# Patient Record
Sex: FEMALE | Race: BLACK OR AFRICAN AMERICAN | HISPANIC OR LATINO | URBAN - METROPOLITAN AREA
[De-identification: names, ages, dates, MRNs, and addresses within clinical notes are randomized per-mention and may not be internally consistent; named-entity substitution may affect disease eponyms.]

---

## 2017-03-08 ENCOUNTER — OUTPATIENT (OUTPATIENT)
Dept: OUTPATIENT SERVICES | Facility: HOSPITAL | Age: 57
LOS: 1 days | Discharge: HOME | End: 2017-03-08

## 2017-06-27 DIAGNOSIS — R79.89 OTHER SPECIFIED ABNORMAL FINDINGS OF BLOOD CHEMISTRY: ICD-10-CM

## 2017-06-27 DIAGNOSIS — D50.8 OTHER IRON DEFICIENCY ANEMIAS: ICD-10-CM

## 2017-06-27 DIAGNOSIS — M06.4 INFLAMMATORY POLYARTHROPATHY: ICD-10-CM

## 2017-07-05 ENCOUNTER — OUTPATIENT (OUTPATIENT)
Dept: OUTPATIENT SERVICES | Facility: HOSPITAL | Age: 57
LOS: 1 days | Discharge: HOME | End: 2017-07-05

## 2017-07-05 DIAGNOSIS — E11.9 TYPE 2 DIABETES MELLITUS WITHOUT COMPLICATIONS: ICD-10-CM

## 2017-07-05 DIAGNOSIS — M10.9 GOUT, UNSPECIFIED: ICD-10-CM

## 2017-07-05 DIAGNOSIS — R79.89 OTHER SPECIFIED ABNORMAL FINDINGS OF BLOOD CHEMISTRY: ICD-10-CM

## 2017-07-05 DIAGNOSIS — M06.9 RHEUMATOID ARTHRITIS, UNSPECIFIED: ICD-10-CM

## 2017-07-05 DIAGNOSIS — D50.8 OTHER IRON DEFICIENCY ANEMIAS: ICD-10-CM

## 2017-07-05 DIAGNOSIS — M06.4 INFLAMMATORY POLYARTHROPATHY: ICD-10-CM

## 2017-07-05 DIAGNOSIS — R80.9 PROTEINURIA, UNSPECIFIED: ICD-10-CM

## 2017-09-19 ENCOUNTER — OUTPATIENT (OUTPATIENT)
Dept: OUTPATIENT SERVICES | Facility: HOSPITAL | Age: 57
LOS: 1 days | Discharge: HOME | End: 2017-09-19

## 2017-09-19 DIAGNOSIS — R79.89 OTHER SPECIFIED ABNORMAL FINDINGS OF BLOOD CHEMISTRY: ICD-10-CM

## 2017-09-19 DIAGNOSIS — M06.9 RHEUMATOID ARTHRITIS, UNSPECIFIED: ICD-10-CM

## 2017-09-19 DIAGNOSIS — R76.12 NONSPECIFIC REACTION TO CELL MEDIATED IMMUNITY MEASUREMENT OF GAMMA INTERFERON ANTIGEN RESPONSE WITHOUT ACTIVE TUBERCULOSIS: ICD-10-CM

## 2017-09-19 DIAGNOSIS — D50.8 OTHER IRON DEFICIENCY ANEMIAS: ICD-10-CM

## 2017-09-19 DIAGNOSIS — E55.9 VITAMIN D DEFICIENCY, UNSPECIFIED: ICD-10-CM

## 2017-09-19 PROBLEM — Z00.00 ENCOUNTER FOR PREVENTIVE HEALTH EXAMINATION: Status: ACTIVE | Noted: 2017-09-19

## 2017-12-07 ENCOUNTER — OUTPATIENT (OUTPATIENT)
Dept: OUTPATIENT SERVICES | Facility: HOSPITAL | Age: 57
LOS: 1 days | Discharge: HOME | End: 2017-12-07

## 2017-12-07 DIAGNOSIS — N95.0 POSTMENOPAUSAL BLEEDING: ICD-10-CM

## 2017-12-11 ENCOUNTER — APPOINTMENT (OUTPATIENT)
Dept: OBGYN | Facility: CLINIC | Age: 57
End: 2017-12-11

## 2017-12-11 DIAGNOSIS — Z34.00 ENCOUNTER FOR SUPERVISION OF NORMAL FIRST PREGNANCY, UNSPECIFIED TRIMESTER: ICD-10-CM

## 2018-03-05 ENCOUNTER — OUTPATIENT (OUTPATIENT)
Dept: OUTPATIENT SERVICES | Facility: HOSPITAL | Age: 58
LOS: 1 days | Discharge: HOME | End: 2018-03-05

## 2018-03-05 DIAGNOSIS — R80.9 PROTEINURIA, UNSPECIFIED: ICD-10-CM

## 2018-03-05 DIAGNOSIS — M79.1 MYALGIA: ICD-10-CM

## 2018-03-05 DIAGNOSIS — D50.8 OTHER IRON DEFICIENCY ANEMIAS: ICD-10-CM

## 2018-03-05 DIAGNOSIS — R76.12 NONSPECIFIC REACTION TO CELL MEDIATED IMMUNITY MEASUREMENT OF GAMMA INTERFERON ANTIGEN RESPONSE WITHOUT ACTIVE TUBERCULOSIS: ICD-10-CM

## 2018-03-05 DIAGNOSIS — R79.89 OTHER SPECIFIED ABNORMAL FINDINGS OF BLOOD CHEMISTRY: ICD-10-CM

## 2018-04-11 ENCOUNTER — OUTPATIENT (OUTPATIENT)
Dept: OUTPATIENT SERVICES | Facility: HOSPITAL | Age: 58
LOS: 1 days | Discharge: HOME | End: 2018-04-11

## 2018-04-11 VITALS
RESPIRATION RATE: 20 BRPM | SYSTOLIC BLOOD PRESSURE: 162 MMHG | HEART RATE: 85 BPM | OXYGEN SATURATION: 100 % | WEIGHT: 273.37 LBS | TEMPERATURE: 97 F | DIASTOLIC BLOOD PRESSURE: 80 MMHG | HEIGHT: 66 IN

## 2018-04-11 DIAGNOSIS — Z98.890 OTHER SPECIFIED POSTPROCEDURAL STATES: Chronic | ICD-10-CM

## 2018-04-11 DIAGNOSIS — N95.0 POSTMENOPAUSAL BLEEDING: ICD-10-CM

## 2018-04-11 DIAGNOSIS — Z01.818 ENCOUNTER FOR OTHER PREPROCEDURAL EXAMINATION: ICD-10-CM

## 2018-04-11 LAB
ALBUMIN SERPL ELPH-MCNC: 3.8 G/DL — SIGNIFICANT CHANGE UP (ref 3.5–5.2)
ALP SERPL-CCNC: 101 U/L — SIGNIFICANT CHANGE UP (ref 30–115)
ALT FLD-CCNC: 17 U/L — SIGNIFICANT CHANGE UP (ref 0–41)
ANION GAP SERPL CALC-SCNC: 11 MMOL/L — SIGNIFICANT CHANGE UP (ref 7–14)
APPEARANCE UR: (no result)
APTT BLD: 34.6 SEC — SIGNIFICANT CHANGE UP (ref 27–39.2)
AST SERPL-CCNC: 20 U/L — SIGNIFICANT CHANGE UP (ref 0–41)
BACTERIA # UR AUTO: (no result) /HPF
BASOPHILS # BLD AUTO: 0.02 K/UL — SIGNIFICANT CHANGE UP (ref 0–0.2)
BASOPHILS NFR BLD AUTO: 0.3 % — SIGNIFICANT CHANGE UP (ref 0–1)
BILIRUB SERPL-MCNC: 0.2 MG/DL — SIGNIFICANT CHANGE UP (ref 0.2–1.2)
BILIRUB UR-MCNC: (no result)
BUN SERPL-MCNC: 13 MG/DL — SIGNIFICANT CHANGE UP (ref 10–20)
CALCIUM SERPL-MCNC: 9.4 MG/DL — SIGNIFICANT CHANGE UP (ref 8.5–10.1)
CHLORIDE SERPL-SCNC: 93 MMOL/L — LOW (ref 98–110)
CO2 SERPL-SCNC: 29 MMOL/L — SIGNIFICANT CHANGE UP (ref 17–32)
COLOR SPEC: SIGNIFICANT CHANGE UP
CREAT SERPL-MCNC: 1.1 MG/DL — SIGNIFICANT CHANGE UP (ref 0.7–1.5)
DIFF PNL FLD: NEGATIVE — SIGNIFICANT CHANGE UP
EOSINOPHIL # BLD AUTO: 0.05 K/UL — SIGNIFICANT CHANGE UP (ref 0–0.7)
EOSINOPHIL NFR BLD AUTO: 0.8 % — SIGNIFICANT CHANGE UP (ref 0–8)
EPI CELLS # UR: (no result) /HPF
GLUCOSE SERPL-MCNC: 69 MG/DL — LOW (ref 70–99)
GLUCOSE UR QL: NEGATIVE — SIGNIFICANT CHANGE UP
HCT VFR BLD CALC: 34.9 % — LOW (ref 37–47)
HGB BLD-MCNC: 11.7 G/DL — LOW (ref 12–16)
IMM GRANULOCYTES NFR BLD AUTO: 0.2 % — SIGNIFICANT CHANGE UP (ref 0.1–0.3)
INR BLD: 1 RATIO — SIGNIFICANT CHANGE UP (ref 0.65–1.3)
KETONES UR-MCNC: (no result)
LEUKOCYTE ESTERASE UR-ACNC: (no result)
LYMPHOCYTES # BLD AUTO: 2.21 K/UL — SIGNIFICANT CHANGE UP (ref 1.2–3.4)
LYMPHOCYTES # BLD AUTO: 33.5 % — SIGNIFICANT CHANGE UP (ref 20.5–51.1)
MCHC RBC-ENTMCNC: 31.1 PG — HIGH (ref 27–31)
MCHC RBC-ENTMCNC: 33.5 G/DL — SIGNIFICANT CHANGE UP (ref 32–37)
MCV RBC AUTO: 92.8 FL — SIGNIFICANT CHANGE UP (ref 81–99)
MONOCYTES # BLD AUTO: 0.73 K/UL — HIGH (ref 0.1–0.6)
MONOCYTES NFR BLD AUTO: 11.1 % — HIGH (ref 1.7–9.3)
NEUTROPHILS # BLD AUTO: 3.57 K/UL — SIGNIFICANT CHANGE UP (ref 1.4–6.5)
NEUTROPHILS NFR BLD AUTO: 54.1 % — SIGNIFICANT CHANGE UP (ref 42.2–75.2)
NITRITE UR-MCNC: NEGATIVE — SIGNIFICANT CHANGE UP
NRBC # BLD: 0 /100 WBCS — SIGNIFICANT CHANGE UP (ref 0–0)
PH UR: 6 — SIGNIFICANT CHANGE UP (ref 5–8)
PLATELET # BLD AUTO: 223 K/UL — SIGNIFICANT CHANGE UP (ref 130–400)
POTASSIUM SERPL-MCNC: 4.6 MMOL/L — SIGNIFICANT CHANGE UP (ref 3.5–5)
POTASSIUM SERPL-SCNC: 4.6 MMOL/L — SIGNIFICANT CHANGE UP (ref 3.5–5)
PROT SERPL-MCNC: 6.9 G/DL — SIGNIFICANT CHANGE UP (ref 6–8)
PROT UR-MCNC: 30
PROTHROM AB SERPL-ACNC: 10.8 SEC — SIGNIFICANT CHANGE UP (ref 9.95–12.87)
RBC # BLD: 3.76 M/UL — LOW (ref 4.2–5.4)
RBC # FLD: 13.6 % — SIGNIFICANT CHANGE UP (ref 11.5–14.5)
SODIUM SERPL-SCNC: 133 MMOL/L — LOW (ref 135–146)
SP GR SPEC: >=1.03 — SIGNIFICANT CHANGE UP (ref 1.01–1.03)
UROBILINOGEN FLD QL: 1 (ref 0.2–0.2)
WBC # BLD: 6.59 K/UL — SIGNIFICANT CHANGE UP (ref 4.8–10.8)
WBC # FLD AUTO: 6.59 K/UL — SIGNIFICANT CHANGE UP (ref 4.8–10.8)
WBC UR QL: (no result) /HPF

## 2018-04-11 NOTE — H&P PST ADULT - REASON FOR ADMISSION
Pt denies cp palp uri cough dysuria or sob. ET 1 FOS denies SOB . PT denies any open wounds, drainage or rashes.  scheduled for D&C Pt denies cp palp uri cough dysuria . ET 1 FOS with  SOB . PT denies any open wounds, drainage or rashes.  scheduled for D&C.   pt has hx of gastric bypass 2003.   initially lost 120 lbs but regained since per pt states .denies post op complications.  pt advised follow up dr tran for methotrexate advise.

## 2018-04-11 NOTE — H&P PST ADULT - NSANTHOSAYNRD_GEN_A_CORE
No. TING screening performed.  STOP BANG Legend: 0-2 = LOW Risk; 3-4 = INTERMEDIATE Risk; 5-8 = HIGH Risk/see sheet

## 2018-04-11 NOTE — H&P PST ADULT - PSH
History of cholecystectomy    History of surgery  gastric bypass 2003  S/P left knee arthroscopy  bilateral knee arthroscopy 2015

## 2018-04-11 NOTE — H&P PST ADULT - PMH
Fibromyalgia    OA (osteoarthritis)    Rheumatoid arthritis Anxiety    Depression    Fibromyalgia    OA (osteoarthritis)    Obesity  bmi 46  Rheumatoid arthritis

## 2018-04-12 LAB
CULTURE RESULTS: SIGNIFICANT CHANGE UP
SPECIMEN SOURCE: SIGNIFICANT CHANGE UP

## 2018-04-18 ENCOUNTER — OUTPATIENT (OUTPATIENT)
Dept: OUTPATIENT SERVICES | Facility: HOSPITAL | Age: 58
LOS: 1 days | Discharge: HOME | End: 2018-04-18

## 2018-04-18 DIAGNOSIS — Z98.890 OTHER SPECIFIED POSTPROCEDURAL STATES: Chronic | ICD-10-CM

## 2018-04-18 DIAGNOSIS — Z01.818 ENCOUNTER FOR OTHER PREPROCEDURAL EXAMINATION: ICD-10-CM

## 2018-04-18 LAB
ANION GAP SERPL CALC-SCNC: 10 MMOL/L — SIGNIFICANT CHANGE UP (ref 7–14)
BUN SERPL-MCNC: 27 MG/DL — HIGH (ref 10–20)
CALCIUM SERPL-MCNC: 8.8 MG/DL — SIGNIFICANT CHANGE UP (ref 8.5–10.1)
CHLORIDE SERPL-SCNC: 102 MMOL/L — SIGNIFICANT CHANGE UP (ref 98–110)
CO2 SERPL-SCNC: 26 MMOL/L — SIGNIFICANT CHANGE UP (ref 17–32)
CREAT SERPL-MCNC: 1 MG/DL — SIGNIFICANT CHANGE UP (ref 0.7–1.5)
GLUCOSE SERPL-MCNC: 89 MG/DL — SIGNIFICANT CHANGE UP (ref 70–99)
POTASSIUM SERPL-MCNC: 5 MMOL/L — SIGNIFICANT CHANGE UP (ref 3.5–5)
POTASSIUM SERPL-SCNC: 5 MMOL/L — SIGNIFICANT CHANGE UP (ref 3.5–5)
SODIUM SERPL-SCNC: 138 MMOL/L — SIGNIFICANT CHANGE UP (ref 135–146)

## 2018-04-19 DIAGNOSIS — N95.0 POSTMENOPAUSAL BLEEDING: ICD-10-CM

## 2018-04-19 LAB
CULTURE RESULTS: SIGNIFICANT CHANGE UP
SPECIMEN SOURCE: SIGNIFICANT CHANGE UP

## 2018-04-24 NOTE — ASU PATIENT PROFILE, ADULT - PMH
Anxiety    Depression    Fibromyalgia    OA (osteoarthritis)    Obesity  bmi 46  Rheumatoid arthritis

## 2018-04-25 ENCOUNTER — RESULT REVIEW (OUTPATIENT)
Age: 58
End: 2018-04-25

## 2018-04-25 ENCOUNTER — OUTPATIENT (OUTPATIENT)
Dept: OUTPATIENT SERVICES | Facility: HOSPITAL | Age: 58
LOS: 1 days | Discharge: HOME | End: 2018-04-25

## 2018-04-25 VITALS
OXYGEN SATURATION: 100 % | TEMPERATURE: 98 F | WEIGHT: 273.37 LBS | SYSTOLIC BLOOD PRESSURE: 126 MMHG | RESPIRATION RATE: 18 BRPM | HEART RATE: 73 BPM | HEIGHT: 66 IN | DIASTOLIC BLOOD PRESSURE: 76 MMHG

## 2018-04-25 VITALS
DIASTOLIC BLOOD PRESSURE: 81 MMHG | SYSTOLIC BLOOD PRESSURE: 140 MMHG | RESPIRATION RATE: 18 BRPM | HEART RATE: 75 BPM | OXYGEN SATURATION: 100 %

## 2018-04-25 DIAGNOSIS — Z98.890 OTHER SPECIFIED POSTPROCEDURAL STATES: Chronic | ICD-10-CM

## 2018-04-25 RX ORDER — MORPHINE SULFATE 50 MG/1
2 CAPSULE, EXTENDED RELEASE ORAL
Qty: 0 | Refills: 0 | Status: DISCONTINUED | OUTPATIENT
Start: 2018-04-25 | End: 2018-04-25

## 2018-04-25 RX ORDER — ONDANSETRON 8 MG/1
4 TABLET, FILM COATED ORAL ONCE
Qty: 0 | Refills: 0 | Status: DISCONTINUED | OUTPATIENT
Start: 2018-04-25 | End: 2018-05-10

## 2018-04-25 RX ORDER — SODIUM CHLORIDE 9 MG/ML
1000 INJECTION, SOLUTION INTRAVENOUS
Qty: 0 | Refills: 0 | Status: DISCONTINUED | OUTPATIENT
Start: 2018-04-25 | End: 2018-05-10

## 2018-04-25 RX ORDER — KETOROLAC TROMETHAMINE 30 MG/ML
15 SYRINGE (ML) INJECTION ONCE
Qty: 0 | Refills: 0 | Status: DISCONTINUED | OUTPATIENT
Start: 2018-04-25 | End: 2018-04-25

## 2018-04-25 RX ADMIN — SODIUM CHLORIDE 75 MILLILITER(S): 9 INJECTION, SOLUTION INTRAVENOUS at 15:21

## 2018-04-25 NOTE — ASU DISCHARGE PLAN (ADULT/PEDIATRIC). - ASU FOLLOWUP
911 or go to the nearest Emergency Room [facility] ER:  [place] for Ambulatory Surgery...

## 2018-04-25 NOTE — PRE-ANESTHESIA EVALUATION ADULT - NSANTHOSAYNRD_GEN_A_CORE
see sheet/No. TING screening performed.  STOP BANG Legend: 0-2 = LOW Risk; 3-4 = INTERMEDIATE Risk; 5-8 = HIGH Risk

## 2018-04-25 NOTE — BRIEF OPERATIVE NOTE - OPERATION/FINDINGS
Obese patient, uterine fundus not palpable, normal vagina, stenotic cervix, polypoid appearing tissue in cervix

## 2018-04-25 NOTE — BRIEF OPERATIVE NOTE - PROCEDURE
<<-----Click on this checkbox to enter Procedure Hysteroscopy with dilation and curettage of uterus using MyoSure device  04/25/2018    Active  APULLANO

## 2018-04-25 NOTE — BRIEF OPERATIVE NOTE - COMMENTS
Distension medium: normal saline  Fluid deficit: 1000cc, approximately 700cc on floor and bed  Estimated actual fluid deficit: 300cc

## 2018-04-25 NOTE — BRIEF OPERATIVE NOTE - POST-OP DX
Cervical stenosis (uterine cervix)  04/25/2018    Active  Tu Zamudio  Postmenopausal bleeding  04/25/2018    Active  Tu Zamudio

## 2018-04-30 LAB — SURGICAL PATHOLOGY STUDY: SIGNIFICANT CHANGE UP

## 2018-05-02 DIAGNOSIS — N84.1 POLYP OF CERVIX UTERI: ICD-10-CM

## 2018-05-02 DIAGNOSIS — Z98.84 BARIATRIC SURGERY STATUS: ICD-10-CM

## 2018-05-02 DIAGNOSIS — F32.9 MAJOR DEPRESSIVE DISORDER, SINGLE EPISODE, UNSPECIFIED: ICD-10-CM

## 2018-05-02 DIAGNOSIS — N95.0 POSTMENOPAUSAL BLEEDING: ICD-10-CM

## 2018-05-02 DIAGNOSIS — N88.2 STRICTURE AND STENOSIS OF CERVIX UTERI: ICD-10-CM

## 2018-05-02 DIAGNOSIS — Z90.49 ACQUIRED ABSENCE OF OTHER SPECIFIED PARTS OF DIGESTIVE TRACT: ICD-10-CM

## 2018-05-02 DIAGNOSIS — M06.9 RHEUMATOID ARTHRITIS, UNSPECIFIED: ICD-10-CM

## 2018-05-02 DIAGNOSIS — F41.9 ANXIETY DISORDER, UNSPECIFIED: ICD-10-CM

## 2018-05-02 DIAGNOSIS — M19.90 UNSPECIFIED OSTEOARTHRITIS, UNSPECIFIED SITE: ICD-10-CM

## 2018-05-02 DIAGNOSIS — E66.9 OBESITY, UNSPECIFIED: ICD-10-CM

## 2018-05-02 DIAGNOSIS — M79.7 FIBROMYALGIA: ICD-10-CM

## 2018-06-01 ENCOUNTER — OUTPATIENT (OUTPATIENT)
Dept: OUTPATIENT SERVICES | Facility: HOSPITAL | Age: 58
LOS: 1 days | Discharge: HOME | End: 2018-06-01

## 2018-06-01 DIAGNOSIS — E78.2 MIXED HYPERLIPIDEMIA: ICD-10-CM

## 2018-06-01 DIAGNOSIS — Z98.890 OTHER SPECIFIED POSTPROCEDURAL STATES: Chronic | ICD-10-CM

## 2018-06-01 DIAGNOSIS — M06.4 INFLAMMATORY POLYARTHROPATHY: ICD-10-CM

## 2018-06-01 DIAGNOSIS — E78.00 PURE HYPERCHOLESTEROLEMIA, UNSPECIFIED: ICD-10-CM

## 2018-06-01 DIAGNOSIS — D50.9 IRON DEFICIENCY ANEMIA, UNSPECIFIED: ICD-10-CM

## 2018-06-04 ENCOUNTER — OUTPATIENT (OUTPATIENT)
Dept: OUTPATIENT SERVICES | Facility: HOSPITAL | Age: 58
LOS: 1 days | Discharge: HOME | End: 2018-06-04

## 2018-06-04 VITALS
TEMPERATURE: 98 F | WEIGHT: 250 LBS | RESPIRATION RATE: 16 BRPM | HEART RATE: 76 BPM | DIASTOLIC BLOOD PRESSURE: 86 MMHG | SYSTOLIC BLOOD PRESSURE: 130 MMHG | OXYGEN SATURATION: 98 %

## 2018-06-04 DIAGNOSIS — K63.5 POLYP OF COLON: Chronic | ICD-10-CM

## 2018-06-04 DIAGNOSIS — Z01.818 ENCOUNTER FOR OTHER PREPROCEDURAL EXAMINATION: ICD-10-CM

## 2018-06-04 DIAGNOSIS — Z98.890 OTHER SPECIFIED POSTPROCEDURAL STATES: Chronic | ICD-10-CM

## 2018-06-04 DIAGNOSIS — M76.62 ACHILLES TENDINITIS, LEFT LEG: ICD-10-CM

## 2018-06-04 RX ORDER — CERTOLIZUMAB PEGOL 400 MG
0 KIT SUBCUTANEOUS
Qty: 0 | Refills: 0 | COMMUNITY

## 2018-06-04 RX ORDER — INDOMETHACIN 50 MG
1 CAPSULE ORAL
Qty: 0 | Refills: 0 | COMMUNITY

## 2018-06-04 RX ORDER — METHOTREXATE 2.5 MG/1
1 TABLET ORAL
Qty: 0 | Refills: 0 | COMMUNITY

## 2018-06-04 RX ORDER — DULOXETINE HYDROCHLORIDE 30 MG/1
0 CAPSULE, DELAYED RELEASE ORAL
Qty: 0 | Refills: 0 | COMMUNITY

## 2018-06-04 NOTE — H&P PST ADULT - HISTORY OF PRESENT ILLNESS
57YR OLF FEMALE STATES WAS WALKING THE DOG ABOUT FEW WEEKS AGO AND "HURT MY MY LEFT ACHILLES TENDON". Denies c/o cp ,palp, sob, uri ,fever ,rash ot uti symptoms. Exercise jayna -uses cane, walks slowly 1-2 flat blocks ltd by pain lt ankle.

## 2018-06-04 NOTE — H&P PST ADULT - PSH
Colon polyp  colonoscopy q 6m  History of cholecystectomy    History of dilatation and curettage  April 2018  History of surgery  gastric bypass 2003  S/P left knee arthroscopy  bilateral knee arthroscopy 2015

## 2018-06-07 ENCOUNTER — OUTPATIENT (OUTPATIENT)
Dept: OUTPATIENT SERVICES | Facility: HOSPITAL | Age: 58
LOS: 1 days | Discharge: HOME | End: 2018-06-07
Payer: MEDICARE

## 2018-06-07 VITALS
DIASTOLIC BLOOD PRESSURE: 78 MMHG | TEMPERATURE: 98 F | HEART RATE: 76 BPM | HEIGHT: 66 IN | OXYGEN SATURATION: 100 % | WEIGHT: 273.37 LBS | SYSTOLIC BLOOD PRESSURE: 136 MMHG | RESPIRATION RATE: 18 BRPM

## 2018-06-07 VITALS
SYSTOLIC BLOOD PRESSURE: 131 MMHG | RESPIRATION RATE: 22 BRPM | OXYGEN SATURATION: 99 % | DIASTOLIC BLOOD PRESSURE: 79 MMHG | HEART RATE: 77 BPM

## 2018-06-07 DIAGNOSIS — Z98.890 OTHER SPECIFIED POSTPROCEDURAL STATES: Chronic | ICD-10-CM

## 2018-06-07 DIAGNOSIS — K63.5 POLYP OF COLON: Chronic | ICD-10-CM

## 2018-06-07 PROCEDURE — 99213 OFFICE O/P EST LOW 20 MIN: CPT

## 2018-06-07 RX ORDER — ONDANSETRON 8 MG/1
4 TABLET, FILM COATED ORAL ONCE
Qty: 0 | Refills: 0 | Status: COMPLETED | OUTPATIENT
Start: 2018-06-07 | End: 2018-06-07

## 2018-06-07 RX ORDER — IBUPROFEN 200 MG
1 TABLET ORAL
Qty: 90 | Refills: 0 | OUTPATIENT
Start: 2018-06-07 | End: 2018-07-06

## 2018-06-07 RX ORDER — SODIUM CHLORIDE 9 MG/ML
1000 INJECTION, SOLUTION INTRAVENOUS
Qty: 0 | Refills: 0 | Status: DISCONTINUED | OUTPATIENT
Start: 2018-06-07 | End: 2018-06-22

## 2018-06-07 RX ORDER — MORPHINE SULFATE 50 MG/1
2 CAPSULE, EXTENDED RELEASE ORAL
Qty: 0 | Refills: 0 | Status: DISCONTINUED | OUTPATIENT
Start: 2018-06-07 | End: 2018-06-07

## 2018-06-07 RX ORDER — ASPIRIN/CALCIUM CARB/MAGNESIUM 324 MG
1 TABLET ORAL
Qty: 60 | Refills: 0 | OUTPATIENT
Start: 2018-06-07 | End: 2018-07-06

## 2018-06-07 RX ORDER — OXYCODONE AND ACETAMINOPHEN 5; 325 MG/1; MG/1
2 TABLET ORAL EVERY 6 HOURS
Qty: 0 | Refills: 0 | Status: DISCONTINUED | OUTPATIENT
Start: 2018-06-07 | End: 2018-06-07

## 2018-06-07 RX ORDER — MEPERIDINE HYDROCHLORIDE 50 MG/ML
12.5 INJECTION INTRAMUSCULAR; INTRAVENOUS; SUBCUTANEOUS ONCE
Qty: 0 | Refills: 0 | Status: DISCONTINUED | OUTPATIENT
Start: 2018-06-07 | End: 2018-06-07

## 2018-06-07 RX ORDER — MORPHINE SULFATE 50 MG/1
4 CAPSULE, EXTENDED RELEASE ORAL
Qty: 0 | Refills: 0 | Status: DISCONTINUED | OUTPATIENT
Start: 2018-06-07 | End: 2018-06-07

## 2018-06-07 RX ADMIN — MEPERIDINE HYDROCHLORIDE 12.5 MILLIGRAM(S): 50 INJECTION INTRAMUSCULAR; INTRAVENOUS; SUBCUTANEOUS at 16:08

## 2018-06-07 RX ADMIN — ONDANSETRON 4 MILLIGRAM(S): 8 TABLET, FILM COATED ORAL at 16:54

## 2018-06-07 RX ADMIN — OXYCODONE AND ACETAMINOPHEN 2 TABLET(S): 5; 325 TABLET ORAL at 18:48

## 2018-06-07 RX ADMIN — MORPHINE SULFATE 2 MILLIGRAM(S): 50 CAPSULE, EXTENDED RELEASE ORAL at 18:05

## 2018-06-07 RX ADMIN — MORPHINE SULFATE 2 MILLIGRAM(S): 50 CAPSULE, EXTENDED RELEASE ORAL at 16:53

## 2018-06-07 RX ADMIN — MEPERIDINE HYDROCHLORIDE 12.5 MILLIGRAM(S): 50 INJECTION INTRAMUSCULAR; INTRAVENOUS; SUBCUTANEOUS at 16:54

## 2018-06-07 RX ADMIN — MORPHINE SULFATE 4 MILLIGRAM(S): 50 CAPSULE, EXTENDED RELEASE ORAL at 16:54

## 2018-06-07 RX ADMIN — MORPHINE SULFATE 4 MILLIGRAM(S): 50 CAPSULE, EXTENDED RELEASE ORAL at 17:30

## 2018-06-07 RX ADMIN — SODIUM CHLORIDE 100 MILLILITER(S): 9 INJECTION, SOLUTION INTRAVENOUS at 15:55

## 2018-06-07 RX ADMIN — MORPHINE SULFATE 4 MILLIGRAM(S): 50 CAPSULE, EXTENDED RELEASE ORAL at 15:50

## 2018-06-07 RX ADMIN — MORPHINE SULFATE 4 MILLIGRAM(S): 50 CAPSULE, EXTENDED RELEASE ORAL at 18:05

## 2018-06-07 NOTE — PRE-ANESTHESIA EVALUATION ADULT - NSANTHADDINFOFT_GEN_ALL_CORE
discussed Cspine films with neurosurgery Dr. Charmaine Montano feels that patient is stable for procedure including intubation and prone positioning. Discussed Cspine films with neurosurgery Dr. Chris Perez feels that patient is stable for procedure including intubation and prone positioning.

## 2018-06-07 NOTE — CHART NOTE - NSCHARTNOTEFT_GEN_A_CORE
PACU ANESTHESIA ADMISSION NOTE      Procedure: Repair of tear of left Achilles tendon    Post op diagnosis:  Achilles tendon rupture, left, subsequent encounter      ____  Intubated  TV:______       Rate: ______      FiO2: ______    _x___  Patent Airway    _x___  Full return of protective reflexes    _x___  Full recovery from anesthesia / back to baseline status    Vitals:  T(C): 36.6 (  HR: 76 (  BP: 105/56  RR: 18   SpO2: 100%     Mental Status:  _x___ Awake   _____ Alert   _____ Drowsy   _____ Sedated    Nausea/Vomiting:  _x___  NO       ______Yes,   See Post - Op Orders         Pain Scale (0-10):  __0___    Treatment: _x___ None    ____ See Post - Op/PCA Orders    Post - Operative Fluids:   __x__ Oral   ____ See Post - Op Orders    Plan: Discharge:   _x ___Home       _____Floor     _____Critical Care    _____  Other:_________________    Comments:  No anesthesia issues or complications noted.  Discharge when criteria met.

## 2018-06-07 NOTE — ASU DISCHARGE PLAN (ADULT/PEDIATRIC). - NOTIFY
Pain not relieved by Medications/Fever greater than 101/Numbness, color, or temperature change to extremity/Bleeding that does not stop/Swelling that continues/Numbness, tingling

## 2018-06-07 NOTE — ASU DISCHARGE PLAN (ADULT/PEDIATRIC). - SPECIAL INSTRUCTIONS
nonweightbearing left lower extremity, elevate, ice, pain control, aspirin 81 mg twice a day for DVT prophylaxis, follow up in 2 weeks suture removal and xrays.

## 2018-06-07 NOTE — ASU PATIENT PROFILE, ADULT - TEACHING/LEARNING RELIGIOUS CONSIDERATIONS
Abstain for now,hot water,kegels,keep appt,enjoy baby!    Postpartum Vaginal Delivery Instructions    Activity    Ask family and friends for help when you need it.  Do not place anything in your vagina until approved by your Physician .  You are not restricted on other activities, but take it easy for a few weeks to allow your body to recover from delivery.  You are able to do any activities you feel up to that point.  No driving until you have stopped taking narcotic pain medications.    Call your health care provider if you have any of these symptoms:    Increased pain, swelling, redness, or fluid around your stiches from an episiotomy or perineal tear.  A fever above 100.4 F (38 C) with or without chills when placing a thermometer under your tongue.  You soak a sanitary pad with blood within 1 hour, or you see blood clots larger than a golf ball.  Bleeding that lasts more than 6 weeks.  Vaginal discharge that smells bad.  Severe pain, cramping or tenderness in your lower belly area.  A need to urinate more frequently (use the toilet more often), more urgently (use the toilet very quickly), or it burns when you urinate.  Nausea and vomiting.  Redness, swelling or pain around a vein in your leg.  Problems breastfeeding or a red or painful area on your breast.  Chest pain and cough or are gasping for air.  Problems coping with sadness, anxiety, or depression.  If you have any concerns about hurting yourself or the baby, call your provider immediately. The Safe Place for Newborns law allows you to bring your baby to the hospital up to 7 days, no questions asked.  You have questions or concerns after you return home.    Keep your hands clean:  Always wash your hands before touching your perineal area and stitches.  This helps reduce your risk of infection.  If your hands aren't dirty, you may use an alcohol hand-rub to clean your hands. Keep your nails clean and short.    
none

## 2018-06-07 NOTE — BRIEF OPERATIVE NOTE - PROCEDURE
<<-----Click on this checkbox to enter Procedure Repair of tear of left Achilles tendon  06/07/2018    Active  RITESH

## 2018-06-07 NOTE — CONSULT NOTE ADULT - SUBJECTIVE AND OBJECTIVE BOX
57 year old female here for achilles tendon repair. Pt has history of RA. Cervical x-ray shows multi-level sponydlotic disease. No fracture. Min 3 mm anterolisthesis in flexion C4 over 5 reduces in extension. Pt has no complaints of neck pain, no pain, parasthesias or weakness in limbs. No pain in extremities on ROM of neck. Anaesthesia would like neuropsurgery clearance for prone positioning.      Vital Signs Last 24 Hrs  T(C): 36.6 (07 Jun 2018 13:12), Max: 36.6 (07 Jun 2018 12:01)  T(F): 97.8 (07 Jun 2018 12:01), Max: 97.8 (07 Jun 2018 12:01)  HR: 76 (07 Jun 2018 13:12) (76 - 76)  BP: 136/78 (07 Jun 2018 13:12) (136/78 - 136/78)  BP(mean): --  RR: 18 (07 Jun 2018 13:12) (18 - 18)  SpO2: 100% (07 Jun 2018 13:12) (100% - 100%)        PHYSICAL EXAM:  Neurological: A&Ox3, CN II-XII grossly intact    Motor exam:         [x] Upper extremity              Bi(c5)  WE(c6)  EE(c7)   FF(c8)                                                R         5/5        5/5        5/5       5/5                                               L          5/5        5/5        5/5       5/5         [x] Lower extremeity          HF(l2)   KE(l3)    TA(l4)   EHL(l5)  GS(s1)                                                 R        5/5        5/5        5/5       5/5         5/5                                               L         5/5        5/5       5/5       5/5          5/5                                                     Sensation: [x] intact to light touch  [] decreased:       Pt with full ROM C-spine without complaints. no pain to palpation in cervical spine    PMH/PSH:  Anxiety  Obesity  Depression  Fibromyalgia  OA (osteoarthritis)  Rheumatoid arthritis  Colon polyp  History of dilatation and curettage  S/P left knee arthroscopy  History of cholecystectomy  History of surgery        Allergies    No Known Allergies        ASSESSMENT:  57y Female with incidental finding of 3mm anterolisthesis of C4-5 in flexion which reduces in extesion. She has no prior symptoms related to the cervical spine. Neurologic exam is within normal limits. Pt ok for prone postioning for achilles surgery.

## 2018-06-11 DIAGNOSIS — M66.862 SPONTANEOUS RUPTURE OF OTHER TENDONS, LEFT LOWER LEG: ICD-10-CM

## 2018-06-11 DIAGNOSIS — E66.9 OBESITY, UNSPECIFIED: ICD-10-CM

## 2018-10-04 ENCOUNTER — OUTPATIENT (OUTPATIENT)
Dept: OUTPATIENT SERVICES | Facility: HOSPITAL | Age: 58
LOS: 1 days | Discharge: HOME | End: 2018-10-04

## 2018-10-04 DIAGNOSIS — Z98.890 OTHER SPECIFIED POSTPROCEDURAL STATES: Chronic | ICD-10-CM

## 2018-10-04 DIAGNOSIS — D25.9 LEIOMYOMA OF UTERUS, UNSPECIFIED: ICD-10-CM

## 2018-10-04 DIAGNOSIS — K63.5 POLYP OF COLON: Chronic | ICD-10-CM

## 2018-10-04 PROBLEM — M79.7 FIBROMYALGIA: Chronic | Status: ACTIVE | Noted: 2018-04-11

## 2018-10-04 PROBLEM — F41.9 ANXIETY DISORDER, UNSPECIFIED: Chronic | Status: ACTIVE | Noted: 2018-04-11

## 2018-10-04 PROBLEM — M19.90 UNSPECIFIED OSTEOARTHRITIS, UNSPECIFIED SITE: Chronic | Status: ACTIVE | Noted: 2018-04-11

## 2018-10-04 PROBLEM — M06.9 RHEUMATOID ARTHRITIS, UNSPECIFIED: Chronic | Status: ACTIVE | Noted: 2018-04-11

## 2018-10-25 ENCOUNTER — OUTPATIENT (OUTPATIENT)
Dept: OUTPATIENT SERVICES | Facility: HOSPITAL | Age: 58
LOS: 1 days | Discharge: HOME | End: 2018-10-25

## 2018-10-25 DIAGNOSIS — Z98.890 OTHER SPECIFIED POSTPROCEDURAL STATES: Chronic | ICD-10-CM

## 2018-10-25 DIAGNOSIS — K63.5 POLYP OF COLON: Chronic | ICD-10-CM

## 2018-10-25 DIAGNOSIS — M06.4 INFLAMMATORY POLYARTHROPATHY: ICD-10-CM

## 2018-10-25 DIAGNOSIS — D50.8 OTHER IRON DEFICIENCY ANEMIAS: ICD-10-CM

## 2018-10-25 DIAGNOSIS — E78.00 PURE HYPERCHOLESTEROLEMIA, UNSPECIFIED: ICD-10-CM

## 2018-10-25 DIAGNOSIS — E78.2 MIXED HYPERLIPIDEMIA: ICD-10-CM

## 2018-11-19 ENCOUNTER — OUTPATIENT (OUTPATIENT)
Dept: OUTPATIENT SERVICES | Facility: HOSPITAL | Age: 58
LOS: 1 days | Discharge: HOME | End: 2018-11-19

## 2018-11-19 VITALS
DIASTOLIC BLOOD PRESSURE: 82 MMHG | HEART RATE: 79 BPM | HEIGHT: 66 IN | WEIGHT: 240.3 LBS | SYSTOLIC BLOOD PRESSURE: 150 MMHG | TEMPERATURE: 97 F | RESPIRATION RATE: 20 BRPM | OXYGEN SATURATION: 100 %

## 2018-11-19 DIAGNOSIS — Z01.818 ENCOUNTER FOR OTHER PREPROCEDURAL EXAMINATION: ICD-10-CM

## 2018-11-19 DIAGNOSIS — D25.9 LEIOMYOMA OF UTERUS, UNSPECIFIED: ICD-10-CM

## 2018-11-19 DIAGNOSIS — Z98.890 OTHER SPECIFIED POSTPROCEDURAL STATES: Chronic | ICD-10-CM

## 2018-11-19 DIAGNOSIS — K63.5 POLYP OF COLON: Chronic | ICD-10-CM

## 2018-11-19 LAB
APPEARANCE UR: ABNORMAL
BACTERIA # UR AUTO: ABNORMAL /HPF
BASOPHILS # BLD AUTO: 0.02 K/UL — SIGNIFICANT CHANGE UP (ref 0–0.2)
BASOPHILS NFR BLD AUTO: 0.4 % — SIGNIFICANT CHANGE UP (ref 0–1)
BILIRUB UR-MCNC: ABNORMAL
BLD GP AB SCN SERPL QL: SIGNIFICANT CHANGE UP
COLOR SPEC: SIGNIFICANT CHANGE UP
COMMENT - URINE: SIGNIFICANT CHANGE UP
DIFF PNL FLD: NEGATIVE — SIGNIFICANT CHANGE UP
EOSINOPHIL # BLD AUTO: 0.09 K/UL — SIGNIFICANT CHANGE UP (ref 0–0.7)
EOSINOPHIL NFR BLD AUTO: 1.8 % — SIGNIFICANT CHANGE UP (ref 0–8)
GLUCOSE UR QL: NEGATIVE — SIGNIFICANT CHANGE UP
HCT VFR BLD CALC: 34.7 % — LOW (ref 37–47)
HGB BLD-MCNC: 11.4 G/DL — LOW (ref 12–16)
IMM GRANULOCYTES NFR BLD AUTO: 0.2 % — SIGNIFICANT CHANGE UP (ref 0.1–0.3)
KETONES UR-MCNC: ABNORMAL
LEUKOCYTE ESTERASE UR-ACNC: ABNORMAL
LYMPHOCYTES # BLD AUTO: 1.82 K/UL — SIGNIFICANT CHANGE UP (ref 1.2–3.4)
LYMPHOCYTES # BLD AUTO: 36 % — SIGNIFICANT CHANGE UP (ref 20.5–51.1)
MCHC RBC-ENTMCNC: 30.3 PG — SIGNIFICANT CHANGE UP (ref 27–31)
MCHC RBC-ENTMCNC: 32.9 G/DL — SIGNIFICANT CHANGE UP (ref 32–37)
MCV RBC AUTO: 92.3 FL — SIGNIFICANT CHANGE UP (ref 81–99)
MONOCYTES # BLD AUTO: 0.44 K/UL — SIGNIFICANT CHANGE UP (ref 0.1–0.6)
MONOCYTES NFR BLD AUTO: 8.7 % — SIGNIFICANT CHANGE UP (ref 1.7–9.3)
NEUTROPHILS # BLD AUTO: 2.68 K/UL — SIGNIFICANT CHANGE UP (ref 1.4–6.5)
NEUTROPHILS NFR BLD AUTO: 52.9 % — SIGNIFICANT CHANGE UP (ref 42.2–75.2)
NITRITE UR-MCNC: POSITIVE
NRBC # BLD: 0 /100 WBCS — SIGNIFICANT CHANGE UP (ref 0–0)
PH UR: 6 — SIGNIFICANT CHANGE UP (ref 5–8)
PLATELET # BLD AUTO: 299 K/UL — SIGNIFICANT CHANGE UP (ref 130–400)
PROT UR-MCNC: ABNORMAL
RBC # BLD: 3.76 M/UL — LOW (ref 4.2–5.4)
RBC # FLD: 13.7 % — SIGNIFICANT CHANGE UP (ref 11.5–14.5)
SP GR SPEC: >=1.03 — SIGNIFICANT CHANGE UP (ref 1.01–1.03)
TYPE + AB SCN PNL BLD: SIGNIFICANT CHANGE UP
UROBILINOGEN FLD QL: 1 (ref 0.2–0.2)
WBC # BLD: 5.06 K/UL — SIGNIFICANT CHANGE UP (ref 4.8–10.8)
WBC # FLD AUTO: 5.06 K/UL — SIGNIFICANT CHANGE UP (ref 4.8–10.8)
WBC UR QL: SIGNIFICANT CHANGE UP /HPF

## 2018-11-19 RX ORDER — INDOMETHACIN 50 MG
75 CAPSULE ORAL
Qty: 0 | Refills: 0 | COMMUNITY

## 2018-11-19 NOTE — H&P PST ADULT - REASON FOR ADMISSION
pt admitted to [facility] for bleeding ulcer 10/25-10-27  during routine ct of abdomen fibroids in uterus  pt denies current cp,sob,palp,cough,dysuria   can walk 2 fos and several blocks without sob  limited by RA,OA and fibromyalgis pt states this is complete med/surg history

## 2018-11-19 NOTE — H&P PST ADULT - PMH
Anxiety    Depression  no suicide ideation  Fibromyalgia    OA (osteoarthritis)    Obesity  bmi 46  PTSD (post-traumatic stress disorder)    Rheumatoid arthritis    Sciatica  with numbness/tingling and dizziness Anxiety    Depression  no suicide ideation  Fibromyalgia    OA (osteoarthritis)    Obesity    PTSD (post-traumatic stress disorder)    Rheumatoid arthritis    Sciatica  with numbness/tingling and dizziness

## 2018-11-19 NOTE — H&P PST ADULT - PSH
Colon polyp  colonoscopy q 6m  History of cholecystectomy    History of dilatation and curettage  April 2018  History of surgery  ruptured achilles tendon repair  History of surgery  gastric bypass 2003  S/P left knee arthroscopy  bilateral knee arthroscopy 2015 Colon polyp  colonoscopy q 6m  History of cholecystectomy    History of dilatation and curettage  April 2018  History of surgery  repaired left achilles tendon 6/2018  History of surgery  gastric bypass 2003  S/P left knee arthroscopy  bilateral knee arthroscopy 2015

## 2018-11-20 PROBLEM — F32.9 MAJOR DEPRESSIVE DISORDER, SINGLE EPISODE, UNSPECIFIED: Chronic | Status: ACTIVE | Noted: 2018-04-11

## 2020-10-23 PROBLEM — F43.10 POST-TRAUMATIC STRESS DISORDER, UNSPECIFIED: Chronic | Status: ACTIVE | Noted: 2018-11-19

## 2020-10-27 ENCOUNTER — RESULT REVIEW (OUTPATIENT)
Age: 60
End: 2020-10-27

## 2020-10-27 ENCOUNTER — OUTPATIENT (OUTPATIENT)
Dept: OUTPATIENT SERVICES | Facility: HOSPITAL | Age: 60
LOS: 1 days | Discharge: HOME | End: 2020-10-27
Payer: MEDICARE

## 2020-10-27 VITALS
OXYGEN SATURATION: 100 % | SYSTOLIC BLOOD PRESSURE: 132 MMHG | WEIGHT: 171.3 LBS | HEIGHT: 66 IN | TEMPERATURE: 99 F | DIASTOLIC BLOOD PRESSURE: 66 MMHG | HEART RATE: 78 BPM | RESPIRATION RATE: 16 BRPM

## 2020-10-27 DIAGNOSIS — Z98.890 OTHER SPECIFIED POSTPROCEDURAL STATES: Chronic | ICD-10-CM

## 2020-10-27 DIAGNOSIS — Z01.818 ENCOUNTER FOR OTHER PREPROCEDURAL EXAMINATION: ICD-10-CM

## 2020-10-27 DIAGNOSIS — M17.12 UNILATERAL PRIMARY OSTEOARTHRITIS, LEFT KNEE: ICD-10-CM

## 2020-10-27 DIAGNOSIS — K63.5 POLYP OF COLON: Chronic | ICD-10-CM

## 2020-10-27 LAB
A1C WITH ESTIMATED AVERAGE GLUCOSE RESULT: 5.7 % — HIGH (ref 4–5.6)
ALBUMIN SERPL ELPH-MCNC: 3.9 G/DL — SIGNIFICANT CHANGE UP (ref 3.5–5.2)
ALP SERPL-CCNC: 87 U/L — SIGNIFICANT CHANGE UP (ref 30–115)
ALT FLD-CCNC: 8 U/L — SIGNIFICANT CHANGE UP (ref 0–41)
ANION GAP SERPL CALC-SCNC: 8 MMOL/L — SIGNIFICANT CHANGE UP (ref 7–14)
ANISOCYTOSIS BLD QL: SLIGHT — SIGNIFICANT CHANGE UP
APTT BLD: 35.7 SEC — SIGNIFICANT CHANGE UP (ref 27–39.2)
AST SERPL-CCNC: 20 U/L — SIGNIFICANT CHANGE UP (ref 0–41)
BASOPHILS # BLD AUTO: 0.09 K/UL — SIGNIFICANT CHANGE UP (ref 0–0.2)
BASOPHILS NFR BLD AUTO: 3.5 % — HIGH (ref 0–1)
BILIRUB SERPL-MCNC: 0.2 MG/DL — SIGNIFICANT CHANGE UP (ref 0.2–1.2)
BLD GP AB SCN SERPL QL: SIGNIFICANT CHANGE UP
BUN SERPL-MCNC: 30 MG/DL — HIGH (ref 10–20)
CALCIUM SERPL-MCNC: 9.2 MG/DL — SIGNIFICANT CHANGE UP (ref 8.5–10.1)
CHLORIDE SERPL-SCNC: 105 MMOL/L — SIGNIFICANT CHANGE UP (ref 98–110)
CO2 SERPL-SCNC: 23 MMOL/L — SIGNIFICANT CHANGE UP (ref 17–32)
CREAT SERPL-MCNC: 1.5 MG/DL — SIGNIFICANT CHANGE UP (ref 0.7–1.5)
EOSINOPHIL # BLD AUTO: 0.04 K/UL — SIGNIFICANT CHANGE UP (ref 0–0.7)
EOSINOPHIL NFR BLD AUTO: 1.7 % — SIGNIFICANT CHANGE UP (ref 0–8)
ESTIMATED AVERAGE GLUCOSE: 117 MG/DL — HIGH (ref 68–114)
GIANT PLATELETS BLD QL SMEAR: PRESENT — SIGNIFICANT CHANGE UP
GLUCOSE SERPL-MCNC: 97 MG/DL — SIGNIFICANT CHANGE UP (ref 70–99)
HCT VFR BLD CALC: 19.9 % — LOW (ref 37–47)
HGB BLD-MCNC: 5.8 G/DL — CRITICAL LOW (ref 12–16)
HYPOCHROMIA BLD QL: SIGNIFICANT CHANGE UP
INR BLD: 0.98 RATIO — SIGNIFICANT CHANGE UP (ref 0.65–1.3)
LYMPHOCYTES # BLD AUTO: 0.82 K/UL — LOW (ref 1.2–3.4)
LYMPHOCYTES # BLD AUTO: 31.6 % — SIGNIFICANT CHANGE UP (ref 20.5–51.1)
MANUAL SMEAR VERIFICATION: SIGNIFICANT CHANGE UP
MCHC RBC-ENTMCNC: 21.9 PG — LOW (ref 27–31)
MCHC RBC-ENTMCNC: 29.1 G/DL — LOW (ref 32–37)
MCV RBC AUTO: 75.1 FL — LOW (ref 81–99)
MICROCYTES BLD QL: SLIGHT — SIGNIFICANT CHANGE UP
MONOCYTES # BLD AUTO: 0.11 K/UL — SIGNIFICANT CHANGE UP (ref 0.1–0.6)
MONOCYTES NFR BLD AUTO: 4.4 % — SIGNIFICANT CHANGE UP (ref 1.7–9.3)
MRSA PCR RESULT.: NEGATIVE — SIGNIFICANT CHANGE UP
NEUTROPHILS # BLD AUTO: 1.52 K/UL — SIGNIFICANT CHANGE UP (ref 1.4–6.5)
NEUTROPHILS NFR BLD AUTO: 57.9 % — SIGNIFICANT CHANGE UP (ref 42.2–75.2)
NEUTS BAND # BLD: 0.9 % — SIGNIFICANT CHANGE UP (ref 0–6)
NRBC # BLD: 1 /100 — HIGH (ref 0–0)
NRBC # BLD: SIGNIFICANT CHANGE UP /100 WBCS (ref 0–0)
PLAT MORPH BLD: NORMAL — SIGNIFICANT CHANGE UP
PLATELET # BLD AUTO: 357 K/UL — SIGNIFICANT CHANGE UP (ref 130–400)
POIKILOCYTOSIS BLD QL AUTO: SLIGHT — SIGNIFICANT CHANGE UP
POLYCHROMASIA BLD QL SMEAR: SLIGHT — SIGNIFICANT CHANGE UP
POTASSIUM SERPL-MCNC: 5 MMOL/L — SIGNIFICANT CHANGE UP (ref 3.5–5)
POTASSIUM SERPL-SCNC: 5 MMOL/L — SIGNIFICANT CHANGE UP (ref 3.5–5)
PROT SERPL-MCNC: 6.8 G/DL — SIGNIFICANT CHANGE UP (ref 6–8)
PROTHROM AB SERPL-ACNC: 11.3 SEC — SIGNIFICANT CHANGE UP (ref 9.95–12.87)
RBC # BLD: 2.65 M/UL — LOW (ref 4.2–5.4)
RBC # FLD: 18.5 % — HIGH (ref 11.5–14.5)
RBC BLD AUTO: ABNORMAL
SODIUM SERPL-SCNC: 136 MMOL/L — SIGNIFICANT CHANGE UP (ref 135–146)
TARGETS BLD QL SMEAR: SLIGHT — SIGNIFICANT CHANGE UP
WBC # BLD: 2.58 K/UL — LOW (ref 4.8–10.8)
WBC # FLD AUTO: 2.58 K/UL — LOW (ref 4.8–10.8)

## 2020-10-27 PROCEDURE — 71046 X-RAY EXAM CHEST 2 VIEWS: CPT | Mod: 26

## 2020-10-27 PROCEDURE — 93010 ELECTROCARDIOGRAM REPORT: CPT

## 2020-10-27 PROCEDURE — 72170 X-RAY EXAM OF PELVIS: CPT | Mod: 26

## 2020-10-27 PROCEDURE — 73562 X-RAY EXAM OF KNEE 3: CPT | Mod: 26,LT

## 2020-10-27 PROCEDURE — 72050 X-RAY EXAM NECK SPINE 4/5VWS: CPT | Mod: 26

## 2020-10-27 NOTE — H&P PST ADULT - RS GEN PE MLT RESP DETAILS PC
breath sounds equal/clear to auscultation bilaterally/respirations non-labored/good air movement/no chest wall tenderness/normal/airway patent

## 2020-10-27 NOTE — H&P PST ADULT - REASON FOR ADMISSION
PT PRESENTS TO PAST WITH NO SOB, CP, PALPITATIONS, DYSURIA, UTI OR URI AT PRESENT.   PT ABLE TO WALK UP 2-3 FLIGHTS OF STEPS WITH NO SOB.  AS PER THE PT, THIS IS HIS/HER COMPLETE MEDICAL AND SURGICAL HX, INCLUDING MEDICATIONS PRESCRIBED AND OVER THE COUNTER  pt denies any covid s/s, or tested positive in the past  pt advised self quarantine till day of procedure  denies travel outside the [place] in the past 30 days  Anesthesia Alert  NO--Difficult Airway  NO--History of neck surgery or radiation  YES --Limited ROM of neck-PT C/O DISCOMFORT IN NECK PRESENTLY.   NO--History of Malignant hyperthermia  NO--Personal or family history of Pseudocholinesterase deficiency  NO--Prior Anesthesia Complication  NO--Latex Allergy  NO--Loose teeth  YES --History of Rheumatoid Arthritis  NO--TING  NO--Other_____  PT SCHEDULED FOR - LEFT TOTAL KNEE REPLACEMENT. PT REPORTS- I HAVE HAD DISCOMFORT IN MY LEFT KNEE FOR SEVERAL YEARS. PT PRESENTS TO PAST WITH NO SOB, CP, PALPITATIONS, DYSURIA, UTI OR URI AT PRESENT.   PT ABLE TO WALK UP 2-3 FLIGHTS OF STEPS WITH NO SOB.  AS PER THE PT, THIS IS HIS/HER COMPLETE MEDICAL AND SURGICAL HX, INCLUDING MEDICATIONS PRESCRIBED AND OVER THE COUNTER  pt denies any covid s/s, or tested positive in the past  pt advised self quarantine till day of procedure  denies travel outside the [place] in the past 30 days  Anesthesia Alert  NO--Difficult Airway  NO--History of neck surgery or radiation  YES --Limited ROM of neck-PT C/O DISCOMFORT IN NECK PRESENTLY.   NO--History of Malignant hyperthermia  NO--Personal or family history of Pseudocholinesterase deficiency  NO--Prior Anesthesia Complication  NO--Latex Allergy  NO--Loose teeth  YES --History of Rheumatoid Arthritis  NO--TING  NO--Other_____  PT SCHEDULED FOR - LEFT TOTAL KNEE REPLACEMENT. PT REPORTS- I HAVE HAD DISCOMFORT IN MY LEFT KNEE FOR SEVERAL YEARS.  2:30 pm- note amended- lab values drawn form 11 am visit show h &h 5.8/19.9. gfr 38 and bun 30.   Pt called on cell phone and home phone. no answer messages left for both numbers.  Lab reports indicated Dr Rick was notified.  Dr Moseley also notified.  Elvira from Dr Rick office notied. Dr Rick will recall pt.

## 2020-10-27 NOTE — H&P PST ADULT - NSANTHOSAYNRD_GEN_A_CORE
No. TING screening performed.  STOP BANG Legend: 0-2 = LOW Risk; 3-4 = INTERMEDIATE Risk; 5-8 = HIGH Risk

## 2020-10-27 NOTE — H&P PST ADULT - NSICDXPASTSURGICALHX_GEN_ALL_CORE_FT
PAST SURGICAL HISTORY:  Colon polyp colonoscopy q 6m    History of cholecystectomy     History of dilatation and curettage April 2018    History of surgery gastric bypass 2003    History of surgery repaired left achilles tendon 6/2018    S/P left knee arthroscopy bilateral knee arthroscopy 2015

## 2020-10-27 NOTE — H&P PST ADULT - HISTORY OF PRESENT ILLNESS
***16:00 10/27/20  Called patient multiple times today to notify of lab result.  Pt answered on this attempt.  I d/w patient her Hbg level.  She denies sx today but states she did pass out 2 days ago.  I recommended that she go to the ER for further evaluation by 911.  Pt states she will go to the ER but needs to take care of a few things first.  We discussed the risks associated with not going and she verbalized understanding to me.

## 2020-10-27 NOTE — H&P PST ADULT - CONSTITUTIONAL COMMENTS
PT REPORTS- DECREASED ROM ,FLEXION  AND EXTENSION  WITH NECK.  PT C/O HER ROM OF NECK HAS DECREASED SINCE 2018.

## 2020-10-28 ENCOUNTER — INPATIENT (INPATIENT)
Facility: HOSPITAL | Age: 60
LOS: 2 days | Discharge: HOME | End: 2020-10-31
Attending: INTERNAL MEDICINE | Admitting: INTERNAL MEDICINE
Payer: MEDICARE

## 2020-10-28 VITALS
TEMPERATURE: 98 F | SYSTOLIC BLOOD PRESSURE: 138 MMHG | RESPIRATION RATE: 18 BRPM | DIASTOLIC BLOOD PRESSURE: 79 MMHG | OXYGEN SATURATION: 99 % | HEIGHT: 66 IN | HEART RATE: 82 BPM

## 2020-10-28 DIAGNOSIS — Z98.890 OTHER SPECIFIED POSTPROCEDURAL STATES: Chronic | ICD-10-CM

## 2020-10-28 DIAGNOSIS — K63.5 POLYP OF COLON: Chronic | ICD-10-CM

## 2020-10-28 LAB
ALBUMIN SERPL ELPH-MCNC: 3.9 G/DL — SIGNIFICANT CHANGE UP (ref 3.5–5.2)
ALP SERPL-CCNC: 86 U/L — SIGNIFICANT CHANGE UP (ref 30–115)
ALT FLD-CCNC: 8 U/L — SIGNIFICANT CHANGE UP (ref 0–41)
ANION GAP SERPL CALC-SCNC: 11 MMOL/L — SIGNIFICANT CHANGE UP (ref 7–14)
AST SERPL-CCNC: 22 U/L — SIGNIFICANT CHANGE UP (ref 0–41)
BASOPHILS # BLD AUTO: 0.03 K/UL — SIGNIFICANT CHANGE UP (ref 0–0.2)
BASOPHILS NFR BLD AUTO: 0.9 % — SIGNIFICANT CHANGE UP (ref 0–1)
BILIRUB SERPL-MCNC: 0.3 MG/DL — SIGNIFICANT CHANGE UP (ref 0.2–1.2)
BLD GP AB SCN SERPL QL: SIGNIFICANT CHANGE UP
BUN SERPL-MCNC: 27 MG/DL — HIGH (ref 10–20)
CALCIUM SERPL-MCNC: 9.3 MG/DL — SIGNIFICANT CHANGE UP (ref 8.5–10.1)
CHLORIDE SERPL-SCNC: 107 MMOL/L — SIGNIFICANT CHANGE UP (ref 98–110)
CO2 SERPL-SCNC: 22 MMOL/L — SIGNIFICANT CHANGE UP (ref 17–32)
CREAT SERPL-MCNC: 1.4 MG/DL — SIGNIFICANT CHANGE UP (ref 0.7–1.5)
EOSINOPHIL # BLD AUTO: 0.03 K/UL — SIGNIFICANT CHANGE UP (ref 0–0.7)
EOSINOPHIL NFR BLD AUTO: 0.9 % — SIGNIFICANT CHANGE UP (ref 0–8)
GIANT PLATELETS BLD QL SMEAR: PRESENT — SIGNIFICANT CHANGE UP
GLUCOSE SERPL-MCNC: 79 MG/DL — SIGNIFICANT CHANGE UP (ref 70–99)
HCG SERPL QL: NEGATIVE — SIGNIFICANT CHANGE UP
HCT VFR BLD CALC: 19.7 % — LOW (ref 37–47)
HGB BLD-MCNC: 5.8 G/DL — CRITICAL LOW (ref 12–16)
HYPOCHROMIA BLD QL: SIGNIFICANT CHANGE UP
LYMPHOCYTES # BLD AUTO: 0.58 K/UL — LOW (ref 1.2–3.4)
LYMPHOCYTES # BLD AUTO: 18.4 % — LOW (ref 20.5–51.1)
MANUAL SMEAR VERIFICATION: SIGNIFICANT CHANGE UP
MCHC RBC-ENTMCNC: 21.7 PG — LOW (ref 27–31)
MCHC RBC-ENTMCNC: 29.4 G/DL — LOW (ref 32–37)
MCV RBC AUTO: 73.8 FL — LOW (ref 81–99)
MICROCYTES BLD QL: SLIGHT — SIGNIFICANT CHANGE UP
MONOCYTES # BLD AUTO: 0.22 K/UL — SIGNIFICANT CHANGE UP (ref 0.1–0.6)
MONOCYTES NFR BLD AUTO: 7 % — SIGNIFICANT CHANGE UP (ref 1.7–9.3)
NEUTROPHILS # BLD AUTO: 2.3 K/UL — SIGNIFICANT CHANGE UP (ref 1.4–6.5)
NEUTROPHILS NFR BLD AUTO: 72.8 % — SIGNIFICANT CHANGE UP (ref 42.2–75.2)
PLAT MORPH BLD: NORMAL — SIGNIFICANT CHANGE UP
PLATELET # BLD AUTO: 343 K/UL — SIGNIFICANT CHANGE UP (ref 130–400)
POIKILOCYTOSIS BLD QL AUTO: SLIGHT — SIGNIFICANT CHANGE UP
POLYCHROMASIA BLD QL SMEAR: SIGNIFICANT CHANGE UP
POTASSIUM SERPL-MCNC: 4.9 MMOL/L — SIGNIFICANT CHANGE UP (ref 3.5–5)
POTASSIUM SERPL-SCNC: 4.9 MMOL/L — SIGNIFICANT CHANGE UP (ref 3.5–5)
PROT SERPL-MCNC: 6.8 G/DL — SIGNIFICANT CHANGE UP (ref 6–8)
RBC # BLD: 2.67 M/UL — LOW (ref 4.2–5.4)
RBC # FLD: 18.4 % — HIGH (ref 11.5–14.5)
RBC BLD AUTO: ABNORMAL
SMUDGE CELLS # BLD: PRESENT — SIGNIFICANT CHANGE UP
SODIUM SERPL-SCNC: 140 MMOL/L — SIGNIFICANT CHANGE UP (ref 135–146)
TROPONIN T SERPL-MCNC: <0.01 NG/ML — SIGNIFICANT CHANGE UP
WBC # BLD: 3.16 K/UL — LOW (ref 4.8–10.8)
WBC # FLD AUTO: 3.16 K/UL — LOW (ref 4.8–10.8)

## 2020-10-28 PROCEDURE — 99223 1ST HOSP IP/OBS HIGH 75: CPT

## 2020-10-28 PROCEDURE — 93010 ELECTROCARDIOGRAM REPORT: CPT

## 2020-10-28 PROCEDURE — 71045 X-RAY EXAM CHEST 1 VIEW: CPT | Mod: 26

## 2020-10-28 PROCEDURE — 99285 EMERGENCY DEPT VISIT HI MDM: CPT

## 2020-10-28 RX ORDER — CYCLOBENZAPRINE HYDROCHLORIDE 10 MG/1
10 TABLET, FILM COATED ORAL THREE TIMES A DAY
Refills: 0 | Status: DISCONTINUED | OUTPATIENT
Start: 2020-10-28 | End: 2020-10-31

## 2020-10-28 RX ORDER — DULOXETINE HYDROCHLORIDE 30 MG/1
60 CAPSULE, DELAYED RELEASE ORAL ONCE
Refills: 0 | Status: COMPLETED | OUTPATIENT
Start: 2020-10-28 | End: 2020-10-29

## 2020-10-28 RX ORDER — CHLORHEXIDINE GLUCONATE 213 G/1000ML
1 SOLUTION TOPICAL
Refills: 0 | Status: DISCONTINUED | OUTPATIENT
Start: 2020-10-28 | End: 2020-10-31

## 2020-10-28 RX ORDER — PANTOPRAZOLE SODIUM 20 MG/1
40 TABLET, DELAYED RELEASE ORAL EVERY 12 HOURS
Refills: 0 | Status: DISCONTINUED | OUTPATIENT
Start: 2020-10-28 | End: 2020-10-31

## 2020-10-28 RX ORDER — DIPHENHYDRAMINE HCL 50 MG
25 CAPSULE ORAL ONCE
Refills: 0 | Status: COMPLETED | OUTPATIENT
Start: 2020-10-28 | End: 2020-10-29

## 2020-10-28 RX ORDER — DULOXETINE HYDROCHLORIDE 30 MG/1
60 CAPSULE, DELAYED RELEASE ORAL AT BEDTIME
Refills: 0 | Status: DISCONTINUED | OUTPATIENT
Start: 2020-10-29 | End: 2020-10-31

## 2020-10-28 RX ORDER — SODIUM CHLORIDE 9 MG/ML
1000 INJECTION, SOLUTION INTRAVENOUS
Refills: 0 | Status: DISCONTINUED | OUTPATIENT
Start: 2020-10-28 | End: 2020-10-30

## 2020-10-28 RX ORDER — DULOXETINE HYDROCHLORIDE 30 MG/1
1 CAPSULE, DELAYED RELEASE ORAL
Qty: 0 | Refills: 0 | DISCHARGE

## 2020-10-28 NOTE — ED ADULT NURSE NOTE - PSH
Colon polyp    History of cholecystectomy    History of dilatation and curettage  April 2018  History of surgery  repaired left achilles tendon 6/2018  History of surgery  gastric bypass 2003  S/P left knee arthroscopy  bilateral knee arthroscopy 2015

## 2020-10-28 NOTE — ED PROVIDER NOTE - OBJECTIVE STATEMENT
60 y.o female w/ hx of ovarian cysts, fibromyalgia, depression, 60 y.o female w/ hx of ovarian cysts, fibromyalgia, depression presents to the ED for evaluation of abnormal lab. Went to PAST yesterday for routine pre-op labs, found to have hgb 5.8 and was sent to the ED for further eval.  Reports syncopal episode 2 days ago after walking up 5 flights of stairs. No preceding sxs. Also reports generalized fatigue and LA for past few weeks.  states last period was 10/15 which was "light."  NO black or bloody stool.  NO melena, hematochezia, chest pain, dyspnea at rest.

## 2020-10-28 NOTE — H&P ADULT - NSHPPHYSICALEXAM_GEN_ALL_CORE
GENERAL: NAD, speaks in full sentences, no signs of respiratory distress  HEAD: Atraumatic  NECK: Supple  CHEST/LUNG: Clear to auscultation bilaterally; No wheeze or crackles  HEART: S1, S2; RRR; No murmurs, rubs, or gallops  ABDOMEN: BS+; Soft, Non-tender, Non-distended. scar along lower abdomen  EXTREMITIES:  2+ Peripheral Pulses, No clubbing, cyanosis, or edema  PSYCH: AAOx3  NEUROLOGY: non-focal  SKIN: No rashes or lesions

## 2020-10-28 NOTE — ED PROVIDER NOTE - CLINICAL SUMMARY MEDICAL DECISION MAKING FREE TEXT BOX
Pt in ER with c/o low hgb found on pre-op labs yesterday.  Pt also reports episode of exertional syncope 2 days ago.  denies h/o GIB, no dark or bloody stool, no blood on rectal exam.  hgb 5.8.  ekg normal.  pt admitted for prbc transfusion/symptomatic anemia.

## 2020-10-28 NOTE — H&P ADULT - NSHPLABSRESULTS_GEN_ALL_CORE
5.8    3.16  )-----------( 343      ( 28 Oct 2020 15:00 )             19.7       10-28    140  |  107  |  27<H>  ----------------------------<  79  4.9   |  22  |  1.4    Ca    9.3      28 Oct 2020 15:00    TPro  6.8  /  Alb  3.9  /  TBili  0.3  /  DBili  x   /  AST  22  /  ALT  8   /  AlkPhos  86  10-28      PT/INR - ( 27 Oct 2020 11:48 )   PT: 11.30 sec;   INR: 0.98 ratio         PTT - ( 27 Oct 2020 11:48 )  PTT:35.7 sec

## 2020-10-28 NOTE — H&P ADULT - NSICDXFAMILYHX_GEN_ALL_CORE_FT
FAMILY HISTORY:  FHx: arthritis, Father  FHx: colon cancer, Mother  FHx: diabetes mellitus, Mother  FHx: hypertension, Mother

## 2020-10-28 NOTE — H&P ADULT - ASSESSMENT
59 y/o female with PMH of RA on indomethacin, hx of bleeding gastric ulcer, obesity s/p gastric bypass in 2003, depression, fibromylagia, s/p surgical removal of 7 fibroids, presents to the ED with acute on chronic microcytic anemia and lightheadedness. Patient was getting pre-op testing for knee replacement when Hb was found to be 5.8 and she was sent to the ED.     # Acute on Chronic Microcytic Anemia/symptomatic anemia - likely secondary to GI bleed (taking NSAIDS and "keto pill", hx of hematemesis following taking keto pill, s/p EGD found to have bleeding gastric ulcer, hx gastric bypass) vs. vaginal source (while patient does have hx of fibroids, she had all 7 successfully removed in past, no longer has menstrual period, has very minimal spotting once or twice around 15th of month) vs. other  - Hb 5.8 (MCV 74). Plt 343. s/p 2 units PRBC in ED.   - last EGD/colo in 2018.   - f/u repeat CBC, iron studies, B12, folate at 11:30 and AM  - hold indomethacin  - keep NPO. IV protonix 40 mg BID   - transfuse to keep Hb >7  - fecal blood immunology.     # RA  - hold indomethacin for now    # obesity s/p gastric bypass  - OP f/u    # depression/fibromyalgia  - continue home meds    DVT ppx: SCD  GI ppx: IV protonix for now  increase as tolerated  Dispo: acute, from home  FULL CODE        61 y/o female with PMH of RA on indomethacin, hx of bleeding gastric ulcer, obesity s/p gastric bypass in 2003, depression, fibromylagia, s/p surgical removal of 7 fibroids, presents to the ED with acute on chronic microcytic anemia and lightheadedness. Patient was getting pre-op testing for knee replacement when Hb was found to be 5.8 and she was sent to the ED.     # Acute on Chronic Microcytic Anemia/symptomatic anemia - likely secondary to GI bleed (taking NSAIDS and "keto pill", hx of hematemesis following taking keto pill, s/p EGD found to have bleeding gastric ulcer, hx gastric bypass) vs. vaginal source (while patient does have hx of fibroids, she had all 7 successfully removed in past, no longer has menstrual period, has very minimal spotting once or twice around 15th of month) vs. other  - Hb 5.8 (MCV 74). Plt 343. s/p 2 units PRBC in ED.   - last EGD/colo in 2018.   - f/u repeat CBC, iron studies, B12, folate at 11:30 and AM  - hold indomethacin  - keep NPO. IV protonix 40 mg BID   - transfuse to keep Hb >7  - fecal blood immunology.   - f/u GI consult. (Patient states if she needs ob/gyn consult she does not want Dr Carter. see previous chart note from Dr. Carter).     # RA  - hold indomethacin for now    # obesity s/p gastric bypass  - OP f/u    # depression/fibromyalgia  - continue home meds    DVT ppx: SCD  GI ppx: IV protonix for now  increase as tolerated  Dispo: acute, from home  FULL CODE

## 2020-10-28 NOTE — H&P ADULT - ATTENDING COMMENTS
I saw and evaluated the patient. I have reviewed and agree with the findings and plan of care as documented above in the resident’s note (unless indicated differently below). Any necessary changes were made in the body of the text. I saw and evaluated the patient on 10/28/2020. I have reviewed and agree with the findings and plan of care as documented above in the resident’s note (unless indicated differently below). Any necessary changes were made in the body of the text.    CC: lightheadedness, fatigue, referred to ED for hgb 5.8 on pre-op w/u    HPI:  59 yo F pt w/ a relevant hx of RA (on indomethacin and tofacitinib), PUD and uterine fibroids (removed in 2019). P/w lightheadedness, fatigue, generalized weakness, mild SOB w/ excessive exertion and a syncopal event at home 3 days prior to presentation. Was found to have a hgb lvl of 5.8 on out-pt, pre-op (knee sx) w/u and was referred to the ED for further management. Denies any melena, hematemesis, hematochezia or excessive vaginal bleeding (only scant spotting). Reports a hx of melena and hematemesis in 2018 when she underwent EGD and was diagnosed w/ a gastric ulcer. No other concerns/complaints except as aforementioned.    ROS:  Constitutional: no fevers; no chills; +fatigue; +lightheadedness; +generalized weakness  Eyes: no conjunctivitis; no itching  ENT: no dysphagia; no odynophagia  CVS: no PND; no orthopnea; no chest pain  Resp: +SOB (mild w/ excessive exertion - feels winded); no coughing  GI: no nausea; no vomiting (+reports nausea & NBNB vomiting at home); no diarrhea; no abd pain; no melena; no hematochezia  : no dysuria; no hematuria  MSK: +knee & LE aches/pains (chronic, +stiffness upon awakening in AM)  Skin: no rashes; no ulcers  Neuro: no headache  All other systems reviewed and are negative    PMHx, home medications, SurHx, FHx and Social history as above in the corresponding sections of the note - reviewed and edited where appropriate    Exam:  Vitals: BP = 146/77; P = 77; T = 97.8; RR = 18; SpO2 > 95 on room air  General: appears stated age; cooperative  Eyes: anicteric sclera; moist conjunctiva; PERRL; EOMI  HENT: NC/AT; clear oropharynx w/ moist mucous membranes; nL hard/soft palate  Neck: supple w/ FROM; trachea midline  Lungs: no tachypnea, accessory muscle use, wheezing or rhonci  CVS: RRR; S1 and S2 w/o MRGs  Abd: BS+; soft; non-tender; no masses; no HSM  Ext: no peripheral edema; pulses palpable distally b/l  Skin: normal temp, turgor and texture; no rashes, ulcers or nodules  Neuro: CN II-XII intact; str grossly intact  Psych: appropriate affect; alert and oriented to person, place, time and situation    Labs reviewed - significant for H&H 5.8/19.7 > 7.9/25.6, MCV 73.8, trans sat 9%, BUN/Cr 27/1.4  Imaging reviewed - no acute cardiopulmonary process on CXR  EKG reviewed - NSR    Assessment:  (1) Symptomatic microcytic Fe deficiency anemia: possible UGI hemorrhage  --- Hx of peptic ulcer disease and GERD  --- Hx of indomethacin use  (2) Mild LEX (likely pre-renal) on possible underlying CKD 2  (3) Rheumatoid arthritis - stable (on tofacitinib and indomethacin):  --- Tofacitinib can cause anemia  --- RA itself can be assoc w/ AOCD and Fe def anemia often 2/2 NSAID use  (4) Hx of gastric bypass: stable  (5) Fibromyalgia: chronic & stable  (6) Depression: stable mood w/o SI or HI & denies aud/vis hallucinations    Plan:  (1) Monitor for bleeding or signs/symptoms of anemia  (2) Trend H&H q8hrly  (3) Maintain active T&S  (4) Large bore IV's  (5) Transfuse PRN based on clinical picture & hgb level  (6) Hold NSAIDs and tofacitinib for now  (7) PPI 40 mg IV bid  (8) EGD - GI evaluation requested  (9) Daily BUN/Cr; check U/A  (10) Medications as dosed - reviewed and edited where appropriate  (11) Supportive care: PRN analgesics (avoid NSAID's), anti-emetics  (12) Dispo: not d/c ready; pending EGD; pending stabilization of H&H    Full code

## 2020-10-28 NOTE — ED PROVIDER NOTE - ATTENDING CONTRIBUTION TO CARE
61 y/o female with h/o FA, in ER with c/o low hgb on outpt labs.  Pt was getting pre-op testing for a planned knee replacement surgery yesterday  and was called hor low hgb.  Pt states she does have h/o anemia, but has never been transfused before.  Is aubrey-menopausal, has a scant amount of vaginal spotting every month, no VB.  Denies any rectal bleeding or dark stool. Had colonoscopy last year and had polyp(?s) removed.  no h/o GIB.  no cp/sob.  + has been feeling weak and tired.  She reports syncopal episode after walking up 5 flights of stairs to her apartment a few days ago.  Did not hit head.  no c/o head/neck pain.    PE - nad, nc/at, eomi, perrl, + pale conjunctiva, mmm, cta b/l, no w/r/r, rrr, abd - soft, nt/nd, nabs, rectal - + brown stool, no blood, from x 4, A&O x 3, no focal neuro deficits.  -check labs, t&s, transfuse as needed. 59 y/o female with h/o OA, fibromyalgia, in ER with c/o low hgb on outpt labs.  Pt was getting pre-op testing for a planned knee replacement surgery yesterday and was called hor low hgb.  Pt states she does have h/o anemia, but has never been transfused before.  Is aubrey-menopausal, has a scant amount of vaginal spotting every month, no VB.  Denies any rectal bleeding or dark stool. Had colonoscopy last year and had polyp(?s) removed.  no h/o GIB.  no cp/sob.  + has been feeling weak and tired.  She reports syncopal episode after walking up 5 flights of stairs to her apartment a few days ago.  Did not hit head.  no c/o head/neck pain.    PE - nad, nc/at, eomi, perrl, + pale conjunctiva, mmm, cta b/l, no w/r/r, rrr, abd - soft, nt/nd, nabs, rectal - + brown stool, no blood, from x 4, A&O x 3, no focal neuro deficits.  -check labs, t&s, transfuse as needed.

## 2020-10-28 NOTE — ED ADULT NURSE NOTE - OBJECTIVE STATEMENT
pt send in after blood work done at pre-testing with low h/h, pt states she also had near syncopal episodes recently.

## 2020-10-28 NOTE — ED PROVIDER NOTE - NS ED ROS FT
Constitutional: See HPI.  Eyes: No visual changes, eye pain or discharge. No Photophobia  ENMT: No hearing changes, pain, discharge or infections. No neck pain or stiffness. No limited ROM  Cardiac: + syncope. No SOB or edema. No chest pain with exertion.  Respiratory: No cough or respiratory distress.  GI: No nausea, vomiting, diarrhea or abdominal pain.  : No dysuria, frequency or burning. No Discharge  MS: No myalgia, muscle weakness, joint pain or back pain.  Neuro: No headache or weakness. No LOC.  Skin: No skin rash.  Except as documented in the HPI, all other systems are negative.

## 2020-10-28 NOTE — H&P ADULT - NSICDXPASTSURGICALHX_GEN_ALL_CORE_FT
PAST SURGICAL HISTORY:  Colon polyp     History of cholecystectomy     History of dilatation and curettage April 2018    History of surgery gastric bypass 2003    History of surgery repaired left achilles tendon 6/2018    S/P left knee arthroscopy bilateral knee arthroscopy 2015

## 2020-10-28 NOTE — ED PROVIDER NOTE - PHYSICAL EXAMINATION
CONST: Well appearing in NAD  EYES: pale conjunctiva   NECK: Non-tender, no meningeal signs, supple  CARD: Normal S1 S2; Normal rate and rhythm  RESP: Equal BS B/L, No wheezes, rhonchi or rales. No distress  GI: Soft, non-tender, non-distended.  MAISHA: see MD exam  MS: Normal ROM in all extremities. No edema of lower extremities, no calf pain, radial pulses 2+ bilaterally  SKIN: Warm, dry, no acute rashes. Good turgor  NEURO: A&Ox3, No focal deficits. Strength 5/5 with no sensory deficits

## 2020-10-28 NOTE — H&P ADULT - HISTORY OF PRESENT ILLNESS
61 y/o female with PMH of RA on indomethacin, hx of bleeding gastric ulcer, obesity s/p gastric bypass in 2003, depression, fibromylagia, s/p surgical removal of 7 fibroids, presents to the ED with acute on chronic microcytic anemia and lightheadedness. Patient was getting pre-op testing for knee replacement when Hb was found to be 5.8 and she was sent to the ED. Patient says she has a hx of anemia but has never been transfused before. Patient is aubrey-menopausal, has a scant amount of vaginal spotting every month, no VB around the 15th.  Denies any rectal bleeding or dark stool. Had colonoscopy in 2018 and had polyp removed. After doing a keto diet she had an episode of hematemesis in 2018 and underwent EGD and found to have bleeding gastric ulcer. She also had syncopal episode after walking up 5 flights of stairs in her house 3 days ago. Did not hit head. Associated with increasing fatigue. No children. Denies headache, chest pain, palpitations, SOB, abdominal pain, N/V/D/constipation, urinary symptoms, or lower extremity edema.     In ED, /79, HR 82. temp 98.1. Sating 99% on RA. Hb 5.8 (MCV 74). Plt 343. s/p 2 units PRBC in ED. To be admitted to medicine for further workup and management.

## 2020-10-28 NOTE — ED ADULT NURSE NOTE - PMH
Anxiety    Depression  no suicide ideation  Fibromyalgia    OA (osteoarthritis)    Obesity    PTSD (post-traumatic stress disorder)    Rheumatoid arthritis    Sciatica  with numbness/tingling and dizziness

## 2020-10-29 LAB
ALBUMIN SERPL ELPH-MCNC: 3.5 G/DL — SIGNIFICANT CHANGE UP (ref 3.5–5.2)
ALP SERPL-CCNC: 83 U/L — SIGNIFICANT CHANGE UP (ref 30–115)
ALT FLD-CCNC: 10 U/L — SIGNIFICANT CHANGE UP (ref 0–41)
ANION GAP SERPL CALC-SCNC: 9 MMOL/L — SIGNIFICANT CHANGE UP (ref 7–14)
AST SERPL-CCNC: 28 U/L — SIGNIFICANT CHANGE UP (ref 0–41)
BASOPHILS # BLD AUTO: 0.02 K/UL — SIGNIFICANT CHANGE UP (ref 0–0.2)
BASOPHILS # BLD AUTO: 0.03 K/UL — SIGNIFICANT CHANGE UP (ref 0–0.2)
BASOPHILS NFR BLD AUTO: 0.5 % — SIGNIFICANT CHANGE UP (ref 0–1)
BASOPHILS NFR BLD AUTO: 0.7 % — SIGNIFICANT CHANGE UP (ref 0–1)
BILIRUB SERPL-MCNC: 1.2 MG/DL — SIGNIFICANT CHANGE UP (ref 0.2–1.2)
BUN SERPL-MCNC: 18 MG/DL — SIGNIFICANT CHANGE UP (ref 10–20)
CALCIUM SERPL-MCNC: 9 MG/DL — SIGNIFICANT CHANGE UP (ref 8.5–10.1)
CHLORIDE SERPL-SCNC: 107 MMOL/L — SIGNIFICANT CHANGE UP (ref 98–110)
CO2 SERPL-SCNC: 21 MMOL/L — SIGNIFICANT CHANGE UP (ref 17–32)
CREAT SERPL-MCNC: 1.3 MG/DL — SIGNIFICANT CHANGE UP (ref 0.7–1.5)
EOSINOPHIL # BLD AUTO: 0.1 K/UL — SIGNIFICANT CHANGE UP (ref 0–0.7)
EOSINOPHIL # BLD AUTO: 0.11 K/UL — SIGNIFICANT CHANGE UP (ref 0–0.7)
EOSINOPHIL NFR BLD AUTO: 2.3 % — SIGNIFICANT CHANGE UP (ref 0–8)
EOSINOPHIL NFR BLD AUTO: 2.7 % — SIGNIFICANT CHANGE UP (ref 0–8)
FERRITIN SERPL-MCNC: 8 NG/ML — LOW (ref 15–150)
GLUCOSE SERPL-MCNC: 82 MG/DL — SIGNIFICANT CHANGE UP (ref 70–99)
HCT VFR BLD CALC: 25.6 % — LOW (ref 37–47)
HCT VFR BLD CALC: 25.6 % — LOW (ref 37–47)
HCV AB S/CO SERPL IA: 0.04 COI — SIGNIFICANT CHANGE UP
HCV AB SERPL-IMP: SIGNIFICANT CHANGE UP
HGB BLD-MCNC: 7.9 G/DL — LOW (ref 12–16)
HGB BLD-MCNC: 8 G/DL — LOW (ref 12–16)
IMM GRANULOCYTES NFR BLD AUTO: 0.2 % — SIGNIFICANT CHANGE UP (ref 0.1–0.3)
IMM GRANULOCYTES NFR BLD AUTO: 0.2 % — SIGNIFICANT CHANGE UP (ref 0.1–0.3)
IRON SATN MFR SERPL: 34 UG/DL — LOW (ref 35–150)
IRON SATN MFR SERPL: 9 % — LOW (ref 15–50)
LYMPHOCYTES # BLD AUTO: 0.8 K/UL — LOW (ref 1.2–3.4)
LYMPHOCYTES # BLD AUTO: 1.29 K/UL — SIGNIFICANT CHANGE UP (ref 1.2–3.4)
LYMPHOCYTES # BLD AUTO: 18.6 % — LOW (ref 20.5–51.1)
LYMPHOCYTES # BLD AUTO: 31.3 % — SIGNIFICANT CHANGE UP (ref 20.5–51.1)
MAGNESIUM SERPL-MCNC: 2.1 MG/DL — SIGNIFICANT CHANGE UP (ref 1.8–2.4)
MCHC RBC-ENTMCNC: 24.2 PG — LOW (ref 27–31)
MCHC RBC-ENTMCNC: 24.3 PG — LOW (ref 27–31)
MCHC RBC-ENTMCNC: 30.9 G/DL — LOW (ref 32–37)
MCHC RBC-ENTMCNC: 31.3 G/DL — LOW (ref 32–37)
MCV RBC AUTO: 77.8 FL — LOW (ref 81–99)
MCV RBC AUTO: 78.5 FL — LOW (ref 81–99)
MONOCYTES # BLD AUTO: 0.55 K/UL — SIGNIFICANT CHANGE UP (ref 0.1–0.6)
MONOCYTES # BLD AUTO: 0.58 K/UL — SIGNIFICANT CHANGE UP (ref 0.1–0.6)
MONOCYTES NFR BLD AUTO: 12.8 % — HIGH (ref 1.7–9.3)
MONOCYTES NFR BLD AUTO: 14.1 % — HIGH (ref 1.7–9.3)
NEUTROPHILS # BLD AUTO: 2.11 K/UL — SIGNIFICANT CHANGE UP (ref 1.4–6.5)
NEUTROPHILS # BLD AUTO: 2.81 K/UL — SIGNIFICANT CHANGE UP (ref 1.4–6.5)
NEUTROPHILS NFR BLD AUTO: 51.2 % — SIGNIFICANT CHANGE UP (ref 42.2–75.2)
NEUTROPHILS NFR BLD AUTO: 65.4 % — SIGNIFICANT CHANGE UP (ref 42.2–75.2)
NRBC # BLD: 0 /100 WBCS — SIGNIFICANT CHANGE UP (ref 0–0)
NRBC # BLD: 0 /100 WBCS — SIGNIFICANT CHANGE UP (ref 0–0)
PLATELET # BLD AUTO: 327 K/UL — SIGNIFICANT CHANGE UP (ref 130–400)
PLATELET # BLD AUTO: 344 K/UL — SIGNIFICANT CHANGE UP (ref 130–400)
POTASSIUM SERPL-MCNC: 5.1 MMOL/L — HIGH (ref 3.5–5)
POTASSIUM SERPL-SCNC: 5.1 MMOL/L — HIGH (ref 3.5–5)
PROT SERPL-MCNC: 6.2 G/DL — SIGNIFICANT CHANGE UP (ref 6–8)
RBC # BLD: 3.26 M/UL — LOW (ref 4.2–5.4)
RBC # BLD: 3.29 M/UL — LOW (ref 4.2–5.4)
RBC # FLD: 18.8 % — HIGH (ref 11.5–14.5)
RBC # FLD: 19.5 % — HIGH (ref 11.5–14.5)
SARS-COV-2 RNA SPEC QL NAA+PROBE: SIGNIFICANT CHANGE UP
SODIUM SERPL-SCNC: 137 MMOL/L — SIGNIFICANT CHANGE UP (ref 135–146)
TIBC SERPL-MCNC: 377 UG/DL — SIGNIFICANT CHANGE UP (ref 220–430)
TRANSFERRIN SERPL-MCNC: 318 MG/DL — SIGNIFICANT CHANGE UP (ref 200–360)
UIBC SERPL-MCNC: 343 UG/DL — SIGNIFICANT CHANGE UP (ref 110–370)
WBC # BLD: 4.12 K/UL — LOW (ref 4.8–10.8)
WBC # BLD: 4.3 K/UL — LOW (ref 4.8–10.8)
WBC # FLD AUTO: 4.12 K/UL — LOW (ref 4.8–10.8)
WBC # FLD AUTO: 4.3 K/UL — LOW (ref 4.8–10.8)

## 2020-10-29 PROCEDURE — 99233 SBSQ HOSP IP/OBS HIGH 50: CPT

## 2020-10-29 PROCEDURE — 99222 1ST HOSP IP/OBS MODERATE 55: CPT

## 2020-10-29 RX ORDER — SOD SULF/SODIUM/NAHCO3/KCL/PEG
4000 SOLUTION, RECONSTITUTED, ORAL ORAL ONCE
Refills: 0 | Status: COMPLETED | OUTPATIENT
Start: 2020-10-29 | End: 2020-10-29

## 2020-10-29 RX ADMIN — Medication 4000 MILLILITER(S): at 17:53

## 2020-10-29 RX ADMIN — Medication 1 TABLET(S): at 11:39

## 2020-10-29 RX ADMIN — Medication 25 MILLIGRAM(S): at 00:23

## 2020-10-29 RX ADMIN — DULOXETINE HYDROCHLORIDE 60 MILLIGRAM(S): 30 CAPSULE, DELAYED RELEASE ORAL at 00:23

## 2020-10-29 RX ADMIN — PANTOPRAZOLE SODIUM 40 MILLIGRAM(S): 20 TABLET, DELAYED RELEASE ORAL at 05:52

## 2020-10-29 RX ADMIN — SODIUM CHLORIDE 75 MILLILITER(S): 9 INJECTION, SOLUTION INTRAVENOUS at 11:41

## 2020-10-29 RX ADMIN — PANTOPRAZOLE SODIUM 40 MILLIGRAM(S): 20 TABLET, DELAYED RELEASE ORAL at 17:54

## 2020-10-29 NOTE — PROGRESS NOTE ADULT - SUBJECTIVE AND OBJECTIVE BOX
PILI SMALL 60y Female  MRN#: [id number]     SUBJECTIVE  Patient is a 60y old Female who presents with a chief complaint of Microcytic Anemia (28 Oct 2020 19:19)  Currently admitted to medicine with the primary diagnosis of Syncope  Today is hospital day 1d, and this morning she is well. No issues overnight  No melena, no hematochezia    OBJECTIVE  PAST MEDICAL & SURGICAL HISTORY  Sciatica  with numbness/tingling and dizziness    PTSD (post-traumatic stress disorder)    Anxiety    Obesity    Depression  no suicide ideation    Fibromyalgia    OA (osteoarthritis)    Rheumatoid arthritis    History of surgery  repaired left achilles tendon 6/2018    Colon polyp    History of dilatation and curettage  April 2018    S/P left knee arthroscopy  bilateral knee arthroscopy 2015    History of cholecystectomy    History of surgery  gastric bypass 2003      ALLERGIES:  No Known Allergies    MEDICATIONS:  STANDING MEDICATIONS  calcium carbonate 1250 mG  + Vitamin D (OsCal 500 + D) 1 Tablet(s) Oral daily  chlorhexidine 4% Liquid 1 Application(s) Topical <User Schedule>  DULoxetine 60 milliGRAM(s) Oral at bedtime  lactated ringers. 1000 milliLiter(s) IV Continuous <Continuous>  pantoprazole  Injectable 40 milliGRAM(s) IV Push every 12 hours    PRN MEDICATIONS  cyclobenzaprine 10 milliGRAM(s) Oral three times a day PRN      VITAL SIGNS: Last 24 Hours  T(C): 36.2 (29 Oct 2020 07:53), Max: 36.7 (28 Oct 2020 13:58)  T(F): 97.1 (29 Oct 2020 07:53), Max: 98.1 (28 Oct 2020 13:58)  HR: 76 (29 Oct 2020 07:53) (73 - 82)  BP: 119/64 (29 Oct 2020 07:53) (119/64 - 166/95)  BP(mean): --  RR: 20 (29 Oct 2020 07:53) (17 - 20)  SpO2: 100% (29 Oct 2020 07:53) (99% - 100%)    LABS:                        8.0    4.30  )-----------( 344      ( 29 Oct 2020 07:30 )             25.6     10-29    137  |  107  |  18  ----------------------------<  82  5.1<H>   |  21  |  1.3    Ca    9.0      29 Oct 2020 07:30  Mg     2.1     10-29    TPro  6.2  /  Alb  3.5  /  TBili  1.2  /  DBili  x   /  AST  28  /  ALT  10  /  AlkPhos  83  10-29    PT/INR - ( 27 Oct 2020 11:48 )   PT: 11.30 sec;   INR: 0.98 ratio         PTT - ( 27 Oct 2020 11:48 )  PTT:35.7 sec      Troponin T, Serum: <0.01 ng/mL (10-28-20 @ 15:00)      CARDIAC MARKERS ( 28 Oct 2020 15:00 )  x     / <0.01 ng/mL / x     / x     / x          RADIOLOGY:    PHYSICAL EXAM:    GENERAL: NAD, well-developed, AAOx3  HEENT:  Atraumatic, Normocephalic. EOMI, PERRLA, conjunctiva and sclera clear, No JVD  PULMONARY: Clear to auscultation bilaterally; No wheeze  CARDIOVASCULAR: Regular rate and rhythm; No murmurs, rubs, or gallops  GASTROINTESTINAL: Soft, Nontender, Nondistended; Bowel sounds present  MUSCULOSKELETAL:  2+ Peripheral Pulses, No clubbing, cyanosis, or edema  NEUROLOGY: non-focal  SKIN: No rashes or lesions        ASSESSMENT & PLAN  61 y/o female with PMH of RA on indomethacin, hx of bleeding gastric ulcer, obesity s/p gastric bypass in 2003, depression, fibromylagia, s/p surgical removal of 7 fibroids, presents to the ED with acute on chronic microcytic anemia and lightheadedness. Patient was getting pre-op testing for knee replacement when Hb was found to be 5.8 and she was sent to the ED.     # Acute on Chronic Microcytic Anemia/symptomatic anemia   Likely secondary to GI bleed (taking NSAIDS and "keto pill", hx of hematemesis following taking keto pill, s/p EGD found to have bleeding gastric ulcer, hx gastric bypass) vs. vaginal source (while patient does have hx of fibroids, she had all 7 successfully removed in past, no longer has menstrual period, has very minimal spotting once or twice around 15th of month)   - Hb 5.8 --> s/p 2prbc --> 8 now. appropriate correction  - hold indomethacin  - keep NPO. IV protonix 40 mg BID   - transfuse to keep Hb >7  - f/u GI consult. (Patient states if she needs ob/gyn consult she does not want Dr Carter. see previous chart note from Dr. Carter).     # RA  - hold indomethacin for now    # obesity s/p gastric bypass  - OP f/u    # depression/fibromyalgia  - continue home meds    DVT ppx: SCD  GI ppx: IV protonix for now  increase as tolerated  Dispo: acute, from home  FULL CODE

## 2020-10-29 NOTE — CONSULT NOTE ADULT - SUBJECTIVE AND OBJECTIVE BOX
Gastroenterology Consultation:    Patient is a 60y old  Female who presents with a chief complaint of Microcytic Anemia (29 Oct 2020 10:21)      Admitted on: 10-28-20  HPI:  61 y/o female with PMH of RA on indomethacin, hx of bleeding gastric ulcer, obesity s/p gastric bypass in 2003, depression, fibromylagia, s/p surgical removal of 7 fibroids, presents to the ED with acute on chronic microcytic anemia and lightheadedness. Patient was getting pre-op testing for knee replacement when Hb was found to be 5.8 and she was sent to the ED. Patient says she has a hx of anemia but has never been transfused before. Patient is aubrey-menopausal, has a scant amount of vaginal spotting every month, no VB around the 15th.  Denies any rectal bleeding or dark stool. Had colonoscopy in 2018 and had polyp removed. After doing a keto diet she had an episode of hematemesis in 2018 and underwent EGD and found to have bleeding gastric ulcer. She also had syncopal episode after walking up 5 flights of stairs in her house 3 days ago. Did not hit head. Associated with increasing fatigue. No children. Denies headache, chest pain, palpitations, SOB, abdominal pain, N/V/D/constipation, urinary symptoms, or lower extremity edema. Patient denies melena, fresh blood per rectum or hematemesis.   In ED, /79, HR 82. temp 98.1. Sating 99% on RA. Hb 5.8 (MCV 74). Plt 343. s/p 2 units PRBC in ED. To be admitted to medicine for further workup and management.  (28 Oct 2020 19:19)      Prior records Reviewed (Y/N): Y  History obtained from person other than patient (Y/N): N    Prior EGD: 2 years ago at [facility] , not on records   Prior Colonoscopy: 3 years with dr felder , history of colon polyps , removed endoscopically       PAST MEDICAL & SURGICAL HISTORY:  Sciatica  with numbness/tingling and dizziness    PTSD (post-traumatic stress disorder)    Anxiety    Obesity    Depression  no suicide ideation    Fibromyalgia    OA (osteoarthritis)    Rheumatoid arthritis    History of surgery  repaired left achilles tendon 6/2018    Colon polyp    History of dilatation and curettage  April 2018    S/P left knee arthroscopy  bilateral knee arthroscopy 2015    History of cholecystectomy    History of surgery  gastric bypass 2003        FAMILY HISTORY:  FHx: arthritis  Father    FHx: diabetes mellitus  Mother    FHx: hypertension  Mother    FHx: colon cancer  Mother        Social History:  Alcohol: denies   Drugs: denies     Home Medications:  Calcium 600+D 600 mg-200 intl units oral tablet: orally 2 times a day (28 Oct 2020 19:27)  cyclobenzaprine 30 mg oral capsule, extended release: 1 cap(s) orally once a day (28 Oct 2020 19:27)  indomethacin: orally once a day 1 tab (28 Oct 2020 19:27)  Protonix 40 mg oral delayed release tablet: 1 tab(s) orally once a day (28 Oct 2020 19:27)  Xeljanz XR 11 mg oral tablet, extended release: 1 tab(s) orally once a day (28 Oct 2020 19:27)    MEDICATIONS  (STANDING):  calcium carbonate 1250 mG  + Vitamin D (OsCal 500 + D) 1 Tablet(s) Oral daily  chlorhexidine 4% Liquid 1 Application(s) Topical <User Schedule>  DULoxetine 60 milliGRAM(s) Oral at bedtime  lactated ringers. 1000 milliLiter(s) (75 mL/Hr) IV Continuous <Continuous>  pantoprazole  Injectable 40 milliGRAM(s) IV Push every 12 hours    MEDICATIONS  (PRN):  cyclobenzaprine 10 milliGRAM(s) Oral three times a day PRN Muscle Spasm      Allergies  No Known Allergies      Review of Systems:   Constitutional:  No Fever, No Chills  ENT/Mouth:  No Hearing Changes,  No Difficulty Swallowing  Eyes:  No Eye Pain, No Vision Changes  Cardiovascular:  No Chest Pain now , No Palpitations now  Respiratory:  No Cough, No Dyspnea  Gastrointestinal:  As described in HPI  Musculoskeletal:  No Joint Swelling now , No Back Pain  Skin:  No Skin Lesions, No Jaundice  Neuro:  No Syncope, No Dizziness  Heme/Lymph:  No Bruising, No Bleeding.          Physical Examination:  T(C): 36.2 (10-29-20 @ 07:53), Max: 36.7 (10-28-20 @ 13:58)  HR: 76 (10-29-20 @ 07:53) (73 - 82)  BP: 119/64 (10-29-20 @ 07:53) (119/64 - 166/95)  RR: 20 (10-29-20 @ 07:53) (17 - 20)  SpO2: 100% (10-29-20 @ 07:53) (99% - 100%)  Height (cm): 167.6 (10-28-20 @ 13:58)      Constitutional: No acute distress.  Eyes:. Conjunctivae are clear, Sclera is non-icteric.  Ears Nose and Throat: The external ears are normal appearing,  Oral mucosa is pink and moist.  Respiratory:  No signs of respiratory distress. Lung sounds are clear bilaterally.  Cardiovascular:  S1 S2, Regular rate and rhythm.  GI: Abdomen is soft, symmetric, and non-tender without distention.  Bowel sounds are present and normoactive in all four quadrants. No masses, hepatomegaly, or splenomegaly are noted.   Neuro: No Tremor, No involuntary movements  Skin: No rashes, No Jaundice.          Data: (reviewed by attending)                        8.0    4.30  )-----------( 344      ( 29 Oct 2020 07:30 )             25.6     Hgb Trend:  8.0  10-29-20 @ 07:30  7.9  10-29-20 @ 00:00  5.8  10-28-20 @ 15:00  5.8  10-27-20 @ 11:48      10-29    137  |  107  |  18  ----------------------------<  82  5.1<H>   |  21  |  1.3    Ca    9.0      29 Oct 2020 07:30  Mg     2.1     10-29    TPro  6.2  /  Alb  3.5  /  TBili  1.2  /  DBili  x   /  AST  28  /  ALT  10  /  AlkPhos  83  10-29    Liver panel trend:  TBili 1.2   /   AST 28   /   ALT 10   /   AlkP 83   /   Tptn 6.2   /   Alb 3.5    /   DBili --      10-29  TBili 0.3   /   AST 22   /   ALT 8   /   AlkP 86   /   Tptn 6.8   /   Alb 3.9    /   DBili --      10-28  TBili 0.2   /   AST 20   /   ALT 8   /   AlkP 87   /   Tptn 6.8   /   Alb 3.9    /   DBili --      10-27              Radiology:(reviewed by attending)

## 2020-10-29 NOTE — CONSULT NOTE ADULT - ASSESSMENT
59 y/o female with PMH of RA on indomethacin, hx of bleeding gastric ulcer, obesity s/p gastric bypass in 2003, depression, fibromyalgia s/p surgical removal of 7 fibroids, presents to the ED with acute on chronic microcytic anemia and lightheadedness 61 y/o female with PMH of RA on indomethacin, hx of bleeding gastric ulcer, obesity s/p gastric bypass in 2003, depression, fibromyalgia s/p surgical removal of 7 fibroids, presents to the ED with acute on chronic microcytic anemia and lightheadedness. Patient also reported history of weight loss around 120 pounds in 2 years , recurrent vomiting and inability to tolerated food. Denies melena , hematemesis or fresh blood per rectum.      #acute on chronic iron deficiency anemia: no evidence of active GI bleed   corrected appropriately s/p 2 units of PRBC   #recurrent vomiting , weight loss , r/o  anastomotic ulcer site ( history of bypass, history of NSAID usage)   #history of colon polyps , ? mass s/p endoscopic resection ( no reports available )     REC:   clear liquid diet   keep HBG above   patient will benefit from EGD colonoscopy    59 y/o female with PMH of RA on indomethacin, hx of bleeding gastric ulcer, obesity s/p gastric bypass in 2003, depression, fibromyalgia s/p surgical removal of 7 fibroids, presents to the ED with acute on chronic microcytic anemia and lightheadedness. Patient also reported history of weight loss around 120 pounds in 2 years , recurrent vomiting and inability to tolerated food. Denies melena , hematemesis or fresh blood per rectum.      #acute on chronic iron deficiency anemia: no evidence of active GI bleed   corrected appropriately s/p 2 units of PRBC   #recurrent vomiting , weight loss , r/o  anastomotic ulcer site ( history of bypass, history of NSAID usage)   #history of colon polyps , ? mass s/p endoscopic resection ( no reports available )     REC:   clear liquid diet   keep HBG above   patient will benefit from EGD colonoscopy  in am   keep NPO after midnight

## 2020-10-30 LAB
ANION GAP SERPL CALC-SCNC: 11 MMOL/L — SIGNIFICANT CHANGE UP (ref 7–14)
BASOPHILS # BLD AUTO: 0.02 K/UL — SIGNIFICANT CHANGE UP (ref 0–0.2)
BASOPHILS NFR BLD AUTO: 0.6 % — SIGNIFICANT CHANGE UP (ref 0–1)
BUN SERPL-MCNC: 10 MG/DL — SIGNIFICANT CHANGE UP (ref 10–20)
CALCIUM SERPL-MCNC: 9.1 MG/DL — SIGNIFICANT CHANGE UP (ref 8.5–10.1)
CHLORIDE SERPL-SCNC: 105 MMOL/L — SIGNIFICANT CHANGE UP (ref 98–110)
CO2 SERPL-SCNC: 24 MMOL/L — SIGNIFICANT CHANGE UP (ref 17–32)
CREAT SERPL-MCNC: 1.1 MG/DL — SIGNIFICANT CHANGE UP (ref 0.7–1.5)
EOSINOPHIL # BLD AUTO: 0.09 K/UL — SIGNIFICANT CHANGE UP (ref 0–0.7)
EOSINOPHIL NFR BLD AUTO: 2.7 % — SIGNIFICANT CHANGE UP (ref 0–8)
GLUCOSE SERPL-MCNC: 83 MG/DL — SIGNIFICANT CHANGE UP (ref 70–99)
HCT VFR BLD CALC: 26.9 % — LOW (ref 37–47)
HGB BLD-MCNC: 8.4 G/DL — LOW (ref 12–16)
IMM GRANULOCYTES NFR BLD AUTO: 0.3 % — SIGNIFICANT CHANGE UP (ref 0.1–0.3)
LYMPHOCYTES # BLD AUTO: 0.69 K/UL — LOW (ref 1.2–3.4)
LYMPHOCYTES # BLD AUTO: 20.7 % — SIGNIFICANT CHANGE UP (ref 20.5–51.1)
MCHC RBC-ENTMCNC: 23.8 PG — LOW (ref 27–31)
MCHC RBC-ENTMCNC: 31.2 G/DL — LOW (ref 32–37)
MCV RBC AUTO: 76.2 FL — LOW (ref 81–99)
MONOCYTES # BLD AUTO: 0.45 K/UL — SIGNIFICANT CHANGE UP (ref 0.1–0.6)
MONOCYTES NFR BLD AUTO: 13.5 % — HIGH (ref 1.7–9.3)
NEUTROPHILS # BLD AUTO: 2.07 K/UL — SIGNIFICANT CHANGE UP (ref 1.4–6.5)
NEUTROPHILS NFR BLD AUTO: 62.2 % — SIGNIFICANT CHANGE UP (ref 42.2–75.2)
NRBC # BLD: 0 /100 WBCS — SIGNIFICANT CHANGE UP (ref 0–0)
PLATELET # BLD AUTO: 331 K/UL — SIGNIFICANT CHANGE UP (ref 130–400)
POTASSIUM SERPL-MCNC: 4.4 MMOL/L — SIGNIFICANT CHANGE UP (ref 3.5–5)
POTASSIUM SERPL-SCNC: 4.4 MMOL/L — SIGNIFICANT CHANGE UP (ref 3.5–5)
RBC # BLD: 3.53 M/UL — LOW (ref 4.2–5.4)
RBC # FLD: 19.3 % — HIGH (ref 11.5–14.5)
SODIUM SERPL-SCNC: 140 MMOL/L — SIGNIFICANT CHANGE UP (ref 135–146)
WBC # BLD: 3.33 K/UL — LOW (ref 4.8–10.8)
WBC # FLD AUTO: 3.33 K/UL — LOW (ref 4.8–10.8)

## 2020-10-30 PROCEDURE — 99233 SBSQ HOSP IP/OBS HIGH 50: CPT

## 2020-10-30 PROCEDURE — 99232 SBSQ HOSP IP/OBS MODERATE 35: CPT | Mod: GC

## 2020-10-30 RX ORDER — DIPHENHYDRAMINE HCL 50 MG
25 CAPSULE ORAL AT BEDTIME
Refills: 0 | Status: DISCONTINUED | OUTPATIENT
Start: 2020-10-30 | End: 2020-10-31

## 2020-10-30 RX ADMIN — Medication 25 MILLIGRAM(S): at 21:58

## 2020-10-30 RX ADMIN — Medication 1 TABLET(S): at 12:29

## 2020-10-30 RX ADMIN — Medication 20 MILLIGRAM(S): at 01:30

## 2020-10-30 RX ADMIN — PANTOPRAZOLE SODIUM 40 MILLIGRAM(S): 20 TABLET, DELAYED RELEASE ORAL at 06:45

## 2020-10-30 RX ADMIN — DULOXETINE HYDROCHLORIDE 60 MILLIGRAM(S): 30 CAPSULE, DELAYED RELEASE ORAL at 03:30

## 2020-10-30 RX ADMIN — CYCLOBENZAPRINE HYDROCHLORIDE 10 MILLIGRAM(S): 10 TABLET, FILM COATED ORAL at 21:58

## 2020-10-30 RX ADMIN — PANTOPRAZOLE SODIUM 40 MILLIGRAM(S): 20 TABLET, DELAYED RELEASE ORAL at 21:27

## 2020-10-30 RX ADMIN — DULOXETINE HYDROCHLORIDE 60 MILLIGRAM(S): 30 CAPSULE, DELAYED RELEASE ORAL at 21:28

## 2020-10-30 NOTE — PROGRESS NOTE ADULT - ATTENDING COMMENTS
Patient seen and examined independently. Agree with resident note/ history / physical exam and plan of care with following exceptions/additions/updates. Case discussed with patient/pt decision maker, house-staff and nursing.   pt is feeling better. no pain, no sob, no palpitation                          8.0    4.30  )-----------( 344      ( 29 Oct 2020 07:30 )             25.6     hx of pud ,   awaiting GI , will probably need EGD.
I personally interviewed and examined the patient Patient EGD/Colonoscopy cancelled by Anaesthesiology because of concern for cervical subluxation. Will need EGD/Col as outpatient after appropriate clearance.
Patient seen and examined independently. Agree with resident note/ history / physical exam and plan of care with following exceptions/additions/updates. Case discussed with patient/pt decision maker, house-staff and nursing.     endoscopy today   will follow cbc  full code

## 2020-10-30 NOTE — PROGRESS NOTE ADULT - ASSESSMENT
59 y/o female with PMH of RA on indomethacin, hx of bleeding gastric ulcer, obesity s/p gastric bypass in 2003, depression, fibromyalgia s/p surgical removal of 7 fibroids, presents to the ED with acute on chronic microcytic anemia and lightheadedness. Patient also reported history of weight loss around 120 pounds in 2 years , recurrent vomiting and inability to tolerated food. Denies melena , hematemesis or fresh blood per rectum.      #acute on chronic iron deficiency anemia: no evidence of active GI bleed   corrected appropriately s/p 2 units of PRBC   #recurrent vomiting , weight loss , r/o  anastomotic ulcer site ( history of bypass, history of NSAID usage)   #history of colon polyps , ? mass s/p endoscopic resection ( no reports available )     REC:   out patient EGD/ colonoscopy  resume regular diet   Protonix 40 mg PO QD  patient needs clearance from rheumatology before any further procedure  patient to follow up with GI as outpatient in one week  after discharge

## 2020-10-30 NOTE — CHART NOTE - NSCHARTNOTEFT_GEN_A_CORE
1.  Patient's endoscopy was cancelled because of reported neck pain and numbness of bilateral fingers with cervical X-ray showing instability.    Patient reports mild numbness and tingling localized only to the tips of all fingers of both hands, less significant in the 5th digits, as well as feeling that the hands are cold. No numbness proximal fingers, palms, or dorsal hands. No shooting pains or electric shock sensation down arms, does endorse neck pain but localized to neck. Physical exam showing 5/5 strength B/l upper extremities, 4/5 strength hand intrinsic muscles; minimal decreased sensation to light touch right arm diffusely.    The cervical flex-ext X-rays show unchanged mild dynamic instability of C4-C5 (since last image of 2018) however no atlantoaxial instability.    As per GI - rheumatology clearance before procedure outpatient.    2.  Additionally patient requested vitamin B12 injection as she normally receives regular injections outpatient and stopped during pandemic. Will order serum B12 level for morning. 1.  Patient's endoscopy was cancelled because of reported neck pain and numbness of bilateral fingers with cervical X-ray showing instability.    Patient reports mild numbness and tingling localized only to the tips of all fingers of both hands, less significant in the 5th digits, as well as feeling that the hands are cold. No numbness proximal fingers, palms, or dorsal hands. No shooting pains or electric shock sensation down arms, does endorse neck pain localized to neck. Physical exam showing 5/5 strength B/l upper extremities, 4/5 strength hand intrinsic muscles; minimal decreased sensation to light touch right arm diffusely.    The cervical flexion-extension X-rays show unchanged mild dynamic instability of C4-C5 (since last image of 2018) however no atlantoaxial instability.  As per GI - rheumatology clearance before procedure outpatient.    2.  Additionally patient requested vitamin B12 injection as she normally receives regular injections outpatient related to Hx of gastric bypass and stopped during pandemic. Will order serum B12 level for morning.

## 2020-10-30 NOTE — PROGRESS NOTE ADULT - SUBJECTIVE AND OBJECTIVE BOX
Patient Information:  PILI SMALL / 60y / Female / MRN#:[id number]    Hospital Day: 2d    Interval History:  Patient seen and examined at bedside. No acute events overnight.    Past Medical History:  Sciatica    PTSD (post-traumatic stress disorder)    Anxiety    Obesity    Depression    Fibromyalgia    OA (osteoarthritis)    Rheumatoid arthritis      Past Surgical History:  History of surgery    History of surgery    Colon polyp    History of dilatation and curettage    S/P left knee arthroscopy    History of cholecystectomy    History of surgery      Allergies:  No Known Allergies    Medications:  PRN:  cyclobenzaprine 10 milliGRAM(s) Oral three times a day PRN Muscle Spasm    Standing:  calcium carbonate 1250 mG  + Vitamin D (OsCal 500 + D) 1 Tablet(s) Oral daily  chlorhexidine 4% Liquid 1 Application(s) Topical <User Schedule>  DULoxetine 60 milliGRAM(s) Oral at bedtime  lactated ringers. 1000 milliLiter(s) (75 mL/Hr) IV Continuous <Continuous>  pantoprazole  Injectable 40 milliGRAM(s) IV Push every 12 hours    Home:  Calcium 600+D 600 mg-200 intl units oral tablet: orally 2 times a day  cyclobenzaprine 30 mg oral capsule, extended release: 1 cap(s) orally once a day  indomethacin: orally once a day 1 tab  Protonix 40 mg oral delayed release tablet: 1 tab(s) orally once a day  Xeljanz XR 11 mg oral tablet, extended release: 1 tab(s) orally once a day    Vitals:  T(C): 36.6, Max: 36.8 (10-29-20 @ 13:49)  T(F): 97.8, Max: 98.2 (10-29-20 @ 13:49)  HR: 66 (66 - 73)  BP: 149/85 (133/82 - 162/89)  RR: 18 (18 - 19)  SpO2: 100% (100% - 100%)    Physical Exam:  General: Awake, alert, NAD, resting comfortably in bed, on RA  HEENT: Head NC/AT  Heart: RRR; S1/S2; no rubs, murmurs appreciated  Lungs: Clear to auscultation bilaterally, no wheezing, rales or rhonchi  Abdomen: Soft, nontender, nondistended, BS+  Extremities: No edema, clubbing, cyanosis in upper or lower extremities.  Neuro: AAOx3, NFD.    Labs:  CBC (10-29 @ 07:30)                        Hgb: 8.0    WBC: 4.30  )-----------------( Plts: 344                              Hct: 25.6     Chem (10-29 @ 07:30)  Na: 137  |     Cl: 107     |  BUN: 18  -----------------------------------------< Gluc: 82    K: 5.1   |    HCO3: 21  |  Cr: 1.3    Ca 9.0 (10-29 @ 07:30)  Mg 2.1 (10-29 @ 07:30)    LFTs (10-29 @ 07:30)  TPro 6.2  /  Alb 3.5  TBili 1.2  /  DBili     AST 28  /  ALT 10  /  AlkPhos 83    Cardiac Markers (10-28 @ 15:00)  Troponin I X  Troponin T <0.01  CK X  CKMB X  CKMB Units X  Myoglobin X  Lactate X  ESR X            Microbiology:    Radiology:

## 2020-10-30 NOTE — PROGRESS NOTE ADULT - ASSESSMENT
61 y/o female with PMH of RA on indomethacin, hx of bleeding gastric ulcer, obesity s/p gastric bypass in 2003, depression, fibromylagia, s/p surgical removal of 7 fibroids, presents to the ED with acute on chronic microcytic anemia and lightheadedness. Patient was getting pre-op testing for knee replacement when Hb was found to be 5.8 and she was sent to the ED.     #Acute on Chronic Microcytic Anemia/symptomatic anemia   Likely secondary to GI bleed (taking NSAIDS and "keto pill", hx of hematemesis following taking keto pill, s/p EGD found to have bleeding gastric ulcer, hx gastric bypass) vs. vaginal source (while patient does have hx of fibroids, she had all 7 successfully removed in past, no longer has menstrual period, has very minimal spotting once or twice around 15th of month).  - Hb 5.8 --> s/p 2prbc --> 8 now. Appropriate correction.  - EGD and colonoscopy today.  - Indomethacin held.  - IV protonix 40 mg BID.  - transfuse to keep Hb >7.    #RA  - hold indomethacin for now    #Hx of bariatric gastric bypass   - OP f/u    #depression/fibromyalgia  - continue home meds    DVT ppx: SCD  GI ppx: IV protonix for now  Dispo: pending EGD/colonoscopy  FULL CODE

## 2020-10-30 NOTE — CHART NOTE - NSCHARTNOTEFT_GEN_A_CORE
PACU ANESTHESIA ADMISSION NOTE      Procedure: CANCELLED.   Post op diagnosis:      ____  Intubated  TV:______       Rate: ______      FiO2: ______    ____  Patent Airway    ____  Full return of protective reflexes    ____  Full recovery from anesthesia / back to baseline     Vitals:   T:           R:                  BP:                  Sat:                   P:       Mental Status:  ____ Awake   _____ Alert   _____ Drowsy   _____ Sedated    Nausea/Vomiting:  ____ NO  ______Yes,   See Post - Op Orders          Pain Scale (0-10):  _____    Treatment: ____ None    ____ See Post - Op/PCA Orders    Post - Operative Fluids:   ____ Oral   ____ See Post - Op Orders    Plan: Discharge:   ____Home       _____Floor     _____Critical Care    _____  Other:_________________    Comments:    Patient is here for a colonoscopy under general anesthesia for anemia. PMH of RA and c/o neck pain and numbness of bilateral fingers. I placed calls between 3pm and 5pm to Jennyfer Paul, Jamie Rolle, Terry and Kushal in search of clearance. Her Cervical XRays are significant for instability.  I need further eval before elective procedure under GA.

## 2020-10-30 NOTE — PROVIDER CONTACT NOTE (OTHER) - SITUATION
MD notified pt is coming back from endo with c/o numbness in fingers, MD verbalized understanding and is going to come and assess pt, will continue to monitor

## 2020-10-30 NOTE — PRE-OP CHECKLIST - NS PREOP CHK CHLOROHEX WASH
Administered 0.5 mL of influenza immunizations in left deltoid. Patient tolerated well with no signs of a reaction. Patient was given VIS. N/A

## 2020-10-30 NOTE — PROGRESS NOTE ADULT - SUBJECTIVE AND OBJECTIVE BOX
Gastroenterology progress note:     Patient is a 60y old  Female who presents with a chief complaint of Microcytic Anemia (30 Oct 2020 12:33)       Admitted on: 10-28-20    We are following the patient for: anemia      Interval History: no melena , no hematemesis , no hematochezia . EGD / colonoscopy was cancelled by anesthesia for concern of atlantoaxial joint instability given history of rheumatoid arthritis         PAST MEDICAL & SURGICAL HISTORY:  Sciatica  with numbness/tingling and dizziness    PTSD (post-traumatic stress disorder)    Anxiety    Obesity    Depression  no suicide ideation    Fibromyalgia    OA (osteoarthritis)    Rheumatoid arthritis    History of surgery  repaired left achilles tendon 6/2018    Colon polyp    History of dilatation and curettage  April 2018    S/P left knee arthroscopy  bilateral knee arthroscopy 2015    History of cholecystectomy    History of surgery  gastric bypass 2003        MEDICATIONS  (STANDING):  calcium carbonate 1250 mG  + Vitamin D (OsCal 500 + D) 1 Tablet(s) Oral daily  chlorhexidine 4% Liquid 1 Application(s) Topical <User Schedule>  DULoxetine 60 milliGRAM(s) Oral at bedtime  lactated ringers. 1000 milliLiter(s) (75 mL/Hr) IV Continuous <Continuous>  pantoprazole  Injectable 40 milliGRAM(s) IV Push every 12 hours    MEDICATIONS  (PRN):  cyclobenzaprine 10 milliGRAM(s) Oral three times a day PRN Muscle Spasm      Allergies  No Known Allergies      Review of Systems:   Cardiovascular:  No Chest Pain, No Palpitations  Respiratory:  No Cough, No Dyspnea  Gastrointestinal:  As described in HPI    Physical Examination:  T(C): 36.9 (10-30-20 @ 14:10), Max: 36.9 (10-30-20 @ 11:01)  HR: 75 (10-30-20 @ 14:10) (66 - 75)  BP: 164/96 (10-30-20 @ 14:10) (133/82 - 164/96)  RR: 18 (10-30-20 @ 14:10) (16 - 19)  SpO2: --  Weight (kg): 75.3 (10-30-20 @ 14:20)    10-29-20 @ 07:01  -  10-30-20 @ 07:00  --------------------------------------------------------  IN: 300 mL / OUT: 0 mL / NET: 300 mL      Constitutional: No acute distress.  Respiratory:  No signs of respiratory distress. Lung sounds are clear bilaterally.  Cardiovascular:  S1 S2, Regular rate and rhythm.  Abdominal: Abdomen is soft, symmetric, and non-tender without distention. There are no visible lesions or scars. Bowel sounds are present and normoactive in all four quadrants. No masses, hepatomegaly, or splenomegaly are noted.   Skin: No rashes, No Jaundice.        Data: (reviewed by attending)                        8.4    3.33  )-----------( 331      ( 30 Oct 2020 11:27 )             26.9     Hgb trend:  8.4  10-30-20 @ 11:27  8.0  10-29-20 @ 07:30  7.9  10-29-20 @ 00:00  5.8  10-28-20 @ 15:00        10-30    140  |  105  |  10  ----------------------------<  83  4.4   |  24  |  1.1    Ca    9.1      30 Oct 2020 11:27  Mg     2.1     10-29    TPro  6.2  /  Alb  3.5  /  TBili  1.2  /  DBili  x   /  AST  28  /  ALT  10  /  AlkPhos  83  10-29    Liver panel trend:  TBili 1.2   /   AST 28   /   ALT 10   /   AlkP 83   /   Tptn 6.2   /   Alb 3.5    /   DBili --      10-29  TBili 0.3   /   AST 22   /   ALT 8   /   AlkP 86   /   Tptn 6.8   /   Alb 3.9    /   DBili --      10-28  TBili 0.2   /   AST 20   /   ALT 8   /   AlkP 87   /   Tptn 6.8   /   Alb 3.9    /   DBili --      10-27             Radiology: (reviewed by attending)

## 2020-10-31 ENCOUNTER — TRANSCRIPTION ENCOUNTER (OUTPATIENT)
Age: 60
End: 2020-10-31

## 2020-10-31 VITALS
HEART RATE: 84 BPM | DIASTOLIC BLOOD PRESSURE: 81 MMHG | TEMPERATURE: 97 F | RESPIRATION RATE: 17 BRPM | SYSTOLIC BLOOD PRESSURE: 144 MMHG

## 2020-10-31 LAB
ANION GAP SERPL CALC-SCNC: 10 MMOL/L — SIGNIFICANT CHANGE UP (ref 7–14)
BASOPHILS # BLD AUTO: 0.03 K/UL — SIGNIFICANT CHANGE UP (ref 0–0.2)
BASOPHILS NFR BLD AUTO: 0.8 % — SIGNIFICANT CHANGE UP (ref 0–1)
BUN SERPL-MCNC: 10 MG/DL — SIGNIFICANT CHANGE UP (ref 10–20)
CALCIUM SERPL-MCNC: 9.3 MG/DL — SIGNIFICANT CHANGE UP (ref 8.5–10.1)
CHLORIDE SERPL-SCNC: 107 MMOL/L — SIGNIFICANT CHANGE UP (ref 98–110)
CO2 SERPL-SCNC: 24 MMOL/L — SIGNIFICANT CHANGE UP (ref 17–32)
CREAT SERPL-MCNC: 1.1 MG/DL — SIGNIFICANT CHANGE UP (ref 0.7–1.5)
EOSINOPHIL # BLD AUTO: 0.1 K/UL — SIGNIFICANT CHANGE UP (ref 0–0.7)
EOSINOPHIL NFR BLD AUTO: 2.5 % — SIGNIFICANT CHANGE UP (ref 0–8)
GLUCOSE SERPL-MCNC: 81 MG/DL — SIGNIFICANT CHANGE UP (ref 70–99)
HCT VFR BLD CALC: 26.1 % — LOW (ref 37–47)
HGB BLD-MCNC: 8.2 G/DL — LOW (ref 12–16)
IMM GRANULOCYTES NFR BLD AUTO: 0.3 % — SIGNIFICANT CHANGE UP (ref 0.1–0.3)
LYMPHOCYTES # BLD AUTO: 0.9 K/UL — LOW (ref 1.2–3.4)
LYMPHOCYTES # BLD AUTO: 22.6 % — SIGNIFICANT CHANGE UP (ref 20.5–51.1)
MAGNESIUM SERPL-MCNC: 1.6 MG/DL — LOW (ref 1.8–2.4)
MCHC RBC-ENTMCNC: 24 PG — LOW (ref 27–31)
MCHC RBC-ENTMCNC: 31.4 G/DL — LOW (ref 32–37)
MCV RBC AUTO: 76.5 FL — LOW (ref 81–99)
MONOCYTES # BLD AUTO: 0.54 K/UL — SIGNIFICANT CHANGE UP (ref 0.1–0.6)
MONOCYTES NFR BLD AUTO: 13.6 % — HIGH (ref 1.7–9.3)
NEUTROPHILS # BLD AUTO: 2.4 K/UL — SIGNIFICANT CHANGE UP (ref 1.4–6.5)
NEUTROPHILS NFR BLD AUTO: 60.2 % — SIGNIFICANT CHANGE UP (ref 42.2–75.2)
NRBC # BLD: 0 /100 WBCS — SIGNIFICANT CHANGE UP (ref 0–0)
PLATELET # BLD AUTO: 321 K/UL — SIGNIFICANT CHANGE UP (ref 130–400)
POTASSIUM SERPL-MCNC: 4.6 MMOL/L — SIGNIFICANT CHANGE UP (ref 3.5–5)
POTASSIUM SERPL-SCNC: 4.6 MMOL/L — SIGNIFICANT CHANGE UP (ref 3.5–5)
RBC # BLD: 3.41 M/UL — LOW (ref 4.2–5.4)
RBC # FLD: 19.6 % — HIGH (ref 11.5–14.5)
SODIUM SERPL-SCNC: 141 MMOL/L — SIGNIFICANT CHANGE UP (ref 135–146)
VIT B12 SERPL-MCNC: 1483 PG/ML — HIGH (ref 232–1245)
WBC # BLD: 3.98 K/UL — LOW (ref 4.8–10.8)
WBC # FLD AUTO: 3.98 K/UL — LOW (ref 4.8–10.8)

## 2020-10-31 PROCEDURE — 99239 HOSP IP/OBS DSCHRG MGMT >30: CPT

## 2020-10-31 RX ORDER — FERROUS SULFATE 325(65) MG
1 TABLET ORAL
Qty: 30 | Refills: 0
Start: 2020-10-31 | End: 2020-11-29

## 2020-10-31 RX ORDER — MAGNESIUM SULFATE 500 MG/ML
2 VIAL (ML) INJECTION ONCE
Refills: 0 | Status: DISCONTINUED | OUTPATIENT
Start: 2020-10-31 | End: 2020-10-31

## 2020-10-31 RX ORDER — IRON SUCROSE 20 MG/ML
200 INJECTION, SOLUTION INTRAVENOUS ONCE
Refills: 0 | Status: COMPLETED | OUTPATIENT
Start: 2020-10-31 | End: 2020-10-31

## 2020-10-31 RX ORDER — PANTOPRAZOLE SODIUM 20 MG/1
1 TABLET, DELAYED RELEASE ORAL
Qty: 0 | Refills: 0 | DISCHARGE

## 2020-10-31 RX ORDER — PANTOPRAZOLE SODIUM 20 MG/1
1 TABLET, DELAYED RELEASE ORAL
Qty: 60 | Refills: 0
Start: 2020-10-31 | End: 2020-11-29

## 2020-10-31 RX ORDER — INDOMETHACIN 50 MG
0 CAPSULE ORAL
Qty: 0 | Refills: 0 | DISCHARGE

## 2020-10-31 RX ORDER — MAGNESIUM SULFATE 500 MG/ML
2 VIAL (ML) INJECTION ONCE
Refills: 0 | Status: COMPLETED | OUTPATIENT
Start: 2020-10-31 | End: 2020-10-31

## 2020-10-31 RX ADMIN — IRON SUCROSE 110 MILLIGRAM(S): 20 INJECTION, SOLUTION INTRAVENOUS at 11:14

## 2020-10-31 RX ADMIN — Medication 1 TABLET(S): at 12:40

## 2020-10-31 RX ADMIN — Medication 25 GRAM(S): at 12:40

## 2020-10-31 RX ADMIN — PANTOPRAZOLE SODIUM 40 MILLIGRAM(S): 20 TABLET, DELAYED RELEASE ORAL at 06:05

## 2020-10-31 NOTE — DISCHARGE NOTE PROVIDER - CARE PROVIDER_API CALL
JERSON TRISTAN  Otolaryngology  [address]  [place] [address]  Phone: [phone]  Fax: [phone]  Follow Up Time:     Javier Delaney)  Gastroenterology; Internal Medicine  [address] [place] [address]  Phone: [phone]  Fax: [phone]  Follow Up Time:     Chan, Yeoman K (MD)  Family Medicine  [address] [place] [address], [address]  [place] [address]  Phone: [phone]  Fax: [phone]  Follow Up Time:    Javier Delaney)  Gastroenterology; Internal Medicine  [address] [place] [address]  Phone: [phone]  Fax: [phone]  Follow Up Time:     Chan, Yeoman K (MD)  Family Medicine  [address] [place] [address], Floor [address]  [place] [address]  Phone: [phone]  Fax: [phone]  Follow Up Time:     Freeman Stewart  Internal Medicine  [address] [place] [address], [place] [address]  Phone: [phone]  Fax: [phone]  Follow Up Time:

## 2020-10-31 NOTE — DISCHARGE NOTE PROVIDER - CARE PROVIDERS DIRECT ADDRESSES
,DirectAddress_Unknown,[email or web address],DirectAddress_Unknown ,[email or web address],DirectAddress_Unknown,DirectAddress_Unknown

## 2020-10-31 NOTE — DISCHARGE NOTE PROVIDER - HOSPITAL COURSE
60 year-old woman with history of RA on indomethacin and Xeljanz, bleeding gastric ulcer, gastric bypass (2003), depression, fibromyalgia, removal of 7 fibroids, who presented with acute on chronic microcytic anemia with Hgb measured at 5.8 during preop testing for a knee replacement procedure. Additionally reported syncope with a fall a few days ago after climbing up stairs to her residence. She received 2 units pRBCs in the ED with appropriate response and hemoglobin was subsequently stable around 8. 60 year-old woman with history of RA on indomethacin and Xeljanz, bleeding gastric ulcer, gastric bypass (2003), depression, fibromyalgia, removal of 7 fibroids, who presented with acute on chronic microcytic anemia with Hgb measured at 5.8 during preop testing for a knee replacement procedure. Additionally reported syncope with a fall a few days ago after climbing up stairs to her residence. She received 2 units pRBCs in the ED with appropriate response and hemoglobin was subsequently stable around 8. Patient was not able to have an EGD as she noted new onset of mild fingertip numbness in the setting of known stable C4-C5 instability on flexion-extension X-rays (history of RA). GI recommended rheumatoloy clearance prior to performing the EGD outpatient. Patient was medically 60 year-old woman with history of RA on indomethacin and Xeljanz, bleeding gastric ulcer, gastric bypass (2003), depression, fibromyalgia, removal of 7 fibroids, who presented with acute on chronic microcytic anemia with Hgb measured at 5.8 during preop testing for a knee replacement procedure. Additionally reported syncope with a fall a few days ago after climbing up stairs to her residence. She received 2 units pRBCs in the ED with appropriate response and hemoglobin was subsequently stable around 8. Patient was not able to have an EGD as she noted new onset of mild fingertip numbness in the setting of known stable C4-C5 instability on flexion-extension X-rays (history of RA). GI recommended rheumatoloy clearance prior to performing the EGD outpatient.     Patient was given one dose of Venofer and started on Protonix oral BID. Patient was medically stable and ready for discharge with ferrous sulfate and Protonix, with close followup with rheumatology, GI for endoscopy/colonoscopy, and primary care. Patient instructed to hold indomethacin and reevaluate with rheumatologist and primary care.    Patient also inquired about stool softeners while taking iron supplements. Patient instructed to eat high fiber diet and can take OTC stool softeners if desired.

## 2020-10-31 NOTE — DISCHARGE NOTE PROVIDER - NSDCCPCAREPLAN_GEN_ALL_CORE_FT
PRINCIPAL DISCHARGE DIAGNOSIS  Diagnosis: Anemia  Assessment and Plan of Treatment: You had a low hemoglobin level in the blood found on pre-operative testing for a knee replacement. We gave you       PRINCIPAL DISCHARGE DIAGNOSIS  Diagnosis: Anemia  Assessment and Plan of Treatment: You had a low hemoglobin level in the blood found on pre-operative testing for a knee replacement. We gave you blood transfusions and monitored you for bleeding. Your hemoglobin level went back up and remained stable. We stopped giving you indomethacin given the bleeding. We asked the gastroenterologists to evaluate you and they planned to do an endoscopy to check for any bleeding ulcers given your history of indomethacin use for RA. However, you had some numbness of the fingertips, which led to a concern for high anesthesia risk given your history of instability of the neck bones.   - Please follow up with your rheumatologist Dr. Stewart soon to obtain clearance for the endoscopy, and to evaluate your pain control regimen given this acute anemia.  - Please follow up with the GI doctors soon to plan the endoscopy as an outpatient. Please call the [facility] here at [facility] at [phone] to inquire about your appointment.  - Please follow up with your primary doctor Dr. Fatima for long-term management of anemia as well as other problems such as vitamin B12 deficiency.

## 2020-10-31 NOTE — DISCHARGE NOTE PROVIDER - PROVIDER TOKENS
PROVIDER:[TOKEN:[[id number]]],PROVIDER:[TOKEN:[[id number]]],PROVIDER:[TOKEN:[[id number]]] PROVIDER:[TOKEN:[[id number]]],PROVIDER:[TOKEN:[[id number]]],PROVIDER:[TOKEN:[[id number]]]

## 2020-10-31 NOTE — DISCHARGE NOTE PROVIDER - NSDCMRMEDTOKEN_GEN_ALL_CORE_FT
Calcium 600+D 600 mg-200 intl units oral tablet: orally 2 times a day  cyclobenzaprine 30 mg oral capsule, extended release: 1 cap(s) orally once a day  indomethacin: orally once a day 1 tab  Protonix 40 mg oral delayed release tablet: 1 tab(s) orally once a day  Xeljanz XR 11 mg oral tablet, extended release: 1 tab(s) orally once a day   Calcium 600+D 600 mg-200 intl units oral tablet: orally 2 times a day  cyclobenzaprine 30 mg oral capsule, extended release: 1 cap(s) orally once a day  ferrous sulfate 325 mg (65 mg elemental iron) oral delayed release tablet: 1 tab(s) orally once a day   Protonix 40 mg oral delayed release tablet: 1 tab(s) orally 2 times a day   Xeljanz XR 11 mg oral tablet, extended release: 1 tab(s) orally once a day

## 2020-10-31 NOTE — DISCHARGE NOTE NURSING/CASE MANAGEMENT/SOCIAL WORK - PATIENT PORTAL LINK FT
You can access the FollowMyHealth Patient Portal offered by [facility] by registering at the following website: [email or web address]. By joining [facility]’s FollowMyHealth portal, you will also be able to view your health information using other applications (apps) compatible with our system.

## 2020-10-31 NOTE — CHART NOTE - NSCHARTNOTEFT_GEN_A_CORE
Patient seen and examined this morning. No acute events overnight. Feeling ready to go home. Hemoglobin stable today. S/p venofer x1, also mag sulfate x1. Instructed to f/u outpatient rheum and GI to do EGD/colon.    MEDICATIONS  (STANDING):  calcium carbonate 1250 mG  + Vitamin D (OsCal 500 + D) 1 Tablet(s) Oral daily  chlorhexidine 4% Liquid 1 Application(s) Topical <User Schedule>  diphenhydrAMINE 25 milliGRAM(s) Oral at bedtime  DULoxetine 60 milliGRAM(s) Oral at bedtime  pantoprazole  Injectable 40 milliGRAM(s) IV Push every 12 hours    MEDICATIONS  (PRN):  cyclobenzaprine 10 milliGRAM(s) Oral three times a day PRN Muscle Spasm    CONSTITUTIONAL: In no acute distress  NECK: Supple; non tender. No rigidity  CARD: Regular rate and rhythm. Normal S1, S2.  RESP: Lungs clear to auscultation bilaterally.   ABD: Abdomen soft; non-tender; non-distended; no hepatosplenomegaly.  EXT: Normal ROM. No edema.  NEUROLOGY: Non-focal.  PSYCH: Cooperative, appropriate.

## 2020-11-05 DIAGNOSIS — M79.7 FIBROMYALGIA: ICD-10-CM

## 2020-11-05 DIAGNOSIS — F32.9 MAJOR DEPRESSIVE DISORDER, SINGLE EPISODE, UNSPECIFIED: ICD-10-CM

## 2020-11-05 DIAGNOSIS — D64.9 ANEMIA, UNSPECIFIED: ICD-10-CM

## 2020-11-05 DIAGNOSIS — Z98.84 BARIATRIC SURGERY STATUS: ICD-10-CM

## 2020-11-05 DIAGNOSIS — D50.9 IRON DEFICIENCY ANEMIA, UNSPECIFIED: ICD-10-CM

## 2020-11-05 DIAGNOSIS — M06.9 RHEUMATOID ARTHRITIS, UNSPECIFIED: ICD-10-CM

## 2020-11-05 DIAGNOSIS — Z53.09 PROCEDURE AND TREATMENT NOT CARRIED OUT BECAUSE OF OTHER CONTRAINDICATION: ICD-10-CM

## 2020-11-13 ENCOUNTER — OUTPATIENT (OUTPATIENT)
Dept: OUTPATIENT SERVICES | Facility: HOSPITAL | Age: 60
LOS: 1 days | Discharge: HOME | End: 2020-11-13

## 2020-11-13 ENCOUNTER — APPOINTMENT (OUTPATIENT)
Dept: GASTROENTEROLOGY | Facility: CLINIC | Age: 60
End: 2020-11-13
Payer: MEDICARE

## 2020-11-13 VITALS
HEIGHT: 66 IN | BODY MASS INDEX: 26.68 KG/M2 | WEIGHT: 166 LBS | HEART RATE: 80 BPM | DIASTOLIC BLOOD PRESSURE: 77 MMHG | TEMPERATURE: 97.4 F | SYSTOLIC BLOOD PRESSURE: 130 MMHG

## 2020-11-13 DIAGNOSIS — Z80.0 FAMILY HISTORY OF MALIGNANT NEOPLASM OF DIGESTIVE ORGANS: ICD-10-CM

## 2020-11-13 DIAGNOSIS — M47.812 SPONDYLOSIS W/OUT MYELOPATHY OR RADICULOPATHY, CERVICAL REGION: ICD-10-CM

## 2020-11-13 DIAGNOSIS — Z98.890 OTHER SPECIFIED POSTPROCEDURAL STATES: Chronic | ICD-10-CM

## 2020-11-13 DIAGNOSIS — K63.5 POLYP OF COLON: Chronic | ICD-10-CM

## 2020-11-13 DIAGNOSIS — D50.0 IRON DEFICIENCY ANEMIA SECONDARY TO BLOOD LOSS (CHRONIC): ICD-10-CM

## 2020-11-13 DIAGNOSIS — Z86.010 PERSONAL HISTORY OF COLONIC POLYPS: ICD-10-CM

## 2020-11-13 DIAGNOSIS — M47.812 SPONDYLOSIS WITHOUT MYELOPATHY OR RADICULOPATHY, CERVICAL REGION: ICD-10-CM

## 2020-11-13 PROCEDURE — 99204 OFFICE O/P NEW MOD 45 MIN: CPT

## 2020-11-13 PROCEDURE — 99214 OFFICE O/P EST MOD 30 MIN: CPT

## 2020-11-13 NOTE — ASSESSMENT
[FreeTextEntry1] : 60  F with h/o RA on NSAIDs and Tofacitinib, gastric bypass surgery on 2003, PUD with UGIB s/p egd at 2018 in [facility] came for initial visit for microcytic anemia. \par Patient found to have hgb: 5.8 as part of pre-op test for knee replacement . She was hospitalized at [facility] and received 2 UPRBC and hgb increased to 8.2 , MCV: 76.5.Iron studies was also suggestive of FOZIA.  Patient found to have mild finger numbness and C4-C5 dynamic instability. Patient was supposed to have rheumatology clearance before any endoscopic procedure. Patient  saw her PMD and her PMD ( Dr. Fatima cleared for endoscopic procedure, fax in our system).\par Patient is supposed to see the neurosurgery. will need neurosurgery clearance for endoscopic procedure.\par

## 2020-11-13 NOTE — PHYSICAL EXAM
[General Appearance - Alert] : alert [General Appearance - In No Acute Distress] : in no acute distress [Auscultation Breath Sounds / Voice Sounds] : lungs were clear to auscultation bilaterally [Heart Rate And Rhythm] : heart rate was normal and rhythm regular [Heart Sounds] : normal S1 and S2 [Heart Sounds Gallop] : no gallops [Murmurs] : no murmurs [Heart Sounds Pericardial Friction Rub] : no pericardial rub [Bowel Sounds] : normal bowel sounds [Abdomen Soft] : soft [Abdomen Tenderness] : non-tender [] : no hepato-splenomegaly [Abdomen Mass (___ Cm)] : no abdominal mass palpated [Abnormal Walk] : normal gait [Nail Clubbing] : no clubbing  or cyanosis of the fingernails [Musculoskeletal - Swelling] : no joint swelling seen [Motor Tone] : muscle strength and tone were normal [Deep Tendon Reflexes (DTR)] : deep tendon reflexes were 2+ and symmetric [Sensation] : the sensory exam was normal to light touch and pinprick [No Focal Deficits] : no focal deficits [Oriented To Time, Place, And Person] : oriented to person, place, and time [Impaired Insight] : insight and judgment were intact [Affect] : the affect was normal

## 2020-11-13 NOTE — HISTORY OF PRESENT ILLNESS
[Heartburn] : denies heartburn [Nausea] : denies nausea [Vomiting] : denies vomiting [Diarrhea] : denies diarrhea [Constipation] : denies constipation [Yellow Skin Or Eyes (Jaundice)] : denies jaundice [Abdominal Pain] : denies abdominal pain [Abdominal Swelling] : denies abdominal swelling [Rectal Pain] : denies rectal pain [de-identified] : 60  F with h/o RA on NSAIDs and Tofacitinib, gastric bypass surgery on 2003, PUD with UGIB s/p egd at 2018 in [facility] came for initial visit for microcytic anemia. \par Patient found to have hgb: 5.8 as part of pre-op test for knee replacement . She was hospitalized at [facility] and received 2 UPRBC and hgb increased to 8.2 , MCV: 76.5.Iron studies was also suggestive of FOZIA.  Patient found to have mild finger numbness and C4-C5 dynamic instability. Patient was supposed to have rheumatology clearance before any endoscopic procedure. Patient  saw her PMD and her PMD ( Dr. Fatima cleared for endoscopic procedure, fax in our system).\par Patient stopped taking NSAIDs after being discharged but she took one today. She is on PPI BID. Denies any melena or hematochezia.\par  She also had colonoscopy on 2018 by Dr. Angela , as per patient she had one large benign polyp removed. cant remember when she was told to repeat colonoscopy because she lost follow up[ with Dr. Angela.\par

## 2020-12-04 ENCOUNTER — APPOINTMENT (OUTPATIENT)
Dept: GASTROENTEROLOGY | Facility: CLINIC | Age: 60
End: 2020-12-04

## 2020-12-11 ENCOUNTER — APPOINTMENT (OUTPATIENT)
Dept: GASTROENTEROLOGY | Facility: CLINIC | Age: 60
End: 2020-12-11

## 2020-12-11 ENCOUNTER — OUTPATIENT (OUTPATIENT)
Dept: OUTPATIENT SERVICES | Facility: HOSPITAL | Age: 60
LOS: 1 days | Discharge: HOME | End: 2020-12-11

## 2020-12-11 DIAGNOSIS — Z98.890 OTHER SPECIFIED POSTPROCEDURAL STATES: Chronic | ICD-10-CM

## 2020-12-11 DIAGNOSIS — K63.5 POLYP OF COLON: Chronic | ICD-10-CM

## 2020-12-18 DIAGNOSIS — Z02.9 ENCOUNTER FOR ADMINISTRATIVE EXAMINATIONS, UNSPECIFIED: ICD-10-CM

## 2021-03-16 ENCOUNTER — RESULT REVIEW (OUTPATIENT)
Age: 61
End: 2021-03-16

## 2021-03-16 ENCOUNTER — OUTPATIENT (OUTPATIENT)
Dept: OUTPATIENT SERVICES | Facility: HOSPITAL | Age: 61
LOS: 1 days | Discharge: HOME | End: 2021-03-16
Payer: MEDICARE

## 2021-03-16 VITALS
OXYGEN SATURATION: 100 % | RESPIRATION RATE: 18 BRPM | HEIGHT: 66 IN | HEART RATE: 73 BPM | TEMPERATURE: 97 F | SYSTOLIC BLOOD PRESSURE: 141 MMHG | DIASTOLIC BLOOD PRESSURE: 82 MMHG | WEIGHT: 167.55 LBS

## 2021-03-16 DIAGNOSIS — Z98.890 OTHER SPECIFIED POSTPROCEDURAL STATES: Chronic | ICD-10-CM

## 2021-03-16 DIAGNOSIS — Z01.818 ENCOUNTER FOR OTHER PREPROCEDURAL EXAMINATION: ICD-10-CM

## 2021-03-16 DIAGNOSIS — M17.12 UNILATERAL PRIMARY OSTEOARTHRITIS, LEFT KNEE: ICD-10-CM

## 2021-03-16 DIAGNOSIS — K63.5 POLYP OF COLON: Chronic | ICD-10-CM

## 2021-03-16 LAB
A1C WITH ESTIMATED AVERAGE GLUCOSE RESULT: 5.6 % — SIGNIFICANT CHANGE UP (ref 4–5.6)
ALBUMIN SERPL ELPH-MCNC: 4.1 G/DL — SIGNIFICANT CHANGE UP (ref 3.5–5.2)
ALP SERPL-CCNC: 96 U/L — SIGNIFICANT CHANGE UP (ref 30–115)
ALT FLD-CCNC: 22 U/L — SIGNIFICANT CHANGE UP (ref 0–41)
ANION GAP SERPL CALC-SCNC: 11 MMOL/L — SIGNIFICANT CHANGE UP (ref 7–14)
APTT BLD: 43.6 SEC — HIGH (ref 27–39.2)
AST SERPL-CCNC: 26 U/L — SIGNIFICANT CHANGE UP (ref 0–41)
BASOPHILS # BLD AUTO: 0.04 K/UL — SIGNIFICANT CHANGE UP (ref 0–0.2)
BASOPHILS NFR BLD AUTO: 1.3 % — HIGH (ref 0–1)
BILIRUB SERPL-MCNC: 0.3 MG/DL — SIGNIFICANT CHANGE UP (ref 0.2–1.2)
BLD GP AB SCN SERPL QL: SIGNIFICANT CHANGE UP
BUN SERPL-MCNC: 25 MG/DL — HIGH (ref 10–20)
CALCIUM SERPL-MCNC: 9.3 MG/DL — SIGNIFICANT CHANGE UP (ref 8.5–10.1)
CHLORIDE SERPL-SCNC: 102 MMOL/L — SIGNIFICANT CHANGE UP (ref 98–110)
CO2 SERPL-SCNC: 25 MMOL/L — SIGNIFICANT CHANGE UP (ref 17–32)
CREAT SERPL-MCNC: 1.2 MG/DL — SIGNIFICANT CHANGE UP (ref 0.7–1.5)
EOSINOPHIL # BLD AUTO: 0.11 K/UL — SIGNIFICANT CHANGE UP (ref 0–0.7)
EOSINOPHIL NFR BLD AUTO: 3.5 % — SIGNIFICANT CHANGE UP (ref 0–8)
ESTIMATED AVERAGE GLUCOSE: 114 MG/DL — SIGNIFICANT CHANGE UP (ref 68–114)
GLUCOSE SERPL-MCNC: 96 MG/DL — SIGNIFICANT CHANGE UP (ref 70–99)
HCG SERPL-ACNC: <0.6 MIU/ML — SIGNIFICANT CHANGE UP
HCT VFR BLD CALC: 34 % — LOW (ref 37–47)
HGB BLD-MCNC: 11.3 G/DL — LOW (ref 12–16)
IMM GRANULOCYTES NFR BLD AUTO: 0.3 % — SIGNIFICANT CHANGE UP (ref 0.1–0.3)
INR BLD: 0.97 RATIO — SIGNIFICANT CHANGE UP (ref 0.65–1.3)
LYMPHOCYTES # BLD AUTO: 0.8 K/UL — LOW (ref 1.2–3.4)
LYMPHOCYTES # BLD AUTO: 25.2 % — SIGNIFICANT CHANGE UP (ref 20.5–51.1)
MCHC RBC-ENTMCNC: 32.1 PG — HIGH (ref 27–31)
MCHC RBC-ENTMCNC: 33.2 G/DL — SIGNIFICANT CHANGE UP (ref 32–37)
MCV RBC AUTO: 96.6 FL — SIGNIFICANT CHANGE UP (ref 81–99)
MONOCYTES # BLD AUTO: 0.38 K/UL — SIGNIFICANT CHANGE UP (ref 0.1–0.6)
MONOCYTES NFR BLD AUTO: 12 % — HIGH (ref 1.7–9.3)
MRSA PCR RESULT.: NEGATIVE — SIGNIFICANT CHANGE UP
NEUTROPHILS # BLD AUTO: 1.83 K/UL — SIGNIFICANT CHANGE UP (ref 1.4–6.5)
NEUTROPHILS NFR BLD AUTO: 57.7 % — SIGNIFICANT CHANGE UP (ref 42.2–75.2)
NRBC # BLD: 0 /100 WBCS — SIGNIFICANT CHANGE UP (ref 0–0)
PLATELET # BLD AUTO: 260 K/UL — SIGNIFICANT CHANGE UP (ref 130–400)
POTASSIUM SERPL-MCNC: 5.4 MMOL/L — HIGH (ref 3.5–5)
POTASSIUM SERPL-SCNC: 5.4 MMOL/L — HIGH (ref 3.5–5)
PROT SERPL-MCNC: 6.8 G/DL — SIGNIFICANT CHANGE UP (ref 6–8)
PROTHROM AB SERPL-ACNC: 11.1 SEC — SIGNIFICANT CHANGE UP (ref 9.95–12.87)
RBC # BLD: 3.52 M/UL — LOW (ref 4.2–5.4)
RBC # FLD: 13 % — SIGNIFICANT CHANGE UP (ref 11.5–14.5)
SODIUM SERPL-SCNC: 138 MMOL/L — SIGNIFICANT CHANGE UP (ref 135–146)
WBC # BLD: 3.17 K/UL — LOW (ref 4.8–10.8)
WBC # FLD AUTO: 3.17 K/UL — LOW (ref 4.8–10.8)

## 2021-03-16 PROCEDURE — 72170 X-RAY EXAM OF PELVIS: CPT | Mod: 26

## 2021-03-16 PROCEDURE — 93010 ELECTROCARDIOGRAM REPORT: CPT

## 2021-03-16 PROCEDURE — 73562 X-RAY EXAM OF KNEE 3: CPT | Mod: 26,LT

## 2021-03-16 NOTE — H&P PST ADULT - HISTORY OF PRESENT ILLNESS
Hospital Course:  Discharge Date 31-Oct-2020  Admission Date 28-Oct-2020 17:48  Reason for Admission Microcytic Anemia  Hospital Course   60 year-old woman with history of RA on indomethacin and Xeljanz, bleeding  gastric ulcer, gastric bypass (2003), depression, fibromyalgia, removal of 7  fibroids, who presented with acute on chronic microcytic anemia with Hgb  measured at 5.8 during preop testing for a knee replacement procedure.  Additionally reported syncope with a fall a few days ago after climbing up  stairs to her residence. She received 2 units pRBCs in the ED with appropriate  response and hemoglobin was subsequently stable around 8. Patient was not able  to have an EGD as she noted new onset of mild fingertip numbness in the setting  of known stable C4-C5 instability on flexion-extension X-rays (history of RA).  GI recommended rheumatoloy clearance prior to performing the EGD outpatient.    Patient was given one dose of Venofer and started on Protonix oral BID. Patient  was medically stable and ready for discharge with ferrous sulfate and Protonix,  with close followup with rheumatology, GI for endoscopy/colonoscopy, and  primary care. Patient instructed to hold indomethacin and reevaluate with  rheumatologist and primary care.  pt denies any covid s/s, or tested positive in the past. Denies travel outside the [place] in the past 30 days  pt advised self quarantine till day of procedure  Anesthesia Alert  NO--Difficult Airway  NO--History of neck surgery or radiation  NO--Limited ROM of neck  NO--History of Malignant hyperthermia  NO--Personal or family history of Pseudocholinesterase deficiency  NO--Prior Anesthesia Complication  NO--Latex Allergy  NO--Loose teeth  YES--History of Rheumatoid Arthritis  NO--TING    PT DENIES ANY RASHES, ABRASION, OR OPEN WOUNDS OR CUTS  AS PER THE PT, THIS IS HIS/HER COMPLETE MEDICAL AND SURGICAL HX, INCLUDING MEDICATIONS PRESCRIBED AND OVER THE COUNTER       60 year-old woman with history of RA on indomethacin and Xeljanz, bleeding gastric ulcer, gastric bypass (2003), depression, fibromyalgia, removal of 7  fibroids, who presented to [facility] for the above procedure on due to progressively worsening left knee pain and limited ROM. Patient was scheduled for the same procedure on 10/2020 and it was cancelled due to an acute on chronic microcytic anemia with Hgb measured at 5.8 on preop testing work up. Patient was admitted to [facility] on 10/28/2020 and discharged 12/31/2021.Additionally reported syncope with a fall a few days ago after climbing up stairs to her residence. She received 2 units PRBCs in the ED with appropriate response and hemoglobin was subsequently stable around 8. Patient was not able to have an EGD as she noted new onset of mild fingertip numbness in the setting of known stable C4-C5 instability on flexion-extension X-rays (history of RA).GI recommended rheumatology clearance prior to performing the EGD outpatient.Patient was given one dose of Venofer and started on Protonix oral BID and discharged home.   Today at [facility] patient denies any c/o cp, sob, palpitaions, fever, cough or dysuria.Ex tolerance of 1 fos walks with out SOB except left knee pains. Patient walks 4 miles per day with dog.   Patient denies any covid s/s, or tested positive in the past. Denies travel outside the [place] in the past 30 days  pt advised self quarantine till day of procedure  Anesthesia Alert  NO--Difficult Airway  NO--History of neck surgery or radiation  NO--Limited ROM of neck  NO--History of Malignant hyperthermia  NO--Personal or family history of Pseudocholinesterase deficiency  NO--Prior Anesthesia Complication  NO--Latex Allergy  NO--Loose teeth  YES--History of Rheumatoid Arthritis  NO--TING  Angioedema with all skin fruits.   PT DENIES ANY RASHES, ABRASION, OR OPEN WOUNDS OR CUTS  AS PER THE PT, THIS IS HIS/HER COMPLETE MEDICAL AND SURGICAL HX, INCLUDING MEDICATIONS PRESCRIBED AND OVER THE COUNTER       60 year-old woman with history of RA on indomethacin and Xeljanz, bleeding gastric ulcer, gastric bypass (2003), depression, fibromyalgia, removal of 7  fibroids, who presented to [facility] for the above procedure on due to progressively worsening left knee pain and limited ROM. Patient was scheduled for the same procedure on 10/2020 and it was cancelled due to an acute on chronic microcytic anemia with Hgb measured at 5.8 on preop testing work up. Patient was admitted to [facility] on 10/28/2020 and discharged 12/31/2021.Additionally reported syncope with a fall a few days ago after climbing up stairs to her residence. She received 2 units PRBCs in the ED with appropriate response and hemoglobin was subsequently stable around 8. Patient was not able to have an EGD as she noted new onset of mild fingertip numbness in the setting of known stable C4-C5 instability on flexion-extension X-rays (history of RA).GI recommended rheumatology clearance prior to performing the EGD outpatient.Patient was given one dose of Venofer and started on Protonix oral BID and discharged home.   Today at [facility] patient denies any c/o cp, sob, palpitaions, fever, cough or dysuria.Ex tolerance of 1 fos walks with out SOB except left knee pains. Patient walks 4 miles per day with dog.   Patient denies any covid s/s, or tested positive in the past. Denies travel outside the [place] in the past 30 days  pt advised self quarantine till day of procedure  Anesthesia Alert  NO--Difficult Airway  NO--History of neck surgery or radiation  NO--Limited ROM of neck  NO--History of Malignant hyperthermia  NO--Personal or family history of Pseudocholinesterase deficiency  NO--Prior Anesthesia Complication  NO--Latex Allergy  NO--Loose teeth  YES--History of Rheumatoid Arthritis. Endplate erosions C6-C7. stable instability C4-C5   NO--TING  Angioedema with all skin fruits.   PT DENIES ANY RASHES, ABRASION, OR OPEN WOUNDS OR CUTS  AS PER THE PT, THIS IS HIS/HER COMPLETE MEDICAL AND SURGICAL HX, INCLUDING MEDICATIONS PRESCRIBED AND OVER THE COUNTER

## 2021-03-16 NOTE — H&P PST ADULT - MUSCULOSKELETAL
No joint pain, swelling or deformity; no limitation of movement Left knee/decreased ROM due to pain detailed exam

## 2021-03-16 NOTE — H&P PST ADULT - NSICDXPASTSURGICALHX_GEN_ALL_CORE_FT
PAST SURGICAL HISTORY:  Colon polyp     History of cholecystectomy     History of dilatation and curettage April 2018    History of ovarian cystectomy Right side by verical insion    History of surgery repaired left achilles tendon 6/2018    History of surgery gastric bypass 2003    S/P left knee arthroscopy bilateral knee arthroscopy 2015

## 2021-03-16 NOTE — H&P PST ADULT - REASON FOR ADMISSION
Erika Natarajan is a 61 yo f presents to [facility] for Left total knee replacement under [facility] by Dr. Jamie Rolle on 03/31/2021.    Covid testing on 03/28/2021 08:00 am.

## 2021-03-16 NOTE — H&P PST ADULT - NSICDXPASTMEDICALHX_GEN_ALL_CORE_FT
PAST MEDICAL HISTORY:  Anemia     Anxiety     Depression no suicide ideation    Fibromyalgia     H/O: GI bleed     History of food poisoning     OA (osteoarthritis)     Obesity     PTSD (post-traumatic stress disorder)     Rheumatoid arthritis     Sciatica with numbness/tingling and dizziness

## 2021-03-18 ENCOUNTER — OUTPATIENT (OUTPATIENT)
Dept: OUTPATIENT SERVICES | Facility: HOSPITAL | Age: 61
LOS: 1 days | Discharge: HOME | End: 2021-03-18

## 2021-03-18 DIAGNOSIS — Z98.890 OTHER SPECIFIED POSTPROCEDURAL STATES: Chronic | ICD-10-CM

## 2021-03-18 DIAGNOSIS — Z02.9 ENCOUNTER FOR ADMINISTRATIVE EXAMINATIONS, UNSPECIFIED: ICD-10-CM

## 2021-03-18 DIAGNOSIS — K63.5 POLYP OF COLON: Chronic | ICD-10-CM

## 2021-03-18 PROBLEM — D64.9 ANEMIA, UNSPECIFIED: Chronic | Status: ACTIVE | Noted: 2021-03-16

## 2021-03-18 LAB
APTT BLD: 40.4 SEC — HIGH (ref 27–39.2)
POTASSIUM SERPL-MCNC: 4.6 MMOL/L — SIGNIFICANT CHANGE UP (ref 3.5–5)
POTASSIUM SERPL-SCNC: 4.6 MMOL/L — SIGNIFICANT CHANGE UP (ref 3.5–5)

## 2021-03-28 ENCOUNTER — OUTPATIENT (OUTPATIENT)
Dept: OUTPATIENT SERVICES | Facility: HOSPITAL | Age: 61
LOS: 1 days | Discharge: HOME | End: 2021-03-28

## 2021-03-28 DIAGNOSIS — Z98.890 OTHER SPECIFIED POSTPROCEDURAL STATES: Chronic | ICD-10-CM

## 2021-03-28 DIAGNOSIS — K63.5 POLYP OF COLON: Chronic | ICD-10-CM

## 2021-03-28 DIAGNOSIS — Z11.59 ENCOUNTER FOR SCREENING FOR OTHER VIRAL DISEASES: ICD-10-CM

## 2021-03-31 ENCOUNTER — RESULT REVIEW (OUTPATIENT)
Age: 61
End: 2021-03-31

## 2021-03-31 ENCOUNTER — INPATIENT (INPATIENT)
Facility: HOSPITAL | Age: 61
LOS: 0 days | Discharge: HOME HEALTH | End: 2021-04-01
Attending: ORTHOPAEDIC SURGERY | Admitting: ORTHOPAEDIC SURGERY
Payer: MEDICARE

## 2021-03-31 VITALS
OXYGEN SATURATION: 100 % | HEIGHT: 66 IN | DIASTOLIC BLOOD PRESSURE: 70 MMHG | TEMPERATURE: 98 F | SYSTOLIC BLOOD PRESSURE: 118 MMHG | WEIGHT: 173.94 LBS | RESPIRATION RATE: 18 BRPM

## 2021-03-31 DIAGNOSIS — Z98.890 OTHER SPECIFIED POSTPROCEDURAL STATES: Chronic | ICD-10-CM

## 2021-03-31 DIAGNOSIS — Z87.39 PERSONAL HISTORY OF OTHER DISEASES OF THE MUSCULOSKELETAL SYSTEM AND CONNECTIVE TISSUE: ICD-10-CM

## 2021-03-31 DIAGNOSIS — K63.5 POLYP OF COLON: Chronic | ICD-10-CM

## 2021-03-31 PROCEDURE — 99231 SBSQ HOSP IP/OBS SF/LOW 25: CPT

## 2021-03-31 PROCEDURE — 88311 DECALCIFY TISSUE: CPT | Mod: 26

## 2021-03-31 PROCEDURE — 88304 TISSUE EXAM BY PATHOLOGIST: CPT | Mod: 26

## 2021-03-31 PROCEDURE — 73560 X-RAY EXAM OF KNEE 1 OR 2: CPT | Mod: 26,LT

## 2021-03-31 RX ORDER — ASPIRIN/CALCIUM CARB/MAGNESIUM 324 MG
81 TABLET ORAL
Refills: 0 | Status: DISCONTINUED | OUTPATIENT
Start: 2021-04-01 | End: 2021-04-01

## 2021-03-31 RX ORDER — ONDANSETRON 8 MG/1
4 TABLET, FILM COATED ORAL EVERY 6 HOURS
Refills: 0 | Status: DISCONTINUED | OUTPATIENT
Start: 2021-03-31 | End: 2021-04-01

## 2021-03-31 RX ORDER — CHLORHEXIDINE GLUCONATE 213 G/1000ML
1 SOLUTION TOPICAL
Refills: 0 | Status: DISCONTINUED | OUTPATIENT
Start: 2021-03-31 | End: 2021-04-01

## 2021-03-31 RX ORDER — TOFACITINIB 11 MG/1
1 TABLET, FILM COATED, EXTENDED RELEASE ORAL
Qty: 0 | Refills: 0 | DISCHARGE

## 2021-03-31 RX ORDER — KETOROLAC TROMETHAMINE 30 MG/ML
15 SYRINGE (ML) INJECTION EVERY 6 HOURS
Refills: 0 | Status: DISCONTINUED | OUTPATIENT
Start: 2021-03-31 | End: 2021-04-01

## 2021-03-31 RX ORDER — CEFAZOLIN SODIUM 1 G
2000 VIAL (EA) INJECTION EVERY 8 HOURS
Refills: 0 | Status: COMPLETED | OUTPATIENT
Start: 2021-03-31 | End: 2021-03-31

## 2021-03-31 RX ORDER — HYDROXYZINE HCL 10 MG
50 TABLET ORAL ONCE
Refills: 0 | Status: COMPLETED | OUTPATIENT
Start: 2021-03-31 | End: 2021-03-31

## 2021-03-31 RX ORDER — SODIUM CHLORIDE 9 MG/ML
1000 INJECTION INTRAMUSCULAR; INTRAVENOUS; SUBCUTANEOUS
Refills: 0 | Status: ACTIVE | OUTPATIENT
Start: 2021-03-31 | End: 2022-02-27

## 2021-03-31 RX ORDER — OXYCODONE HYDROCHLORIDE 5 MG/1
5 TABLET ORAL EVERY 4 HOURS
Refills: 0 | Status: DISCONTINUED | OUTPATIENT
Start: 2021-03-31 | End: 2021-04-01

## 2021-03-31 RX ORDER — CELECOXIB 200 MG/1
200 CAPSULE ORAL EVERY 12 HOURS
Refills: 0 | Status: DISCONTINUED | OUTPATIENT
Start: 2021-04-01 | End: 2021-04-01

## 2021-03-31 RX ORDER — ACETAMINOPHEN 500 MG
650 TABLET ORAL EVERY 6 HOURS
Refills: 0 | Status: DISCONTINUED | OUTPATIENT
Start: 2021-03-31 | End: 2021-04-01

## 2021-03-31 RX ORDER — PANTOPRAZOLE SODIUM 20 MG/1
40 TABLET, DELAYED RELEASE ORAL
Refills: 0 | Status: DISCONTINUED | OUTPATIENT
Start: 2021-03-31 | End: 2021-04-01

## 2021-03-31 RX ORDER — DEXAMETHASONE 0.5 MG/5ML
4 ELIXIR ORAL ONCE
Refills: 0 | Status: COMPLETED | OUTPATIENT
Start: 2021-04-01 | End: 2021-04-01

## 2021-03-31 RX ORDER — TRAMADOL HYDROCHLORIDE 50 MG/1
50 TABLET ORAL EVERY 4 HOURS
Refills: 0 | Status: DISCONTINUED | OUTPATIENT
Start: 2021-03-31 | End: 2021-04-01

## 2021-03-31 RX ORDER — ACETAMINOPHEN 500 MG
1000 TABLET ORAL ONCE
Refills: 0 | Status: COMPLETED | OUTPATIENT
Start: 2021-03-31 | End: 2021-03-31

## 2021-03-31 RX ORDER — SENNA PLUS 8.6 MG/1
2 TABLET ORAL AT BEDTIME
Refills: 0 | Status: DISCONTINUED | OUTPATIENT
Start: 2021-03-31 | End: 2021-04-01

## 2021-03-31 RX ADMIN — Medication 15 MILLIGRAM(S): at 19:37

## 2021-03-31 RX ADMIN — Medication 15 MILLIGRAM(S): at 23:49

## 2021-03-31 RX ADMIN — Medication 50 MILLIGRAM(S): at 23:50

## 2021-03-31 RX ADMIN — Medication 1000 MILLIGRAM(S): at 09:14

## 2021-03-31 RX ADMIN — Medication 650 MILLIGRAM(S): at 19:37

## 2021-03-31 RX ADMIN — Medication 650 MILLIGRAM(S): at 23:50

## 2021-03-31 RX ADMIN — Medication 650 MILLIGRAM(S): at 19:38

## 2021-03-31 RX ADMIN — Medication 100 MILLIGRAM(S): at 14:04

## 2021-03-31 RX ADMIN — Medication 100 MILLIGRAM(S): at 21:59

## 2021-03-31 RX ADMIN — Medication 15 MILLIGRAM(S): at 19:38

## 2021-03-31 RX ADMIN — SODIUM CHLORIDE 100 MILLILITER(S): 9 INJECTION INTRAMUSCULAR; INTRAVENOUS; SUBCUTANEOUS at 19:58

## 2021-03-31 RX ADMIN — SENNA PLUS 2 TABLET(S): 8.6 TABLET ORAL at 21:49

## 2021-03-31 NOTE — CONSULT NOTE ADULT - SUBJECTIVE AND OBJECTIVE BOX
61 y/o F with PMHx of Anemia, history of GI bleed, Anxiety, Depression, Fibromyalgia, PTSD, Rheumatoid arthritis, Obesity presents for scheduled for left TKA (total knee arthroplasty). Patient had been experiencing left knee pain for some time. He is now s/p TKA left. Medicine was consulted for medical co-management. Patient was seen and examined by myself. Reports his pain is well controlled.    REVIEW OF SYSTEMS:  CONSTITUTIONAL: No fever, weight loss, or fatigue  EYES: No eye pain, visual disturbances, or discharge  ENMT:  No difficulty hearing, tinnitus, vertigo; No sinus or throat pain  NECK: No pain or stiffness  RESPIRATORY: No cough, wheezing, chills or hemoptysis; No shortness of breath  CARDIOVASCULAR: No chest pain, palpitations, dizziness, or leg swelling  GASTROINTESTINAL: No abdominal or epigastric pain. No nausea, vomiting, or hematemesis  GENITOURINARY: No dysuria, frequency, hematuria, or incontinence  NEUROLOGICAL: No headaches, memory loss, loss of strength, numbness, or tremors  MUSCULOSKELETAL: left knee pain well controlled      T(C): 36.7 (03-31-21 @ 16:45), Max: 36.7 (03-31-21 @ 08:53)  HR: 82 (03-31-21 @ 17:15) (77 - 90)  BP: 122/76 (03-31-21 @ 17:15) (118/67 - 141/80)  RR: 16 (03-31-21 @ 17:15) (16 - 18)  SpO2: 99% (03-31-21 @ 17:15) (99% - 100%)    PHYSICAL EXAM:  GENERAL: NAD, well-groomed, well-developed  HEAD:  Atraumatic, Normocephalic  EYES: EOMI, PERRLA, conjunctiva and sclera clear  ENMT: No tonsillar erythema, exudates, or enlargement; Moist mucous membranes  NECK: Supple, No JVD, Normal thyroid  NERVOUS SYSTEM:  Alert & Oriented X3, Good concentration; Motor Strength 5/5 B/L upper and lower extremities  CHEST/LUNG: Clear to percussion bilaterally; No rales, rhonchi, wheezing, or rubs  HEART: Regular rate and rhythm; No murmurs, rubs, or gallops  ABDOMEN: Soft, Nontender, Nondistended; Bowel sounds present  EXTREMITIES:  2+ Peripheral Pulses, No clubbing, cyanosis, or edema      Consultant(s) Notes Reviewed:  [x ] YES  [ ] NO  Care Discussed with Consultants/Other Providers [ x] YES  [ ] NO    LAB:                        Drug Dosing Weight  Height (cm): 167.6 (31 Mar 2021 08:53)  Weight (kg): 78.9 (31 Mar 2021 08:53)  BMI (kg/m2): 28.1 (31 Mar 2021 08:53)  BSA (m2): 1.88 (31 Mar 2021 08:53)  Height (cm): 167.6 (03-31-21 @ 08:53)  Weight (kg): 78.9 (03-31-21 @ 08:53)  BMI (kg/m2): 28.1 (03-31-21 @ 08:53)  BSA (m2): 1.88 (03-31-21 @ 08:53)  CAPILLARY BLOOD GLUCOSE      POCT Blood Glucose.: 90 mg/dL (31 Mar 2021 13:01)  POCT Blood Glucose.: 94 mg/dL (31 Mar 2021 08:41)    I&O's Summary        RADIOLOGY & ADDITIONAL TESTS:  Imaging Personally Reviewed:  [x] YES  [ ] NO    HEALTH ISSUES - PROBLEM Dx:  History of rheumatoid arthritis            MEDS:  acetaminophen   Tablet .. 650 milliGRAM(s) Oral every 6 hours  aluminum hydroxide/magnesium hydroxide/simethicone Suspension 30 milliLiter(s) Oral four times a day PRN  ceFAZolin   IVPB 2000 milliGRAM(s) IV Intermittent every 8 hours  chlorhexidine 4% Liquid 1 Application(s) Topical <User Schedule>  ketorolac   Injectable 15 milliGRAM(s) IV Push every 6 hours  multivitamin 1 Tablet(s) Oral daily  ondansetron Injectable 4 milliGRAM(s) IV Push every 6 hours PRN  oxyCODONE    IR 5 milliGRAM(s) Oral every 4 hours PRN  pantoprazole    Tablet 40 milliGRAM(s) Oral before breakfast  senna 2 Tablet(s) Oral at bedtime  sodium chloride 0.9%. 1000 milliLiter(s) IV Continuous <Continuous>  traMADol 50 milliGRAM(s) Oral every 4 hours PRN

## 2021-03-31 NOTE — ASU PATIENT PROFILE, ADULT - PSH
Colon polyp    History of cholecystectomy    History of dilatation and curettage  April 2018  History of ovarian cystectomy  Right side by verical insion  History of surgery  repaired left achilles tendon 6/2018  History of surgery  gastric bypass 2003  S/P left knee arthroscopy  bilateral knee arthroscopy 2015

## 2021-03-31 NOTE — PHYSICAL THERAPY INITIAL EVALUATION ADULT - GAIT DEVIATIONS NOTED, PT EVAL
forward flexed trunk, dec heel strike and push off, episodes of L knee buckling/decreased denisha/decreased step length/decreased stride length/decreased weight-shifting ability

## 2021-03-31 NOTE — ASU PATIENT PROFILE, ADULT - PMH
Anemia    Anxiety    Depression  no suicide ideation  Fibromyalgia    History of food poisoning    OA (osteoarthritis)    Obesity    PTSD (post-traumatic stress disorder)    Rheumatoid arthritis    Sciatica  with numbness/tingling and dizziness

## 2021-03-31 NOTE — PHYSICAL THERAPY INITIAL EVALUATION ADULT - GENERAL OBSERVATIONS, REHAB EVAL
Pt encountered semi-reclined in bed in PACU, NAD, +ace wrap over L knee dressing, +IV (disconnected by RN for session), +sequentials, ok to be seen by PT as confirmed by RN and pt agreeable/eager. +chart reviewed

## 2021-03-31 NOTE — PHYSICAL THERAPY INITIAL EVALUATION ADULT - ADDITIONAL COMMENTS
Pt lives alone in a 5th floor apartment in a building. Pt reports she can enter the building in the back via ramps to the elevator, or go up stairs in the front of building (will need to clarify how many and railing situation). Pt reports in her apt there are 2 steps with rail down to her sunken living room; as well as 1 step down without railing from her kitchen . Pt reports she has a RW, SC, wheelchair, commode and crutches from previous other surgeries. Prior to surgery amb without AD, but with difficulty

## 2021-03-31 NOTE — BRIEF OPERATIVE NOTE - COMMENTS
Fall & [place] tka yared  vanco powder placed above and below the fascia total 2 grams    wbat pain cocktail given asa for dvt prophylaxis   tq time 60 min   the pt can start the xeljanz thierry inhibitor 2weeks from the date of surgery

## 2021-03-31 NOTE — CONSULT NOTE ADULT - ASSESSMENT
61 y/o F with PMHx of Anemia, history of GI bleed, Anxiety, Depression, Fibromyalgia, PTSD, Rheumatoid arthritis, Obesity presents for scheduled for left TKA (total knee arthroplasty). Patient had been experiencing left knee pain for some time. He is now s/p TKA left. Medicine was consulted for medical co-management. Patient was seen and examined by myself. Reports his pain is well controlled.    -DVT ppx per primary  -PT/OT  -pain control per primary  -bowel regimen  -incentive spirometer  -agree with continuing home anti-hypertensives  -recommend follow up with PCP in 1-2 weeks     61 y/o F with PMHx of Anemia, history of GI bleed, Anxiety, Depression, Fibromyalgia, PTSD, Rheumatoid arthritis, Obesity presents for scheduled for left TKA (total knee arthroplasty). Patient had been experiencing left knee pain for some time. He is now s/p TKA left. Medicine was consulted for medical co-management. Patient was seen and examined by myself. Reports his pain is well controlled.    #OA of left knee, s/p left TKA (total knee arthroplasty)  #Anemia, history of GI bleed  #Anxiety, Depression, Fibromyalgia, PTSD  #Rheumatoid arthritis  #Obesity    Recommendation:  -DVT ppx per primary  -PT/OT  -pain control per primary  -bowel regimen  -incentive spirometer  -agree with continuing home medications  -recommend follow up with PCP in 1-2 weeks  -f/u with Rheum at outpatient  -would recommend getting AM CBC    Thank you for consulting medicine. Please call us with any questions.

## 2021-04-01 ENCOUNTER — TRANSCRIPTION ENCOUNTER (OUTPATIENT)
Age: 61
End: 2021-04-01

## 2021-04-01 VITALS
RESPIRATION RATE: 16 BRPM | HEART RATE: 94 BPM | SYSTOLIC BLOOD PRESSURE: 128 MMHG | DIASTOLIC BLOOD PRESSURE: 72 MMHG | TEMPERATURE: 97 F

## 2021-04-01 LAB
COVID-19 SPIKE DOMAIN AB INTERP: NEGATIVE — SIGNIFICANT CHANGE UP
COVID-19 SPIKE DOMAIN ANTIBODY RESULT: 0.4 U/ML — SIGNIFICANT CHANGE UP
SARS-COV-2 IGG+IGM SERPL QL IA: 0.4 U/ML — SIGNIFICANT CHANGE UP
SARS-COV-2 IGG+IGM SERPL QL IA: NEGATIVE — SIGNIFICANT CHANGE UP

## 2021-04-01 RX ORDER — OXYCODONE HYDROCHLORIDE 5 MG/1
1 TABLET ORAL
Qty: 42 | Refills: 0
Start: 2021-04-01 | End: 2021-04-07

## 2021-04-01 RX ORDER — OXYCODONE HYDROCHLORIDE 5 MG/1
1 TABLET ORAL
Qty: 9 | Refills: 0
Start: 2021-04-01 | End: 2021-04-03

## 2021-04-01 RX ORDER — SENNA PLUS 8.6 MG/1
2 TABLET ORAL
Qty: 0 | Refills: 0 | DISCHARGE
Start: 2021-04-01

## 2021-04-01 RX ORDER — ASPIRIN/CALCIUM CARB/MAGNESIUM 324 MG
1 TABLET ORAL
Qty: 70 | Refills: 0
Start: 2021-04-01 | End: 2021-05-05

## 2021-04-01 RX ORDER — TRAMADOL HYDROCHLORIDE 50 MG/1
1 TABLET ORAL
Qty: 42 | Refills: 0
Start: 2021-04-01 | End: 2021-04-07

## 2021-04-01 RX ORDER — ACETAMINOPHEN 500 MG
2 TABLET ORAL
Qty: 0 | Refills: 0 | DISCHARGE
Start: 2021-04-01

## 2021-04-01 RX ADMIN — Medication 15 MILLIGRAM(S): at 06:13

## 2021-04-01 RX ADMIN — PANTOPRAZOLE SODIUM 40 MILLIGRAM(S): 20 TABLET, DELAYED RELEASE ORAL at 06:00

## 2021-04-01 RX ADMIN — Medication 650 MILLIGRAM(S): at 00:20

## 2021-04-01 RX ADMIN — Medication 15 MILLIGRAM(S): at 11:54

## 2021-04-01 RX ADMIN — TRAMADOL HYDROCHLORIDE 50 MILLIGRAM(S): 50 TABLET ORAL at 03:28

## 2021-04-01 RX ADMIN — TRAMADOL HYDROCHLORIDE 50 MILLIGRAM(S): 50 TABLET ORAL at 03:58

## 2021-04-01 RX ADMIN — Medication 15 MILLIGRAM(S): at 11:38

## 2021-04-01 RX ADMIN — OXYCODONE HYDROCHLORIDE 5 MILLIGRAM(S): 5 TABLET ORAL at 11:38

## 2021-04-01 RX ADMIN — Medication 1 TABLET(S): at 11:37

## 2021-04-01 RX ADMIN — Medication 4 MILLIGRAM(S): at 11:39

## 2021-04-01 RX ADMIN — Medication 81 MILLIGRAM(S): at 06:00

## 2021-04-01 RX ADMIN — OXYCODONE HYDROCHLORIDE 5 MILLIGRAM(S): 5 TABLET ORAL at 07:17

## 2021-04-01 RX ADMIN — Medication 15 MILLIGRAM(S): at 05:43

## 2021-04-01 RX ADMIN — Medication 650 MILLIGRAM(S): at 06:14

## 2021-04-01 RX ADMIN — OXYCODONE HYDROCHLORIDE 5 MILLIGRAM(S): 5 TABLET ORAL at 12:16

## 2021-04-01 RX ADMIN — Medication 650 MILLIGRAM(S): at 11:38

## 2021-04-01 RX ADMIN — Medication 650 MILLIGRAM(S): at 11:53

## 2021-04-01 RX ADMIN — Medication 650 MILLIGRAM(S): at 05:44

## 2021-04-01 RX ADMIN — Medication 15 MILLIGRAM(S): at 00:19

## 2021-04-01 NOTE — DISCHARGE NOTE PROVIDER - NSDCMRMEDTOKEN_GEN_ALL_CORE_FT
acetaminophen 325 mg oral tablet: 2 tab(s) orally every 6 hours x 14 days  aspirin 81 mg oral delayed release tablet: 1 tab(s) orally 2 times a day  Calcium 600+D 600 mg-200 intl units oral tablet: orally 2 times a day  Cod Liver oral oil: 1 cap(s) orally once a day  cyclobenzaprine 30 mg oral capsule, extended release: 1 cap(s) orally once a day  ferrous sulfate 325 mg (65 mg elemental iron) oral delayed release tablet: 1 tab(s) orally once a day   indomethacin 25 mg oral capsule: 1 cap(s) orally 2 times a day  oxyCODONE 5 mg oral tablet: 1 tab(s) orally every 4 hours, As Needed -Severe Pain (7 - 10) MDD:6  tablets  Protonix 40 mg oral delayed release tablet: 1 tab(s) orally 2 times a day   senna oral tablet: 2 tab(s) orally once a day (at bedtime)  Xeljanz XR 11 mg oral tablet, extended release: 1 tab(s) orally once a day YOU MAY RESUME THIS MEDICATION IN 2 WEEKS

## 2021-04-01 NOTE — DISCHARGE NOTE PROVIDER - NSDCFUADDINST_GEN_ALL_CORE_FT
Keep surgical site clean and dry, may remove dressing in 7  days . Call Dr. Rick  if any wound drainage, redness , increasing pain, fevers over 101 or if you have any questions or concerns.     You may shower with the bandage on and once it is removed. Once it is removed  , do not scrub surgical site. Do not apply any lotions/moisturizers/creams to surgical site.    Call to make your  post op appointment if you do not have one already.     YOU MAY RESUME XELJANZ IN 2 WEEKS

## 2021-04-01 NOTE — DISCHARGE NOTE PROVIDER - NSDCCPCAREPLAN_GEN_ALL_CORE_FT
PRINCIPAL DISCHARGE DIAGNOSIS  Diagnosis: Rheumatoid arthritis involving left knee  Assessment and Plan of Treatment:

## 2021-04-01 NOTE — OCCUPATIONAL THERAPY INITIAL EVALUATION ADULT - LIVES WITH, PROFILE
with dog and cat in a apartment 6th floor. Elevator accessible, however patient reports elevator has broken down a few times. +tub with shower and grab bars/alone

## 2021-04-01 NOTE — OCCUPATIONAL THERAPY INITIAL EVALUATION ADULT - GENERAL OBSERVATIONS, REHAB EVAL
10:00-11:00 chart reviewed, ok to treat by Occupational Therapist as confirmed by RN, Pt received seated at edge of bed +aquacel on left knee in NAD. Pt reported 8/10 pain in left knee however in agreement with OT IE

## 2021-04-01 NOTE — DISCHARGE NOTE PROVIDER - CARE PROVIDER_API CALL
Carlton Fitch)  Orthopaedic Surgery  [address] [place] [address]  Phone: [phone]  Fax: [phone]  Follow Up Time:

## 2021-04-01 NOTE — DISCHARGE NOTE PROVIDER - HOSPITAL COURSE
60 year old female with a past medical history of rheumatoid arthritis, anemia, gerd and anxiety, admitted for an elective Total Knee Arthoplasty . Erika tolerated surgery well with no intra/post operative complications. She was given intra/post operative antibiotics for infection prophylaxis and will be discharged on Aspirin 81mg BID x35 days to lower the risk of blood clots. She worked with Physical Therapy while admitted to the hospital and is stable for discharge.

## 2021-04-01 NOTE — PROGRESS NOTE ADULT - SUBJECTIVE AND OBJECTIVE BOX
ORTHO PROGRESS NOTE       60y Female POD #  1    S/P left Total Knee Arthoplasty     Patient seen and examined at bedside . The patient is awake and alert in NAD. No complaints of chest pain, SOB, N/V.    PAST MEDICAL & SURGICAL HISTORY:  Rheumatoid arthritis    OA (osteoarthritis)    Fibromyalgia    Depression  no suicide ideation    Obesity    Anxiety    PTSD (post-traumatic stress disorder)    Sciatica  with numbness/tingling and dizziness    Anemia    History of food poisoning    History of surgery  gastric bypass 2003    History of cholecystectomy    S/P left knee arthroscopy  bilateral knee arthroscopy 2015    History of dilatation and curettage  April 2018    Colon polyp    History of surgery  repaired left achilles tendon 6/2018    History of ovarian cystectomy  Right side by verical insion          MEDICATIONS  (STANDING):  acetaminophen   Tablet .. 650 milliGRAM(s) Oral every 6 hours  aspirin enteric coated 81 milliGRAM(s) Oral two times a day  chlorhexidine 4% Liquid 1 Application(s) Topical <User Schedule>  dexAMETHasone     Tablet 4 milliGRAM(s) Oral once  ketorolac   Injectable 15 milliGRAM(s) IV Push every 6 hours  multivitamin 1 Tablet(s) Oral daily  pantoprazole    Tablet 40 milliGRAM(s) Oral before breakfast  senna 2 Tablet(s) Oral at bedtime    MEDICATIONS  (PRN):  aluminum hydroxide/magnesium hydroxide/simethicone Suspension 30 milliLiter(s) Oral four times a day PRN Indigestion  ondansetron Injectable 4 milliGRAM(s) IV Push every 6 hours PRN Nausea and/or Vomiting  oxyCODONE    IR 5 milliGRAM(s) Oral every 4 hours PRN Severe Pain (7 - 10)  traMADol 50 milliGRAM(s) Oral every 4 hours PRN Mild Pain (1 - 3)        Vital Signs Last 24 Hrs  T(C): 36.6 (01 Apr 2021 05:38), Max: 36.7 (31 Mar 2021 08:53)  T(F): 97.9 (01 Apr 2021 05:38), Max: 98 (31 Mar 2021 08:53)  HR: 79 (01 Apr 2021 05:38) (77 - 90)  BP: 129/64 (01 Apr 2021 05:38) (114/61 - 141/80)  BP(mean): --  RR: 16 (01 Apr 2021 05:38) (16 - 18)  SpO2: 99% (31 Mar 2021 17:15) (99% - 100%)                          9.0    3.78  )-----------( 196      ( 01 Apr 2021 06:09 )             27.2     04-01    138  |  104  |  21<H>  ----------------------------<  91  3.7   |  24  |  1.0    Ca    8.6      01 Apr 2021 06:09            DVT ppx : aspirin         PE:  The patient was seen and examined at bedside          A&OX3, NAD          dressing C/D/I          Compartments soft         NVI, SILT           A/P:              POD # 1      s/p   left Total Knee Arthoplasty                     OOB to Chair            Physical Therapy-           Pain control - per pain protocol            Incentive Spirometry            DVT Prophylaxis - aspirin                     RA- may resume Xeljanz in 2 weeks            Anemia- stable continue home iron supplement            Anxiety/PTSD- stable                   GERD- continue Protonix                   Dispo: home with home care  
 TKA ORTHOPEDIC POST OP CHECK    T(C): 36.4 (03-31-21 @ 12:55), Max: 36.7 (03-31-21 @ 08:53)  HR: 79 (03-31-21 @ 14:27) (77 - 87)  BP: 141/80 (03-31-21 @ 14:27) (118/67 - 141/80)  RR: 17 (03-31-21 @ 14:27) (16 - 18)  SpO2: 100% (03-31-21 @ 14:27) (99% - 100%)      preop hgb 11.3          S/P TKA ARTHROPLASTY  PAIN CONTROLLED  VSS   A&O X3  DRESSING C/D/I  NVI  ANTIBIOTICS INFUSED  DVT PROPHYLAXIS ORDERED  ENCOURAGE INCENTIVE SPIROMETRY  AM LABS  REHAB TO BEGIN pod0  D/C PLANNING

## 2021-04-01 NOTE — OCCUPATIONAL THERAPY INITIAL EVALUATION ADULT - REHAB POTENTIAL, OT EVAL
Patient demonstrated good understanding of home safety and safe car transfers/good, to achieve stated therapy goals

## 2021-04-01 NOTE — DISCHARGE NOTE NURSING/CASE MANAGEMENT/SOCIAL WORK - PATIENT PORTAL LINK FT
You can access the FollowMyHealth Patient Portal offered by [facility] by registering at the following website: [email or web address]. By joining [facility]’s FollowMyHealth portal, you will also be able to view your health information using other applications (apps) compatible with our system.

## 2021-04-07 DIAGNOSIS — M79.7 FIBROMYALGIA: ICD-10-CM

## 2021-04-07 DIAGNOSIS — F32.9 MAJOR DEPRESSIVE DISORDER, SINGLE EPISODE, UNSPECIFIED: ICD-10-CM

## 2021-04-07 DIAGNOSIS — K21.9 GASTRO-ESOPHAGEAL REFLUX DISEASE WITHOUT ESOPHAGITIS: ICD-10-CM

## 2021-04-07 DIAGNOSIS — M21.062 VALGUS DEFORMITY, NOT ELSEWHERE CLASSIFIED, LEFT KNEE: ICD-10-CM

## 2021-04-07 DIAGNOSIS — M06.9 RHEUMATOID ARTHRITIS, UNSPECIFIED: ICD-10-CM

## 2021-04-07 DIAGNOSIS — F43.10 POST-TRAUMATIC STRESS DISORDER, UNSPECIFIED: ICD-10-CM

## 2021-04-07 DIAGNOSIS — F41.9 ANXIETY DISORDER, UNSPECIFIED: ICD-10-CM

## 2021-04-07 DIAGNOSIS — D50.9 IRON DEFICIENCY ANEMIA, UNSPECIFIED: ICD-10-CM

## 2021-04-07 DIAGNOSIS — M24.562 CONTRACTURE, LEFT KNEE: ICD-10-CM

## 2021-04-07 DIAGNOSIS — E66.9 OBESITY, UNSPECIFIED: ICD-10-CM

## 2021-04-07 DIAGNOSIS — M17.12 UNILATERAL PRIMARY OSTEOARTHRITIS, LEFT KNEE: ICD-10-CM

## 2021-04-07 DIAGNOSIS — Z98.84 BARIATRIC SURGERY STATUS: ICD-10-CM

## 2021-04-15 RX ORDER — TOFACITINIB 11 MG/1
1 TABLET, FILM COATED, EXTENDED RELEASE ORAL
Qty: 0 | Refills: 0 | DISCHARGE
Start: 2021-04-15

## 2021-08-05 NOTE — H&P PST ADULT - MUSCULOSKELETAL
Lulu is a 34 year old year old female, , here for an OB visit. Gestational Age 31w4d; PENNIE 10/3/2021. Pt has no complaints. She denies headaches, vision changes, nausea, vomiting, diarrhea, constipation, RUQ/epigastric pain, regular contractions, leakage of fluid, vaginal bleeding, dysuria, abnormal vaginal discharge or edema. Reports good fetal movement.    PNC:    * EFW 4lb 15oz (93%)  * discussed  testing at 36 weeks due to BMI of 50  * growth US at 37 weeks    Danger S/S and FMC reviewed; contact the office with any concerns.  RTC in 2 week(s) or sooner if needed.    All of the patient’s questions were answered; she verbalizes understanding and is in agreement with the above mentioned plan.  She is certainly encouraged to call my office should any questions or concerns develop prior to her follow up appointment.    MADYSON Calvo     No joint pain, swelling or deformity; no limitation of movement

## 2021-11-10 NOTE — ASU DISCHARGE PLAN (ADULT/PEDIATRIC). - ASU FOLLOWUP
[facility]:  [facility]... Hernán Lott)  Plastic Surgery; Surgery of the Hand  [address] [place] [address], [address], [place] [address]  Phone: [phone]  Fax: [phone]  Follow Up Time:

## 2022-01-18 NOTE — H&P PST ADULT - PRO ARRIVE FROM
----- Message from Nikki Fang sent at 1/18/2022  8:31 AM CST -----  Regarding: Appointment  Type:  Appointment Request    .    Name of Caller:Patient requesting to see Dr Mcbride.   Patient states has skin Cancer need appointment  When is the first available appointment?  Symptoms:  Best Call Back Number [phone]  Additional Information:        home

## 2022-03-23 NOTE — OCCUPATIONAL THERAPY INITIAL EVALUATION ADULT - BALANCE DISTURBANCE, IDENTIFIED IMPAIRMENT CONTRIBUTE, REHAB EVAL
Trial of adderall xr 15 mg this month.  FU in 1 month to evaluate the effect.   8/10 Left knee/pain/decreased ROM

## 2022-03-25 ENCOUNTER — EMERGENCY (EMERGENCY)
Facility: HOSPITAL | Age: 62
LOS: 0 days | Discharge: HOME | End: 2022-03-25
Attending: EMERGENCY MEDICINE | Admitting: EMERGENCY MEDICINE
Payer: MEDICARE

## 2022-03-25 VITALS
TEMPERATURE: 99 F | HEART RATE: 70 BPM | RESPIRATION RATE: 18 BRPM | SYSTOLIC BLOOD PRESSURE: 148 MMHG | OXYGEN SATURATION: 100 % | HEIGHT: 66 IN | DIASTOLIC BLOOD PRESSURE: 88 MMHG

## 2022-03-25 DIAGNOSIS — Z86.2 PERSONAL HISTORY OF DISEASES OF THE BLOOD AND BLOOD-FORMING ORGANS AND CERTAIN DISORDERS INVOLVING THE IMMUNE MECHANISM: ICD-10-CM

## 2022-03-25 DIAGNOSIS — Z98.890 OTHER SPECIFIED POSTPROCEDURAL STATES: Chronic | ICD-10-CM
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
## 2022-03-25 DIAGNOSIS — Z96.659 PRESENCE OF UNSPECIFIED ARTIFICIAL KNEE JOINT: ICD-10-CM
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
## 2022-03-25 DIAGNOSIS — M06.9 RHEUMATOID ARTHRITIS, UNSPECIFIED: ICD-10-CM

## 2022-03-25 DIAGNOSIS — M25.551 PAIN IN RIGHT HIP: ICD-10-CM
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
## 2022-03-25 DIAGNOSIS — F32.A DEPRESSION, UNSPECIFIED: ICD-10-CM

## 2022-03-25 DIAGNOSIS — K63.5 POLYP OF COLON: Chronic | ICD-10-CM

## 2022-03-25 DIAGNOSIS — E66.9 OBESITY, UNSPECIFIED: ICD-10-CM

## 2022-03-25 DIAGNOSIS — Z79.82 LONG TERM (CURRENT) USE OF ASPIRIN: ICD-10-CM

## 2022-03-25 DIAGNOSIS — Z91.018 ALLERGY TO OTHER FOODS: ICD-10-CM

## 2022-03-25 DIAGNOSIS — M19.90 UNSPECIFIED OSTEOARTHRITIS, UNSPECIFIED SITE: ICD-10-CM

## 2022-03-25 DIAGNOSIS — F41.9 ANXIETY DISORDER, UNSPECIFIED: ICD-10-CM

## 2022-03-25 LAB
ALBUMIN SERPL ELPH-MCNC: 4 G/DL — SIGNIFICANT CHANGE UP (ref 3.5–5.2)
ALP SERPL-CCNC: 93 U/L — SIGNIFICANT CHANGE UP (ref 30–115)
ALT FLD-CCNC: 19 U/L — SIGNIFICANT CHANGE UP (ref 0–41)
ANION GAP SERPL CALC-SCNC: 11 MMOL/L — SIGNIFICANT CHANGE UP (ref 7–14)
AST SERPL-CCNC: 22 U/L — SIGNIFICANT CHANGE UP (ref 0–41)
BASOPHILS # BLD AUTO: 0.02 K/UL — SIGNIFICANT CHANGE UP (ref 0–0.2)
BASOPHILS NFR BLD AUTO: 0.5 % — SIGNIFICANT CHANGE UP (ref 0–1)
BILIRUB SERPL-MCNC: 0.3 MG/DL — SIGNIFICANT CHANGE UP (ref 0.2–1.2)
BLD GP AB SCN SERPL QL: SIGNIFICANT CHANGE UP
BUN SERPL-MCNC: 18 MG/DL — SIGNIFICANT CHANGE UP (ref 10–20)
CALCIUM SERPL-MCNC: 9.6 MG/DL — SIGNIFICANT CHANGE UP (ref 8.5–10.1)
CHLORIDE SERPL-SCNC: 104 MMOL/L — SIGNIFICANT CHANGE UP (ref 98–110)
CO2 SERPL-SCNC: 24 MMOL/L — SIGNIFICANT CHANGE UP (ref 17–32)
CREAT SERPL-MCNC: 0.9 MG/DL — SIGNIFICANT CHANGE UP (ref 0.7–1.5)
EGFR: 74 ML/MIN/1.73M2 — SIGNIFICANT CHANGE UP
EOSINOPHIL # BLD AUTO: 0.11 K/UL — SIGNIFICANT CHANGE UP (ref 0–0.7)
EOSINOPHIL NFR BLD AUTO: 2.8 % — SIGNIFICANT CHANGE UP (ref 0–8)
GLUCOSE SERPL-MCNC: 82 MG/DL — SIGNIFICANT CHANGE UP (ref 70–99)
HCT VFR BLD CALC: 31.2 % — LOW (ref 37–47)
HGB BLD-MCNC: 10.8 G/DL — LOW (ref 12–16)
IMM GRANULOCYTES NFR BLD AUTO: 0 % — LOW (ref 0.1–0.3)
LYMPHOCYTES # BLD AUTO: 1.21 K/UL — SIGNIFICANT CHANGE UP (ref 1.2–3.4)
LYMPHOCYTES # BLD AUTO: 30.4 % — SIGNIFICANT CHANGE UP (ref 20.5–51.1)
MCHC RBC-ENTMCNC: 32.5 PG — HIGH (ref 27–31)
MCHC RBC-ENTMCNC: 34.6 G/DL — SIGNIFICANT CHANGE UP (ref 32–37)
MCV RBC AUTO: 94 FL — SIGNIFICANT CHANGE UP (ref 81–99)
MONOCYTES # BLD AUTO: 0.47 K/UL — SIGNIFICANT CHANGE UP (ref 0.1–0.6)
MONOCYTES NFR BLD AUTO: 11.8 % — HIGH (ref 1.7–9.3)
NEUTROPHILS # BLD AUTO: 2.17 K/UL — SIGNIFICANT CHANGE UP (ref 1.4–6.5)
NEUTROPHILS NFR BLD AUTO: 54.5 % — SIGNIFICANT CHANGE UP (ref 42.2–75.2)
NRBC # BLD: 0 /100 WBCS — SIGNIFICANT CHANGE UP (ref 0–0)
PLATELET # BLD AUTO: 248 K/UL — SIGNIFICANT CHANGE UP (ref 130–400)
POTASSIUM SERPL-MCNC: 4.7 MMOL/L — SIGNIFICANT CHANGE UP (ref 3.5–5)
POTASSIUM SERPL-SCNC: 4.7 MMOL/L — SIGNIFICANT CHANGE UP (ref 3.5–5)
PROT SERPL-MCNC: 6.1 G/DL — SIGNIFICANT CHANGE UP (ref 6–8)
RBC # BLD: 3.32 M/UL — LOW (ref 4.2–5.4)
RBC # FLD: 13.4 % — SIGNIFICANT CHANGE UP (ref 11.5–14.5)
SODIUM SERPL-SCNC: 139 MMOL/L — SIGNIFICANT CHANGE UP (ref 135–146)
WBC # BLD: 3.98 K/UL — LOW (ref 4.8–10.8)
WBC # FLD AUTO: 3.98 K/UL — LOW (ref 4.8–10.8)

## 2022-03-25 PROCEDURE — 73502 X-RAY EXAM HIP UNI 2-3 VIEWS: CPT | Mod: 26,RT

## 2022-03-25 PROCEDURE — 73700 CT LOWER EXTREMITY W/O DYE: CPT | Mod: 26,RT,MA

## 2022-03-25 PROCEDURE — 99284 EMERGENCY DEPT VISIT MOD MDM: CPT

## 2022-03-25 PROCEDURE — 73552 X-RAY EXAM OF FEMUR 2/>: CPT | Mod: 26,RT

## 2022-03-25 NOTE — ED PROVIDER NOTE - ATTENDING CONTRIBUTION TO CARE
62 yo f with pmh of anemia, oa, fibromyalgia, anxiety, sent by dr. hip henriquez for R hip fx.  pt says has been having chronic R hip pain, was told that she was "bone on bone" and would eventually need a hip replacement.  pt says in the last 6 weeks, she had fallen twice while snowboarding and again this past week when she tripped while walking her dog.  pt went to office today, had xrays done and was told she broke her hip and to go to ED.  exam: nad, ncat, perrl, eomi, mmm, rrr, ctab, abd soft, nt, nd aox3, ttp r hip, FROM imp: pt with R hip pain, r/o fx, xray, ct

## 2022-03-25 NOTE — ED ADULT NURSE NOTE - OBJECTIVE STATEMENT
Pt BIBA from Orthopedic office due to right hip fracture. States fell last week while skiing and yesterday when walked the dog. Was still able to walk to ortho office.

## 2022-03-25 NOTE — ED PROVIDER NOTE - PROVIDER TOKENS
FREE:[LAST:[hipflor],PHONE:[(   )    -],FAX:[(   )    -],FOLLOWUP:[1-3 Days]],PROVIDER:[TOKEN:[[id number]],FOLLOWUP:[1-3 Days]]

## 2022-03-25 NOTE — ED PROVIDER NOTE - PHYSICAL EXAMINATION
CONSTITUTIONAL: Well-developed; well-nourished; in no acute distress, nontoxic appearing  SKIN: skin exam is warm and dry  ENT: MMM   CARD: S1, S2 normal, no murmur  RESP:  Good air movement bilaterally   EXT: RLE: +TTP overlying R hip, no bony tenderness/deformity. pulses 2+. FROM knee, ankle. no midline tenderness.   NEURO: awake, alert, following commands, oriented, grossly unremarkable. No Focal deficits. GCS 15.   PSYCH: Cooperative, appropriate. CONSTITUTIONAL: Well-developed; well-nourished; in no acute distress, nontoxic appearing  SKIN: skin exam is warm and dry  ENT: MMM   CARD: S1, S2 normal, no murmur  RESP:  Good air movement bilaterally   EXT: RLE: +TTP overlying R hip, no bony tenderness/deformity. pulses 2+. FROM knee, ankle. no midline tenderness. steady gait   NEURO: awake, alert, following commands, oriented, grossly unremarkable. No Focal deficits. GCS 15.   PSYCH: Cooperative, appropriate.

## 2022-03-25 NOTE — ED ADULT TRIAGE NOTE - HEIGHT IN INCHES
Mailbox is full and cannot leave message. If patient should call back, please relay the below information. 6

## 2022-03-25 NOTE — ED ADULT NURSE NOTE - NSICDXPASTMEDICALHX_GEN_ALL_CORE_FT
PAST MEDICAL HISTORY:  Anemia     Anxiety     Depression no suicide ideation    Fibromyalgia     History of food poisoning     OA (osteoarthritis)     Obesity     PTSD (post-traumatic stress disorder)     Rheumatoid arthritis     Sciatica with numbness/tingling and dizziness

## 2022-03-25 NOTE — ED PROVIDER NOTE - CARE PROVIDERS DIRECT ADDRESSES
,DirectAddress_Unknown,[email or web address]

## 2022-03-25 NOTE — ED PROVIDER NOTE - OBJECTIVE STATEMENT
61 year old female, past medical history anemia, TKR, who presents with right hip pain. patient had outpatient XR hip performed @ ortho clinic, sent to ED for possible hip fracture. patient endorses fall that occurred x4 weeks ago while snowboarding resulting in pain to hip that has been persistent since. denies fever, chills, chest pain, shortness of breath, numbness/weakness, back pain, knee/ankle pain, skin changes.

## 2022-03-25 NOTE — ED PROVIDER NOTE - PROGRESS NOTE DETAILS
ortho aware. no signs of fx on CT. patient ambulating without difficulty. will discharge with physical therapy and ortho fu

## 2022-03-25 NOTE — ED PROVIDER NOTE - PATIENT PORTAL LINK FT
You can access the FollowMyHealth Patient Portal offered by [facility] by registering at the following website: [email or web address]. By joining [facility]’s FollowMyHealth portal, you will also be able to view your health information using other applications (apps) compatible with our system.

## 2022-03-25 NOTE — ED PROVIDER NOTE - NS ED ROS FT
Review of Systems:  	•	CONSTITUTIONAL: no fever   	•	SKIN: no rash   	•	RESPIRATORY: no shortness of breath, no cough  	•	CARDIAC: no chest pain, no palpitations  	•	GI: no abd pain, no nausea, no vomiting, no diarrhea   	•	MUSCULOSKELETAL: +R hip pain, no knee pain/ankle pain   	•	NEUROLOGIC: no weakness, no numbness  	•	PSYCH: no anxiety, non suicidal, non homicidal, no hallucination, no depression

## 2022-03-25 NOTE — ED PROVIDER NOTE - NSFOLLOWUPINSTRUCTIONS_ED_ALL_ED_FT
What causes hip pain?  In people older than 50, the most common causes of hip pain are:    ?Arthritis – Arthritis is the general term for "inflammation or damage of the joints." People whose hip pain is caused by arthritis usually develop pain in the groin or thigh, slowly over time.    ?Bursitis – There are three sacs called "bursae" in or near the hip (figure 1). These sacs are filled with fluid. They help cushion and protect the joints. "Bursitis" is the medical term for when one of these sacs gets irritated or inflamed. People whose hip pain is caused by bursitis usually feel more pain if they lay on their side, or if someone presses against the side of their hip.    ?Muscle or tendon strain – Three major muscle groups help move the hip. If you overuse these muscles or the tendons that attach them to your bones, it can lead to hip pain. This type of pain is usually worse when you move your leg in 1 particular direction.    ?Nerve problems – Lots of nerves pass by the hip. These nerves or nerves in the lower part of the spine can get pressed on or damaged and cause hip pain. People with pain caused by nerve problems also often feel tingling or numbness in the area. A pinched nerve in the spine could cause other problems, such as weakness in the leg.    Should I see a doctor or nurse for my hip pain?  See a doctor or nurse right away if:    ?Your pain is sudden and severe, prevents you from putting weight on that side, or is so bad that you can't rotate your leg to the side. Some people who have hip pain actually have a broken hip bone. This is especially likely after a fall or even a mild impact. Having a broken hip is serious and needs immediate medical attention.    ?Your hip is swollen, bruised, or bleeding    ?You also have a fever along with your hip pain    ?You have weakness in one of your legs or feet    How is hip pain treated?  The right treatment for hip pain depends on what is causing it. In general, treatments might include:    ?Taking medicines to reduce pain and/or inflammation.    ?Getting a shot of a medicine called a steroid, which can reduce inflammation (these steroids are not the same ones athletes take to build up muscle)    ?Physical therapy or exercises recommended by the doctor    If you have severe hip arthritis, your doctor or nurse might suggest surgery to replace the hip.    Is there anything I can do on my own to feel better?  Yes. Here are some things you can try:    ?Ask your doctor if there are stretches or exercises that can help with the cause of your pain. Before you do these exercises, warm up your muscles by taking a warm shower or bath.    ?Consider taking non-prescription pain medicines, such as acetaminophen (sample brand name: Tylenol) or naproxen (sample brand name: Aleve). But if you have heart disease or other health problems, or if you are on prescription medicines, ask your doctor before you start taking any new medicines.    ?Use a cane, walker, shoe insert, or other device, if it helps you

## 2022-03-25 NOTE — ED PROVIDER NOTE - CARE PROVIDER_API CALL
cristi,   Phone: (   )    -  Fax: (   )    -  Follow Up Time: 1-3 Days    [place] Fitch)  Orthopaedic Surgery  [address] [place] [address]  Phone: [phone]  Fax: [phone]  Follow Up Time: 1-3 Days

## 2022-04-06 NOTE — ASU PATIENT PROFILE, ADULT - DOES PATIENT HAVE ADVANCE DIRECTIVE
INDICATION: Cough and chest pain.



EXAMINATION: Frontal chest was obtained at 7:11 p.m.



FINDINGS: Heart and mediastinal silhouette are normal in

appearance. The lungs are clear. There is no pneumothorax or

pleural fluid.



IMPRESSION: Negative chest. 



Dictated by: 



  Dictated on workstation # [id number] Yes

## 2022-04-28 NOTE — H&P PST ADULT - NS MD HP PULSE RADIAL
RN CLOSING NOTES



PATIENT REMAINS IN STABLE CONDITION THROUGHOUT SHIFT. PATIENT IN BED, ASLEEP,  ON O2 3L/MIN 
VIA N/C AND TOLERATING WELL. BREATHING EVEN AND UNLABORED. NO SIGNS OF ACUTE DISTRESS NOTED 
AT THE TIME.  IV ACCESS NOTED ON [facility] AND L WRIST #20G. INTACT AND PATENT. NO S/S OF 
INFILTRATIONS. NEW HD CATH ON RIGHT CHEST WALL, DRESSING INTACT AND DRY.  NO C/O PAIN OR 
DISCOMFORT. KEPT PATIENT CLEAN DRY AND COMFORTABLE. ALL SAFETY MEASURES IN PLACE, BED ALARM 
ON. SIDE RAILS UP X3, BED IN THE LOWEST POSITION AND LOCKED. PLACE CALL LIGHT WITHIN REACH.  
ENDORSED TO NIGHT SHIFT NURSE FOR CONTINUITY OF CARE. right normal/left normal

## 2022-05-03 ENCOUNTER — OUTPATIENT (OUTPATIENT)
Dept: OUTPATIENT SERVICES | Facility: HOSPITAL | Age: 62
LOS: 1 days | Discharge: HOME | End: 2022-05-03
Payer: MEDICARE

## 2022-05-03 ENCOUNTER — RESULT REVIEW (OUTPATIENT)
Age: 62
End: 2022-05-03

## 2022-05-03 VITALS
TEMPERATURE: 98 F | RESPIRATION RATE: 14 BRPM | OXYGEN SATURATION: 100 % | DIASTOLIC BLOOD PRESSURE: 88 MMHG | SYSTOLIC BLOOD PRESSURE: 136 MMHG | WEIGHT: 173.06 LBS | HEART RATE: 77 BPM | HEIGHT: 66 IN

## 2022-05-03 DIAGNOSIS — Z98.890 OTHER SPECIFIED POSTPROCEDURAL STATES: Chronic | ICD-10-CM

## 2022-05-03 DIAGNOSIS — Z01.818 ENCOUNTER FOR OTHER PREPROCEDURAL EXAMINATION: ICD-10-CM

## 2022-05-03 DIAGNOSIS — K63.5 POLYP OF COLON: Chronic | ICD-10-CM

## 2022-05-03 DIAGNOSIS — M16.11 UNILATERAL PRIMARY OSTEOARTHRITIS, RIGHT HIP: ICD-10-CM

## 2022-05-03 DIAGNOSIS — Z96.652 PRESENCE OF LEFT ARTIFICIAL KNEE JOINT: Chronic | ICD-10-CM

## 2022-05-03 LAB
A1C WITH ESTIMATED AVERAGE GLUCOSE RESULT: 5.1 % — SIGNIFICANT CHANGE UP (ref 4–5.6)
ALBUMIN SERPL ELPH-MCNC: 4.1 G/DL — SIGNIFICANT CHANGE UP (ref 3.5–5.2)
ALP SERPL-CCNC: 86 U/L — SIGNIFICANT CHANGE UP (ref 30–115)
ALT FLD-CCNC: 11 U/L — SIGNIFICANT CHANGE UP (ref 0–41)
ANION GAP SERPL CALC-SCNC: 10 MMOL/L — SIGNIFICANT CHANGE UP (ref 7–14)
APTT BLD: 41.4 SEC — HIGH (ref 27–39.2)
AST SERPL-CCNC: 21 U/L — SIGNIFICANT CHANGE UP (ref 0–41)
BASOPHILS # BLD AUTO: 0.04 K/UL — SIGNIFICANT CHANGE UP (ref 0–0.2)
BASOPHILS NFR BLD AUTO: 1.4 % — HIGH (ref 0–1)
BILIRUB SERPL-MCNC: 0.3 MG/DL — SIGNIFICANT CHANGE UP (ref 0.2–1.2)
BLD GP AB SCN SERPL QL: SIGNIFICANT CHANGE UP
BUN SERPL-MCNC: 23 MG/DL — HIGH (ref 10–20)
CALCIUM SERPL-MCNC: 9.5 MG/DL — SIGNIFICANT CHANGE UP (ref 8.5–10.1)
CHLORIDE SERPL-SCNC: 106 MMOL/L — SIGNIFICANT CHANGE UP (ref 98–110)
CO2 SERPL-SCNC: 23 MMOL/L — SIGNIFICANT CHANGE UP (ref 17–32)
CREAT SERPL-MCNC: 1.1 MG/DL — SIGNIFICANT CHANGE UP (ref 0.7–1.5)
EGFR: 58 ML/MIN/1.73M2 — LOW
EOSINOPHIL # BLD AUTO: 0.11 K/UL — SIGNIFICANT CHANGE UP (ref 0–0.7)
EOSINOPHIL NFR BLD AUTO: 3.8 % — SIGNIFICANT CHANGE UP (ref 0–8)
ESTIMATED AVERAGE GLUCOSE: 100 MG/DL — SIGNIFICANT CHANGE UP (ref 68–114)
GLUCOSE SERPL-MCNC: 99 MG/DL — SIGNIFICANT CHANGE UP (ref 70–99)
HCT VFR BLD CALC: 30.5 % — LOW (ref 37–47)
HGB BLD-MCNC: 10 G/DL — LOW (ref 12–16)
IMM GRANULOCYTES NFR BLD AUTO: 0.3 % — SIGNIFICANT CHANGE UP (ref 0.1–0.3)
INR BLD: 0.93 RATIO — SIGNIFICANT CHANGE UP (ref 0.65–1.3)
LYMPHOCYTES # BLD AUTO: 0.79 K/UL — LOW (ref 1.2–3.4)
LYMPHOCYTES # BLD AUTO: 27.5 % — SIGNIFICANT CHANGE UP (ref 20.5–51.1)
MCHC RBC-ENTMCNC: 31.3 PG — HIGH (ref 27–31)
MCHC RBC-ENTMCNC: 32.8 G/DL — SIGNIFICANT CHANGE UP (ref 32–37)
MCV RBC AUTO: 95.3 FL — SIGNIFICANT CHANGE UP (ref 81–99)
MONOCYTES # BLD AUTO: 0.38 K/UL — SIGNIFICANT CHANGE UP (ref 0.1–0.6)
MONOCYTES NFR BLD AUTO: 13.2 % — HIGH (ref 1.7–9.3)
MRSA PCR RESULT.: NEGATIVE — SIGNIFICANT CHANGE UP
NEUTROPHILS # BLD AUTO: 1.54 K/UL — SIGNIFICANT CHANGE UP (ref 1.4–6.5)
NEUTROPHILS NFR BLD AUTO: 53.8 % — SIGNIFICANT CHANGE UP (ref 42.2–75.2)
NRBC # BLD: 0 /100 WBCS — SIGNIFICANT CHANGE UP (ref 0–0)
PLATELET # BLD AUTO: 251 K/UL — SIGNIFICANT CHANGE UP (ref 130–400)
POTASSIUM SERPL-MCNC: 5 MMOL/L — SIGNIFICANT CHANGE UP (ref 3.5–5)
POTASSIUM SERPL-SCNC: 5 MMOL/L — SIGNIFICANT CHANGE UP (ref 3.5–5)
PROT SERPL-MCNC: 6.3 G/DL — SIGNIFICANT CHANGE UP (ref 6–8)
PROTHROM AB SERPL-ACNC: 10.7 SEC — SIGNIFICANT CHANGE UP (ref 9.95–12.87)
RBC # BLD: 3.2 M/UL — LOW (ref 4.2–5.4)
RBC # FLD: 13.2 % — SIGNIFICANT CHANGE UP (ref 11.5–14.5)
SODIUM SERPL-SCNC: 139 MMOL/L — SIGNIFICANT CHANGE UP (ref 135–146)
WBC # BLD: 2.87 K/UL — LOW (ref 4.8–10.8)
WBC # FLD AUTO: 2.87 K/UL — LOW (ref 4.8–10.8)

## 2022-05-03 PROCEDURE — 73502 X-RAY EXAM HIP UNI 2-3 VIEWS: CPT | Mod: 26,RT

## 2022-05-03 PROCEDURE — 93010 ELECTROCARDIOGRAM REPORT: CPT

## 2022-05-03 RX ORDER — CYCLOBENZAPRINE HYDROCHLORIDE 10 MG/1
1 TABLET, FILM COATED ORAL
Qty: 0 | Refills: 0 | DISCHARGE

## 2022-05-03 NOTE — H&P PST ADULT - NSICDXPASTMEDICALHX_GEN_ALL_CORE_FT
PAST MEDICAL HISTORY:  Anemia     Anxiety     Depression no suicide ideation    Fibromyalgia     History of chemotherapy for RA [id number]    History of food poisoning deliberate    OA (osteoarthritis)     Obesity     PNA (pneumonia)     PTSD (post-traumatic stress disorder)     Rheumatoid arthritis     Sciatica ?

## 2022-05-03 NOTE — H&P PST ADULT - CARDIOVASCULAR
Report to 5th floor.      [address] [place] [address]  08/17/19 2528 Regular rate & rhythm, normal S1, S2; no murmurs, gallops or rubs; no S3, S4

## 2022-05-03 NOTE — H&P PST ADULT - NSICDXPASTSURGICALHX_GEN_ALL_CORE_FT
PAST SURGICAL HISTORY:  Colon polyp removed 2019    History of cholecystectomy 2009    History of dilatation and curettage April 2018    History of ovarian cystectomy Right side by vertical insion    History of surgery repaired left achilles tendon 6/2018    History of surgery gastric bypass 2003    History of total knee replacement, left 3/31/21    S/P left knee arthroscopy bilateral knee arthroscopy 2015

## 2022-05-03 NOTE — H&P PST ADULT - REASON FOR ADMISSION
Case Type: OP Block TimeSuite: OR SouthProceduralist: [place] Shields  Confirmed Surgery DateTime: 05- - 0:00PAST DateTime: 05- - 7:15Procedure: RIGHT TOTAL HIP REPLACEMENT  Laterality: RightLength of Procedure: 120 MinutesAnesthesia Type: Regional

## 2022-05-03 NOTE — H&P PST ADULT - HISTORY OF PRESENT ILLNESS
61y Female presents today for presurgical testing for RIGHT TOTAL HIP REPLACEMENT. Patient reports progressively worsening right hip pain 10/10 on pain scale, for well over 7 years. She states that the pain is sharp and throbbing and wakes her from her sleep. She endorses not being able to sleep well since 11/2021 due to the severe, constant pain. She states that the prescribed medications help mildly, but that ultimately she is always in pain. She denies any trauma or injury at the time of initial onset of pain. She offers no other complaints at this time.   Patient denies any CP, palpitations, SOB, cough, or dysuria. No recent URI or UTI.  Stated exercise tolerance is FOS 5, limited due to hip pain  TING screen reviewed    Patient denies any recent personal exposure to COVID19. Denies any sick contacts. Patient denies travel within the past 30 days. Patient was instructed to quarantine until after procedure.    Anesthesia Alert  NO--Difficult Airway  NO--History of neck surgery or radiation  NO--Limited ROM of neck  NO--History of Malignant hyperthermia  NO--Personal or family history of Pseudocholinesterase deficiency.  NO--Prior Anesthesia Complication  NO--Latex Allergy  NO--Loose teeth  YES--History of Rheumatoid Arthritis - GOOD ROM NECK  NO--TING  NO--Bleeding risk  NO--Other_____    Patient states that this is their complete medical history and list of medications.  Patient verbalized understanding of instructions given during this visit and was given the opportunity to ask questions and have them answered. They were instructed to follow up with their surgeon/surgeon's office with any questions regarding their procedure.

## 2022-05-03 NOTE — H&P PST ADULT - NSICDXFAMILYHX_GEN_ALL_CORE_FT
FAMILY HISTORY:  FHx: arthritis, Father  FHx: colon cancer, Mother, grandmother  FHx: diabetes mellitus, Mother  FHx: hypertension, Mother

## 2022-05-17 PROBLEM — J18.9 PNEUMONIA, UNSPECIFIED ORGANISM: Chronic | Status: ACTIVE | Noted: 2022-05-03

## 2022-05-17 PROBLEM — M54.30 SCIATICA, UNSPECIFIED SIDE: Chronic | Status: ACTIVE | Noted: 2018-11-19

## 2022-05-17 PROBLEM — Z91.89 OTHER SPECIFIED PERSONAL RISK FACTORS, NOT ELSEWHERE CLASSIFIED: Chronic | Status: ACTIVE | Noted: 2021-03-16

## 2022-05-17 PROBLEM — Z92.21 PERSONAL HISTORY OF ANTINEOPLASTIC CHEMOTHERAPY: Chronic | Status: ACTIVE | Noted: 2022-05-03

## 2022-06-08 ENCOUNTER — RESULT REVIEW (OUTPATIENT)
Age: 62
End: 2022-06-08

## 2022-06-08 ENCOUNTER — TRANSCRIPTION ENCOUNTER (OUTPATIENT)
Age: 62
End: 2022-06-08

## 2022-06-08 ENCOUNTER — INPATIENT (INPATIENT)
Facility: HOSPITAL | Age: 62
LOS: 0 days | Discharge: HOME HEALTH | End: 2022-06-09
Attending: ORTHOPAEDIC SURGERY | Admitting: ORTHOPAEDIC SURGERY
Payer: MEDICARE

## 2022-06-08 VITALS
OXYGEN SATURATION: 99 % | RESPIRATION RATE: 17 BRPM | DIASTOLIC BLOOD PRESSURE: 72 MMHG | TEMPERATURE: 97 F | HEIGHT: 66 IN | HEART RATE: 76 BPM | SYSTOLIC BLOOD PRESSURE: 121 MMHG | WEIGHT: 167.99 LBS

## 2022-06-08 DIAGNOSIS — Z98.890 OTHER SPECIFIED POSTPROCEDURAL STATES: Chronic | ICD-10-CM

## 2022-06-08 DIAGNOSIS — Z96.652 PRESENCE OF LEFT ARTIFICIAL KNEE JOINT: Chronic | ICD-10-CM

## 2022-06-08 DIAGNOSIS — M16.9 OSTEOARTHRITIS OF HIP, UNSPECIFIED: ICD-10-CM

## 2022-06-08 DIAGNOSIS — K63.5 POLYP OF COLON: Chronic | ICD-10-CM

## 2022-06-08 LAB
BLD GP AB SCN SERPL QL: SIGNIFICANT CHANGE UP
GLUCOSE BLDC GLUCOMTR-MCNC: 79 MG/DL — SIGNIFICANT CHANGE UP (ref 70–99)
GLUCOSE BLDC GLUCOMTR-MCNC: 93 MG/DL — SIGNIFICANT CHANGE UP (ref 70–99)
HCT VFR BLD CALC: 31.4 % — LOW (ref 37–47)
HGB BLD-MCNC: 10.2 G/DL — LOW (ref 12–16)
MCHC RBC-ENTMCNC: 30.7 PG — SIGNIFICANT CHANGE UP (ref 27–31)
MCHC RBC-ENTMCNC: 32.5 G/DL — SIGNIFICANT CHANGE UP (ref 32–37)
MCV RBC AUTO: 94.6 FL — SIGNIFICANT CHANGE UP (ref 81–99)
NRBC # BLD: 0 /100 WBCS — SIGNIFICANT CHANGE UP (ref 0–0)
PLATELET # BLD AUTO: 202 K/UL — SIGNIFICANT CHANGE UP (ref 130–400)
RBC # BLD: 3.32 M/UL — LOW (ref 4.2–5.4)
RBC # FLD: 12.7 % — SIGNIFICANT CHANGE UP (ref 11.5–14.5)
WBC # BLD: 5.05 K/UL — SIGNIFICANT CHANGE UP (ref 4.8–10.8)
WBC # FLD AUTO: 5.05 K/UL — SIGNIFICANT CHANGE UP (ref 4.8–10.8)

## 2022-06-08 PROCEDURE — 73501 X-RAY EXAM HIP UNI 1 VIEW: CPT | Mod: 26,RT

## 2022-06-08 PROCEDURE — 88304 TISSUE EXAM BY PATHOLOGIST: CPT | Mod: 26

## 2022-06-08 PROCEDURE — 88311 DECALCIFY TISSUE: CPT | Mod: 26

## 2022-06-08 RX ORDER — ACETAMINOPHEN 500 MG
650 TABLET ORAL EVERY 6 HOURS
Refills: 0 | Status: DISCONTINUED | OUTPATIENT
Start: 2022-06-08 | End: 2022-06-09

## 2022-06-08 RX ORDER — SENNA PLUS 8.6 MG/1
2 TABLET ORAL AT BEDTIME
Refills: 0 | Status: DISCONTINUED | OUTPATIENT
Start: 2022-06-08 | End: 2022-06-09

## 2022-06-08 RX ORDER — CEFAZOLIN SODIUM 1 G
2000 VIAL (EA) INJECTION EVERY 8 HOURS
Refills: 0 | Status: COMPLETED | OUTPATIENT
Start: 2022-06-08 | End: 2022-06-08

## 2022-06-08 RX ORDER — CELECOXIB 200 MG/1
200 CAPSULE ORAL EVERY 12 HOURS
Refills: 0 | Status: DISCONTINUED | OUTPATIENT
Start: 2022-06-09 | End: 2022-06-09

## 2022-06-08 RX ORDER — SODIUM CHLORIDE 9 MG/ML
1000 INJECTION INTRAMUSCULAR; INTRAVENOUS; SUBCUTANEOUS
Refills: 0 | Status: DISCONTINUED | OUTPATIENT
Start: 2022-06-08 | End: 2022-06-09

## 2022-06-08 RX ORDER — KETOROLAC TROMETHAMINE 30 MG/ML
15 SYRINGE (ML) INJECTION EVERY 6 HOURS
Refills: 0 | Status: DISCONTINUED | OUTPATIENT
Start: 2022-06-08 | End: 2022-06-09

## 2022-06-08 RX ORDER — SODIUM CHLORIDE 9 MG/ML
1000 INJECTION, SOLUTION INTRAVENOUS
Refills: 0 | Status: DISCONTINUED | OUTPATIENT
Start: 2022-06-08 | End: 2022-06-08

## 2022-06-08 RX ORDER — ASPIRIN/CALCIUM CARB/MAGNESIUM 324 MG
81 TABLET ORAL
Refills: 0 | Status: DISCONTINUED | OUTPATIENT
Start: 2022-06-09 | End: 2022-06-09

## 2022-06-08 RX ORDER — ACETAMINOPHEN 500 MG
1000 TABLET ORAL ONCE
Refills: 0 | Status: DISCONTINUED | OUTPATIENT
Start: 2022-06-08 | End: 2022-06-08

## 2022-06-08 RX ORDER — ONDANSETRON 8 MG/1
4 TABLET, FILM COATED ORAL ONCE
Refills: 0 | Status: DISCONTINUED | OUTPATIENT
Start: 2022-06-08 | End: 2022-06-08

## 2022-06-08 RX ORDER — ONDANSETRON 8 MG/1
4 TABLET, FILM COATED ORAL EVERY 6 HOURS
Refills: 0 | Status: DISCONTINUED | OUTPATIENT
Start: 2022-06-08 | End: 2022-06-09

## 2022-06-08 RX ORDER — HYDROMORPHONE HYDROCHLORIDE 2 MG/ML
1 INJECTION INTRAMUSCULAR; INTRAVENOUS; SUBCUTANEOUS
Refills: 0 | Status: DISCONTINUED | OUTPATIENT
Start: 2022-06-08 | End: 2022-06-08

## 2022-06-08 RX ORDER — OXYCODONE HYDROCHLORIDE 5 MG/1
5 TABLET ORAL EVERY 4 HOURS
Refills: 0 | Status: DISCONTINUED | OUTPATIENT
Start: 2022-06-08 | End: 2022-06-09

## 2022-06-08 RX ORDER — CHLORHEXIDINE GLUCONATE 213 G/1000ML
1 SOLUTION TOPICAL
Refills: 0 | Status: DISCONTINUED | OUTPATIENT
Start: 2022-06-08 | End: 2022-06-09

## 2022-06-08 RX ORDER — DULOXETINE HYDROCHLORIDE 30 MG/1
60 CAPSULE, DELAYED RELEASE ORAL DAILY
Refills: 0 | Status: DISCONTINUED | OUTPATIENT
Start: 2022-06-08 | End: 2022-06-09

## 2022-06-08 RX ORDER — PANTOPRAZOLE SODIUM 20 MG/1
40 TABLET, DELAYED RELEASE ORAL
Refills: 0 | Status: DISCONTINUED | OUTPATIENT
Start: 2022-06-08 | End: 2022-06-09

## 2022-06-08 RX ORDER — PREGABALIN 225 MG/1
1000 CAPSULE ORAL ONCE
Refills: 0 | Status: COMPLETED | OUTPATIENT
Start: 2022-06-08 | End: 2022-06-08

## 2022-06-08 RX ORDER — HYDROMORPHONE HYDROCHLORIDE 2 MG/ML
0.5 INJECTION INTRAMUSCULAR; INTRAVENOUS; SUBCUTANEOUS
Refills: 0 | Status: DISCONTINUED | OUTPATIENT
Start: 2022-06-08 | End: 2022-06-08

## 2022-06-08 RX ORDER — TRAMADOL HYDROCHLORIDE 50 MG/1
50 TABLET ORAL EVERY 4 HOURS
Refills: 0 | Status: DISCONTINUED | OUTPATIENT
Start: 2022-06-08 | End: 2022-06-09

## 2022-06-08 RX ADMIN — OXYCODONE HYDROCHLORIDE 5 MILLIGRAM(S): 5 TABLET ORAL at 19:13

## 2022-06-08 RX ADMIN — Medication 100 MILLIGRAM(S): at 17:35

## 2022-06-08 RX ADMIN — PREGABALIN 1000 MICROGRAM(S): 225 CAPSULE ORAL at 20:14

## 2022-06-08 RX ADMIN — SODIUM CHLORIDE 100 MILLILITER(S): 9 INJECTION, SOLUTION INTRAVENOUS at 11:57

## 2022-06-08 RX ADMIN — SODIUM CHLORIDE 100 MILLILITER(S): 9 INJECTION INTRAMUSCULAR; INTRAVENOUS; SUBCUTANEOUS at 19:51

## 2022-06-08 RX ADMIN — Medication 15 MILLIGRAM(S): at 11:58

## 2022-06-08 RX ADMIN — Medication 15 MILLIGRAM(S): at 17:34

## 2022-06-08 RX ADMIN — Medication 100 MILLIGRAM(S): at 23:24

## 2022-06-08 RX ADMIN — OXYCODONE HYDROCHLORIDE 5 MILLIGRAM(S): 5 TABLET ORAL at 23:16

## 2022-06-08 RX ADMIN — Medication 650 MILLIGRAM(S): at 19:13

## 2022-06-08 RX ADMIN — DULOXETINE HYDROCHLORIDE 60 MILLIGRAM(S): 30 CAPSULE, DELAYED RELEASE ORAL at 11:57

## 2022-06-08 RX ADMIN — Medication 650 MILLIGRAM(S): at 23:25

## 2022-06-08 RX ADMIN — Medication 650 MILLIGRAM(S): at 11:57

## 2022-06-08 RX ADMIN — Medication 15 MILLIGRAM(S): at 23:24

## 2022-06-08 RX ADMIN — OXYCODONE HYDROCHLORIDE 5 MILLIGRAM(S): 5 TABLET ORAL at 17:33

## 2022-06-08 RX ADMIN — Medication 15 MILLIGRAM(S): at 19:13

## 2022-06-08 RX ADMIN — SENNA PLUS 2 TABLET(S): 8.6 TABLET ORAL at 21:23

## 2022-06-08 RX ADMIN — Medication 650 MILLIGRAM(S): at 17:34

## 2022-06-08 RX ADMIN — OXYCODONE HYDROCHLORIDE 5 MILLIGRAM(S): 5 TABLET ORAL at 22:31

## 2022-06-08 NOTE — CHART NOTE - NSCHARTNOTEFT_GEN_A_CORE
PACU ANESTHESIA ADMISSION NOTE      Procedure:   Post op diagnosis:      ____  Intubated  TV:______       Rate: ______      FiO2: ______    _x___  Patent Airway    _x___  Full return of protective reflexes    _x___  Full recovery from anesthesia / back to baseline status    Vitals:  T(C): 37  HR: 85  BP: 139/81  RR: 17   SpO2: 99%     Mental Status:  _x___ Awake   _____ Alert   _____ Drowsy   _____ Sedated    Nausea/Vomiting:  _x___  NO       ______Yes,   See Post - Op Orders         Pain Scale (0-10):  __0___    Treatment: _x___ None    ____ See Post - Op/PCA Orders    Post - Operative Fluids:   __x__ Oral   ____ See Post - Op Orders    Plan: Discharge:   ___Home       ___X_Floor     _____Critical Care    _____  Other:_________________    Comments:  No anesthesia issues or complications noted.  Discharge when criteria met.

## 2022-06-08 NOTE — DISCHARGE NOTE PROVIDER - CARE PROVIDER_API CALL
[place] Fitch)  Orthopaedic Surgery  [address] [place] [address]  Phone: [phone]  Fax: [phone]  Established Patient  Follow Up Time: Routine

## 2022-06-08 NOTE — PHYSICAL THERAPY INITIAL EVALUATION ADULT - ADDITIONAL COMMENTS
pt lives alone in an apartment with an elevator. There are no stairs. pt amb independently without AD.

## 2022-06-08 NOTE — ASU PATIENT PROFILE, ADULT - FALL HARM RISK - HARM RISK INTERVENTIONS

## 2022-06-08 NOTE — PROGRESS NOTE ADULT - SUBJECTIVE AND OBJECTIVE BOX
[facility]  POST OP CHECK     T(C): 36.7 (06-08-22 @ 11:54), Max: 36.7 (06-08-22 @ 11:54)  HR: 86 (06-08-22 @ 12:08) (72 - 88)  BP: 122/68 (06-08-22 @ 12:08) (121/72 - 154/73)  RR: 18 (06-08-22 @ 12:08) (14 - 20)  SpO2: 100% (06-08-22 @ 12:08) (99% - 100%)                        10.2   5.05  )-----------( 202      ( 08 Jun 2022 11:25 )             31.4       preop hgb 10.0              S/P [facility] ARTHROPLASTY  PAIN CONTROLLED  VSS   A&O X3  DRESSING Aquacell is in place   NVI  ANTIBIOTICS INFUSED  DVT PROPHYLAXIS ORDERED  ENCOURAGE INCENTIVE SPIROMETRY  AM LABS  REHAB TO BEGIN  POD 0  D/C PLANNING

## 2022-06-08 NOTE — BRIEF OPERATIVE NOTE - COMMENTS
Depuy actis stem dm cup  Direct Anterior  approach no specific hip precautions wbat asa for  dvt prophylaxis ,  pain cocktail given vanco powder above and below the fascia  total 2 grams

## 2022-06-08 NOTE — PATIENT PROFILE ADULT - FALL HARM RISK - HARM RISK INTERVENTIONS

## 2022-06-08 NOTE — ASU PATIENT PROFILE, ADULT - NSICDXPASTMEDICALHX_GEN_ALL_CORE_FT
PAST MEDICAL HISTORY:  Anemia     Anxiety     Depression no suicide ideation    Fibromyalgia     History of chemotherapy for RA [id number]    History of food poisoning deliberate    OA (osteoarthritis)     Obesity     PNA (pneumonia)     PTSD (post-traumatic stress disorder)     Rheumatoid arthritis     Sciatica ?

## 2022-06-08 NOTE — DISCHARGE NOTE PROVIDER - NSDCMRMEDTOKEN_GEN_ALL_CORE_FT
Calcium 600+D 600 mg-200 intl units oral tablet: orally 2 times a day  cyclobenzaprine 30 mg oral capsule, extended release: 1 cap(s) orally once a day (at bedtime)  DULoxetine 60 mg oral delayed release capsule: 1 cap(s) orally once a day (at bedtime)  indomethacin 25 mg oral capsule: 1 cap(s) orally 2 times a day  Protonix 40 mg oral delayed release tablet: 1 tab(s) orally 2 times a day   traMADol 100 mg oral tablet: 1 tab(s) orally every 6 hours, As Needed  Xeljanz XR 11 mg oral tablet, extended release: 1 tab(s) orally once a day YOU MAY RESUME THIS MEDICATION IN 2 WEEKS

## 2022-06-08 NOTE — PHYSICAL THERAPY INITIAL EVALUATION ADULT - MD/RN NOTIFIED
[facility] Hospitalist Progress Note    S:  Patient reports 1 episode of emesis    O:    Vitals:    Vital Last Value 24 Hour Range   Temperature 97.9 °F (36.6 °C) (10/15/21 2318) Temp  Min: 97.9 °F (36.6 °C)  Max: 99.3 °F (37.4 °C)   Pulse 93 (10/15/21 2318) Pulse  Min: 88  Max: 111   Respiratory 16 (10/15/21 2318) Resp  Min: 12  Max: 28   Non-Invasive  Blood Pressure 133/79 (10/15/21 2318) BP  Min: 109/66  Max: 133/79   Pulse Oximetry 94 % (10/15/21 2318) SpO2  Min: 91 %  Max: 100 %   Arterial   Blood Pressure   No data recorded      I/O last 3 completed shifts:  In: 703.7 [P.O.:527; I.V.:169.2; IV Piggyback:7.5]  Out: -   No intake/output data recorded.    Physical Exam:  GENERAL:  In no apparent distress.    HEAD:  No signs of head trauma.  EYES:  Pupils are equal.  Extraocular motions intact.    EARS:  Hearing grossly intact.  MOUTH:  Oropharynx is normal.   NECK:  No adenopathy, no JVD.     CHEST:  Chest with clear breath sounds bilaterally.  No wheezes, rales, or rhonchi.    CARDIAC:  Regular rate and rhythm.  S1 and S2, without murmurs, gallops, or rubs.  VASCULAR:  No Edema.  Peripheral pulses normal and equal in all extremities.  ABDOMEN:  Soft, RUQ and epigastric tenderness to palpation, + gibbs sign.  No sign of distention.  No   rebound or guarding, and no masses palpated.   Bowel Sounds normal.  MUSCULOSKELETAL:  Good range of motion of all major joints. Extremities without clubbing, cyanosis or edema.    NEUROLOGIC EXAM:  Alert and oriented x 3.  No focal sensory or strength deficits.   Speech normal.  Follows commands.  PSYCHIATRIC:  Mood normal.  SKIN:  No rash or lesions.       Current Facility-Administered Medications   Medication Dose Route Frequency Provider Last Rate Last Admin   • morphine injection 2 mg  2 mg Intravenous Q3H PRN Jordana Lion MD   2 mg at 10/15/21 0944   • [Held by provider] glimepiride (AMARYL) tablet 4 mg  4 mg Oral BID Lidia Boudreaux MD   4 mg at 10/13/21  2052   • [Held by provider] sitaGLIPtin (JANUVIA) tablet 100 mg  100 mg Oral Daily Lidia Boudreaux MD   100 mg at 10/13/21 1119   • ondansetron (ZOFRAN) injection 4 mg  4 mg Intravenous 4x Daily PRN Lidia Boudreaux MD       • melatonin tablet 3 mg  3 mg Oral Daily PRN Lidia Boudreaux MD       • magnesium hydroxide (MILK OF MAGNESIA) 400 MG/5ML suspension 30 mL  30 mL Oral Daily PRN Lidia Boudreaux MD       • senna (SENOKOT) 8.6 mg  1 tablet Oral Nightly PRN Lidia Boudreaux MD       • hydrALAZINE (APRESOLINE) tablet 50 mg  50 mg Oral Q6H PRN Lidia Boudreaux MD       • sodium chloride 0.9 % flush bag 25 mL  25 mL Intravenous PRN Lidia Boudreaux MD       • Potassium Standard Replacement Protocol   Does not apply See Admin Instructions Lidia Boudreaux MD       • Magnesium Standard Replacement Protocol   Does not apply See Admin Instructions Lidia Boudreaux MD       • Phosphorus Standard Replacement Protocol   Does not apply See Admin Instructions Lidia Boudreaux MD       • acetaminophen (TYLENOL) tablet 650 mg  650 mg Oral Q4H PRN Lidia Boudreaux MD       • aluminum-magnesium hydroxide-simethicone (MAALOX) 200-200-20 MG/5ML suspension 30 mL  30 mL Oral Q4H PRN Lidia Boudreaux MD       • dextrose 50 % injection 25 g  25 g Intravenous PRN Jordana Lion MD       • dextrose 50 % injection 12.5 g  12.5 g Intravenous PRN Jordana Lion MD       • glucagon (GLUCAGEN) injection 1 mg  1 mg Intramuscular PRN Jordana Lion MD       • dextrose (GLUTOSE) 40 % gel 15 g  15 g Oral PRN Jordana Lion MD       • dextrose (GLUTOSE) 40 % gel 30 g  30 g Oral PRN Jordana Lion MD       • insulin lispro (ADMELOG,HumaLOG) - Correction Dose   Subcutaneous TID WC Jordana Lion MD       • insulin lispro (ADMELOG,HumaLOG) - Correction Dose   Subcutaneous Nightly Jordana Lion MD        • atorvastatin (LIPITOR) tablet 40 mg  40 mg Oral Nightly Lidia Boudreaux MD   40 mg at 10/15/21 2132   • polyethylene glycol (MIRALAX) packet 17 g  17 g Oral BID Jordana Lion MD   17 g at 10/15/21 2132   • docusate sodium-sennosides (SENOKOT S) 50-8.6 MG 2 tablet  2 tablet Oral Nightly Jordana Lion MD   2 tablet at 10/15/21 2132   • lactated ringers infusion   Intravenous Continuous Jordana Lion MD 75 mL/hr at 10/14/21 0538 New Bag at 10/14/21 0538   • pantoprazole (PROTONIX INJECT) injection 40 mg  40 mg Intravenous 2 times per day Jordana Lion MD   40 mg at 10/15/21 2132       Diagnostic data:  Recent Results (from the past 48 hour(s))   Comprehensive Metabolic Panel    Collection Time: 10/14/21  4:39 AM   Result Value Ref Range    Fasting Status      Sodium 142 135 - 145 mmol/L    Potassium 3.5 3.4 - 5.1 mmol/L    Chloride 105 98 - 107 mmol/L    Carbon Dioxide 25 21 - 32 mmol/L    Anion Gap 16 10 - 20 mmol/L    Glucose 65 (L) 70 - 99 mg/dL    BUN 18 6 - 20 mg/dL    Creatinine 0.97 (H) 0.51 - 0.95 mg/dL    Glomerular Filtration Rate 71 >=60    BUN/ Creatinine Ratio 19 7 - 25    Calcium 8.0 (L) 8.4 - 10.2 mg/dL    Bilirubin, Total 0.2 0.2 - 1.0 mg/dL    GOT/AST 12 <=37 Units/L    GPT/ALT 10 <64 Units/L    Alkaline Phosphatase 104 45 - 117 Units/L    Albumin 2.1 (L) 3.6 - 5.1 g/dL    Protein, Total 6.1 (L) 6.4 - 8.2 g/dL    Globulin 4.0 2.0 - 4.0 g/dL    A/G Ratio 0.5 (L) 1.0 - 2.4   Magnesium    Collection Time: 10/14/21  4:39 AM   Result Value Ref Range    Magnesium 1.7 1.7 - 2.4 mg/dL   Phosphorus    Collection Time: 10/14/21  4:39 AM   Result Value Ref Range    Phosphorus 2.5 2.4 - 4.7 mg/dL   CBC with Automated Differential (performable only)    Collection Time: 10/14/21  4:39 AM   Result Value Ref Range    WBC 8.7 4.2 - 11.0 K/mcL    RBC 3.12 (L) 4.00 - 5.20 mil/mcL    HGB 9.7 (L) 12.0 - 15.5 g/dL    HCT 29.1 (L) 36.0 - 46.5 %    MCV 93.3  78.0 - 100.0 fl    MCH 31.1 26.0 - 34.0 pg    MCHC 33.3 32.0 - 36.5 g/dL    RDW-CV 13.2 11.0 - 15.0 %    RDW-SD 44.9 39.0 - 50.0 fL     (H) 140 - 450 K/mcL    NRBC 0 <=0 /100 WBC    Neutrophil, Percent 68 %    Lymphocytes, Percent 18 %    Mono, Percent 10 %    Eosinophils, Percent 2 %    Basophils, Percent 1 %    Immature Granulocytes 1 %    Absolute Neutrophils 6.0 1.8 - 7.7 K/mcL    Absolute Lymphocytes 1.5 1.0 - 4.0 K/mcL    Absolute Monocytes 0.8 0.3 - 0.9 K/mcL    Absolute Eosinophils  0.2 0.0 - 0.5 K/mcL    Absolute Basophils 0.1 0.0 - 0.3 K/mcL    Absolute Immmature Granulocytes 0.1 0.0 - 0.2 K/mcL   Troponin I Ultra Sensitive    Collection Time: 10/14/21  4:39 AM   Result Value Ref Range    Troponin I, Ultra Sensitive <0.02 <=0.04 ng/mL   GLUCOSE, BEDSIDE - POINT OF CARE    Collection Time: 10/14/21  6:20 AM   Result Value Ref Range    GLUCOSE, BEDSIDE - POINT OF CARE 55 (L) 70 - 99 mg/dL   GLUCOSE, BEDSIDE - POINT OF CARE    Collection Time: 10/14/21  6:41 AM   Result Value Ref Range    GLUCOSE, BEDSIDE - POINT OF CARE 68 (L) 70 - 99 mg/dL   GLUCOSE, BEDSIDE - POINT OF CARE    Collection Time: 10/14/21  7:02 AM   Result Value Ref Range    GLUCOSE, BEDSIDE - POINT OF CARE 125 (H) 70 - 99 mg/dL   CBC No Differential    Collection Time: 10/14/21  9:33 AM   Result Value Ref Range    WBC 9.0 4.2 - 11.0 K/mcL    RBC 3.04 (L) 4.00 - 5.20 mil/mcL    HGB 9.4 (L) 12.0 - 15.5 g/dL    HCT 28.2 (L) 36.0 - 46.5 %    MCV 92.8 78.0 - 100.0 fl    MCH 30.9 26.0 - 34.0 pg    MCHC 33.3 32.0 - 36.5 g/dL     (H) 140 - 450 K/mcL    RDW-CV 13.2 11.0 - 15.0 %    RDW-SD 44.6 39.0 - 50.0 fL    NRBC 0 <=0 /100 WBC   Electrocardiogram 12-Lead    Collection Time: 10/14/21 10:02 AM   Result Value Ref Range    Ventricular Rate EKG/Min (BPM) 101     Atrial Rate (BPM) 101     MI-Interval (MSEC) 136     QRS-Interval (MSEC) 80     QT-Interval (MSEC) 368     QTc 477     P Axis (Degrees) 73     R Axis (Degrees) -53     T Axis  (Degrees) 84     REPORT TEXT       Sinus tachycardia  with occasional  premature ventricular complexes  Left anterior fascicular block  Abnormal ECG  When compared with ECG of  12-OCT-2021 13:06,  premature ventricular complexes  are now  present  Criteria for  Septal infarct  are no longer  present  Nonspecific T wave abnormality no longer evident in  Lateral leads     GLUCOSE, BEDSIDE - POINT OF CARE    Collection Time: 10/14/21 12:06 PM   Result Value Ref Range    GLUCOSE, BEDSIDE - POINT OF CARE 138 (H) 70 - 99 mg/dL   CBC No Differential    Collection Time: 10/14/21  4:06 PM   Result Value Ref Range    WBC 9.7 4.2 - 11.0 K/mcL    RBC 2.88 (L) 4.00 - 5.20 mil/mcL    HGB 9.2 (L) 12.0 - 15.5 g/dL    HCT 27.2 (L) 36.0 - 46.5 %    MCV 94.4 78.0 - 100.0 fl    MCH 31.9 26.0 - 34.0 pg    MCHC 33.8 32.0 - 36.5 g/dL     140 - 450 K/mcL    RDW-CV 13.2 11.0 - 15.0 %    RDW-SD 45.3 39.0 - 50.0 fL    NRBC 0 <=0 /100 WBC   GLUCOSE, BEDSIDE - POINT OF CARE    Collection Time: 10/14/21  5:15 PM   Result Value Ref Range    GLUCOSE, BEDSIDE - POINT OF CARE 101 (H) 70 - 99 mg/dL   GLUCOSE, BEDSIDE - POINT OF CARE    Collection Time: 10/14/21  7:32 PM   Result Value Ref Range    GLUCOSE, BEDSIDE - POINT OF CARE 128 (H) 70 - 99 mg/dL   CBC No Differential    Collection Time: 10/14/21 10:21 PM   Result Value Ref Range    WBC 13.5 (H) 4.2 - 11.0 K/mcL    RBC 3.43 (L) 4.00 - 5.20 mil/mcL    HGB 10.9 (L) 12.0 - 15.5 g/dL    HCT 31.9 (L) 36.0 - 46.5 %    MCV 93.0 78.0 - 100.0 fl    MCH 31.8 26.0 - 34.0 pg    MCHC 34.2 32.0 - 36.5 g/dL     (H) 140 - 450 K/mcL    RDW-CV 13.2 11.0 - 15.0 %    RDW-SD 45.1 39.0 - 50.0 fL    NRBC 0 <=0 /100 WBC   GLUCOSE, BEDSIDE - POINT OF CARE    Collection Time: 10/15/21  4:27 AM   Result Value Ref Range    GLUCOSE, BEDSIDE - POINT OF CARE 90 70 - 99 mg/dL   CBC No Differential    Collection Time: 10/15/21  4:37 AM   Result Value Ref Range    WBC 8.8 4.2 - 11.0 K/mcL    RBC 3.27 (L) 4.00 -  5.20 mil/mcL    HGB 10.1 (L) 12.0 - 15.5 g/dL    HCT 30.5 (L) 36.0 - 46.5 %    MCV 93.3 78.0 - 100.0 fl    MCH 30.9 26.0 - 34.0 pg    MCHC 33.1 32.0 - 36.5 g/dL     (H) 140 - 450 K/mcL    RDW-CV 13.1 11.0 - 15.0 %    RDW-SD 44.5 39.0 - 50.0 fL    NRBC 0 <=0 /100 WBC   Magnesium    Collection Time: 10/15/21  4:37 AM   Result Value Ref Range    Magnesium 1.7 1.7 - 2.4 mg/dL   Comprehensive Metabolic Panel    Collection Time: 10/15/21  4:37 AM   Result Value Ref Range    Fasting Status      Sodium 141 135 - 145 mmol/L    Potassium 4.0 3.4 - 5.1 mmol/L    Chloride 104 98 - 107 mmol/L    Carbon Dioxide 23 21 - 32 mmol/L    Anion Gap 18 10 - 20 mmol/L    Glucose 90 70 - 99 mg/dL    BUN 11 6 - 20 mg/dL    Creatinine 0.99 (H) 0.51 - 0.95 mg/dL    Glomerular Filtration Rate 69 >=60    BUN/ Creatinine Ratio 11 7 - 25    Calcium 7.9 (L) 8.4 - 10.2 mg/dL    Bilirubin, Total 0.2 0.2 - 1.0 mg/dL    GOT/AST 18 <=37 Units/L    GPT/ALT 13 <64 Units/L    Alkaline Phosphatase 122 (H) 45 - 117 Units/L    Albumin 2.3 (L) 3.6 - 5.1 g/dL    Protein, Total 5.6 (L) 6.4 - 8.2 g/dL    Globulin 3.3 2.0 - 4.0 g/dL    A/G Ratio 0.7 (L) 1.0 - 2.4   GLUCOSE, BEDSIDE - POINT OF CARE    Collection Time: 10/15/21  7:19 AM   Result Value Ref Range    GLUCOSE, BEDSIDE - POINT OF CARE 93 70 - 99 mg/dL   CBC No Differential    Collection Time: 10/15/21 10:45 AM   Result Value Ref Range    WBC 8.4 4.2 - 11.0 K/mcL    RBC 2.94 (L) 4.00 - 5.20 mil/mcL    HGB 9.1 (L) 12.0 - 15.5 g/dL    HCT 27.7 (L) 36.0 - 46.5 %    MCV 94.2 78.0 - 100.0 fl    MCH 31.0 26.0 - 34.0 pg    MCHC 32.9 32.0 - 36.5 g/dL     140 - 450 K/mcL    RDW-CV 13.1 11.0 - 15.0 %    RDW-SD 44.6 39.0 - 50.0 fL    NRBC 0 <=0 /100 WBC   GLUCOSE, BEDSIDE - POINT OF CARE    Collection Time: 10/15/21 11:24 AM   Result Value Ref Range    GLUCOSE, BEDSIDE - POINT OF CARE 98 70 - 99 mg/dL   GLUCOSE, BEDSIDE - POINT OF CARE    Collection Time: 10/15/21  3:45 PM   Result Value Ref Range     GLUCOSE, BEDSIDE - POINT OF CARE 120 (H) 70 - 99 mg/dL   CBC No Differential    Collection Time: 10/15/21  4:47 PM   Result Value Ref Range    WBC 9.6 4.2 - 11.0 K/mcL    RBC 2.60 (L) 4.00 - 5.20 mil/mcL    HGB 8.3 (L) 12.0 - 15.5 g/dL    HCT 24.5 (L) 36.0 - 46.5 %    MCV 94.2 78.0 - 100.0 fl    MCH 31.9 26.0 - 34.0 pg    MCHC 33.9 32.0 - 36.5 g/dL     140 - 450 K/mcL    RDW-CV 13.2 11.0 - 15.0 %    RDW-SD 45.4 39.0 - 50.0 fL    NRBC 0 <=0 /100 WBC   GLUCOSE, BEDSIDE - POINT OF CARE    Collection Time: 10/15/21  7:17 PM   Result Value Ref Range    GLUCOSE, BEDSIDE - POINT OF CARE 198 (H) 70 - 99 mg/dL   ]  ?  Studies:  CT ABDOMEN PELVIS W CONTRAST    Result Date: 10/13/2021  Narrative: EXAM: CT abdomen pelvis with contrast CLINICAL INDICATION: Generalized abdomen pain COMPARISON:  CT abdomen pelvis 8/21/2019. TECHNIQUE:  Oral and 100 cc Omnipaque 300 IV contrast imaging of abdomen and pelvis Did a Tele-radiologist issue a preliminary report?:  No. FINDINGS:  No pneumoperitoneum. Extensive interstitial thickening in both lower lungs. Numerous centrilobular lucencies throughout the lower lungs.  Diffuse low bone density. Right SI joint sclerosis. Leftward deviation of lumbar spine relative to sacrum, mild lower thoracic levoscoliosis. Facet hypertrophy bilateral at L5-S1. No aggressive lytic or blastic lesion or acute fracture. Healed left rib fracture.  Obesity. No anasarca. Reasonably symmetric muscles. No ascites, pleural or pericardial effusion.  Mesentery unremarkable, peritoneum smooth. No lymphadenopathy. Slight aortic calcification. Heart size normal. Slight coronary artery calcification involving at least the left main. No aneurysm. Unremarkable opacification of major pelvic veins, IVC and portal vein.  GE junction normal. Stomach normal. Orally ingested contrast reaches distal small bowel. Large and small bowel nondistended. Appendix normal caliber. At least a few left colon diverticula but no  distinct mural thickening.  Kidneys normal. Ureters nondistended. Urinary bladder posterior sediment or focal mural thickening up to 4-5 mm. Uterus normal size with 5 mm calcification intramural. Adnexa unremarkable.  Spleen normal. Adrenals normal. Pancreas normal. Gallbladder mild dilatation, normal wall thickness, homogeneous internal fluid content, no adjacent fluid. Bile ducts nondistended. Liver size and contour are normal. No focal intrahepatic lesion.     Impression: 1. New bladder is sediment or posterior wall thickening. Recommend bladder ultrasound. Considerations include cystitis and bladder neoplasm. 2. Atherosclerosis with coronary involvement. 3. Colon diverticulosis. 4. Potential osteoporosis or osteopenia. Recommend elective DEXA. 5. Degenerative changes of spine and right SI joint 6. Very small calcified uterine fibroid. 7. Pulmonary emphysema. 8. Increasing fibrosis or atelectasis at lung bases. Electronically Signed by: PEDRITO WEEKS M.D. Signed on: 10/13/2021 1:02 AM     US GALLBLADDER    Result Date: 10/13/2021  Narrative: EXAM: US GALLBLADDER CLINICAL INDICATION: Right upper quadrant abdominal pain.  Nausea and vomiting. COMPARISON:  Gallbladder ultrasound on 10/05/2021.  CT abdomen/pelvis on 10/13/2021. TECHNIQUE: Limited ultrasound including grayscale and color images were obtained of the gallbladder. FINDINGS: The gallbladder appears unremarkable.  There is no cholelithiasis.  No gallbladder wall thickening or pericholecystic fluid is seen.  The sonographic Lawrence's sign is negative per the ultrasound technologist.. The common bile duct measures 2 mm. Partially included pancreas is grossly unremarkable.  The remainder the pancreas is obscured due to overlying bowel gas. Visualized portions of the liver demonstrates grossly homogeneous echotexture. The right kidney measures 11 x 3.7 x 3.8 cm     Impression: No sonographic evidence of cholelithiasis or acute cholecystitis. FOR PHYSICIAN USE ONLY  - Please note that this report was generated using voice recognition software.  If you require clarification or feel that there has been an error in this report please contact me through [employer].  Thank you very much for allowing me to participate in the care of your patient. Electronically Signed by: ANGELY ZANDER Signed on: 10/13/2021 7:59 PM     US GALLBLADDER    Result Date: 10/5/2021  Narrative: EXAM: US GALLBLADDER CLINICAL INDICATION: 67-year-old female with right upper quadrant pain. COMPARISON: Nothing prior for comparison. FINDINGS: The visualized portions of the pancreas are unremarkable; the tail is obscured by bowel gas. The liver is normal in size and echogenicity without intrahepatic biliary ductal dilatation.  The liver measures 12.3 cm in length.  The portal vein is patent and hepatopetal. The gallbladder has a normal sonographic appearance without gallbladder stones, gallbladder wall thickening, or pericholecystic fluid. Sonographic Lawrence's sign is negative. There is no extrahepatic biliary ductal dilatation, the visualized common bile duct measures 5 mm. Limited images of the right kidney do not demonstrate any hydronephrosis..     Impression: Unremarkable sonographic appearance of the visualized right upper quadrant. FOR PHYSICIAN USE ONLY - Please note that this report was generated using voice recognition software.  If you require clarification or feel that there has been an error in this report please contact me through [employer].  Thank you very much for allowing me to participate in the care of your patient.  Electronically Signed by: ABBEY CHUN M.D. Signed on: 10/5/2021 5:42 PM     XR CHEST PA OR AP 1 VIEW    Result Date: 10/13/2021  Narrative: EXAM: XR CHEST PA OR AP 1 VIEW. CLINICAL INDICATION:  Shortness of breath. COMPARISON:  10/05/2021 and previous.     Impression: FINDINGS/IMPRESSION: Bilateral lung opacities are seen in a similar distribution to previous  10/05/2021.  No new opacity is seen.  Correlate for recent or persistent pneumonia.   Heart size is normal.  There is atherosclerosis of the aorta. Bony structures appear stable. Electronically Signed by: ALIA SHELTON M.D. Signed on: 10/13/2021 6:31 AM     XR CHEST PA AND LATERAL 2 VIEWS    Result Date: 10/5/2021  Narrative: EXAM: XR CHEST PA AND LATERAL 2 VIEWS CLINICAL INDICATION: 67-year-old female with chest pain COMPARISON: 09/07/2021. FINDINGS: There is mild atherosclerotic calcification of the aortic arch. Cardiomediastinal silhouette and pulmonary vasculature are otherwise unremarkable.  There is mild to moderate patchy and streaky airspace disease throughout the lungs.  No focal infiltrate is seen.  There is trace pleural thickening/effusion.  There is no pneumothorax.  The central tracheal bronchial tree is patent.  There is mild to moderate degenerative spurring visualized spine.     Impression: 1.   Bilateral airspace disease which can be seen with chronic opacities or infection/inflammation including Covid 19 pneumonia.  Opacities are slightly more pronounced than on the previous radiograph. 2.    Trace left pleural effusion or pleural thickening. 3.   Atherosclerosis. FOR PHYSICIAN USE ONLY - Please note that this report was generated using voice recognition software.  If you require clarification or feel that there has been an error in this report please contact me through [employer].  Thank you very much for allowing me to participate in the care of your patient. Electronically Signed by: ABBEY CHUN M.D. Signed on: 10/5/2021 11:06 AM        Cardiac studies:   Encounter Date: 10/12/21   Electrocardiogram 12-Lead   Result Value    Ventricular Rate EKG/Min (BPM) 101    Atrial Rate (BPM) 101    VA-Interval (MSEC) 136    QRS-Interval (MSEC) 80    QT-Interval (MSEC) 368    QTc 477    P Axis (Degrees) 73    R Axis (Degrees) -53    T Axis (Degrees) 84    REPORT TEXT      Sinus tachycardia  with  occasional  premature ventricular complexes  Left anterior fascicular block  Abnormal ECG  When compared with ECG of  12-OCT-2021 13:06,  premature ventricular complexes  are now  present  Criteria for  Septal infarct  are no longer  present  Nonspecific T wave abnormality no longer evident in  Lateral leads         ASSESSMENT/PLAN:    # Coffee-ground emesis  # Normocytic anemia  # Abdominal pain   Presented due to episode of black emesis at home. Hgb 11.6 -> 9.6. Baseline 12-14. Epigastric and RUQ tenderness on exam with positive gibbs sign concerning for acute cholecystitis. LFTs unremarkable. CT abd/pelvis with mild dilatation of gallbladder, no stones or biliary dilatation. Acute cholecystitis would not explain the drop in Hgb. Concern for PUD.   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 On repeat colonoscopy in 09/2019, no residual evidence of GIST, resected 6 mm tubular adenoma in sigmoid colon.      # Type 2 DM c/b diabetic nephropathy  Last A1c 7.8 (9/8/21). Home meds include sitagliptin, glimepiride.   - hold home meds  - medium dose ISS, accuchecks     # CKD stage III  Baseline cr ~1.1 - 1.3. Currently at baseline.   - monitor renal function  - avoid nephrotoxic agents     # Hypomagnesemia  Due to GI losses from persistent emesis.   - monitor and replete as needed     # HTN  Home meds amlodipine 10 mg daily, lisinopril 20 mg daily, and metoprolol XL 25 mg daily.   - hold home meds in setting of orthostatic hypotension     # HLD  Home med simvastatin 40 mg daily  - atorvastatin 40 mg daily while inpatient.     DVT Prophylaxis  SCDs    VTE Prophylaxis:SCD  GI prophylaxis: pantoprazole      Fluid: none  Electrolytes repleted PRN  Nutrition:    Code Status:FULL  Discharge plan  Disposition:  pending clinical improvement    PCP: MD Jordana Michel MD  [facility] Hospitalist  Please message through [employer]     This note was generated in part using \"Dragon\" voice recognition technology. The report was reviewed by this physician, but still may have unintentional errors due to inherent limitations of voice recognition technology.     yes

## 2022-06-08 NOTE — DISCHARGE NOTE PROVIDER - HOSPITAL COURSE
routine post op course s/p Total Hip Arthroplasty 61 year old female with a past medical history of depression, anxiety, fibromyalgia, rheumatoid arthritis and gerd was admitted for an elective Total Hip Arthroplasty. The patient tolerated surgery well with no intra/post operative complications. She was given intra/post operative antibiotics for infection prophylaxis and will be discharged on Aspirin 81mg BID for 35 days to lower the risk of blood clots. She worked with Physical Therapy while admitted to the hospital and is stable for discharge.

## 2022-06-08 NOTE — DISCHARGE NOTE PROVIDER - NSDCCPCAREPLAN_GEN_ALL_CORE_FT
PRINCIPAL DISCHARGE DIAGNOSIS  Diagnosis: OA (osteoarthritis)  Assessment and Plan of Treatment:        PRINCIPAL DISCHARGE DIAGNOSIS  Diagnosis: OA (osteoarthritis)  Assessment and Plan of Treatment: Keep surgical site clean and dry, may remove dressing in 7  days . Call your surgeon if any wound drainage, redness , increasing pain, fevers over 101 or if you have any questions or concerns.  Ice pack to affected area q4-6h as needed   You may shower with the bandage on and once it is removed. Once it is removed  , do not scrub surgical site. Do not apply any lotions/moisturizers/creams to surgical site.  Call to make your  post op appointment if you do not have one already.   Do not resume Xelrobinz until approved by Dr Rick

## 2022-06-08 NOTE — ASU PREOP CHECKLIST - TEMPERATURE IN CELSIUS (DEGREES C)
Otolaryngologist Procedure Text (A): After obtaining clear surgical margins the patient was sent to otolaryngology for surgical repair.  The patient understands they will receive post-surgical care and follow-up from the referring physician's office. 36.1

## 2022-06-08 NOTE — PHYSICAL THERAPY INITIAL EVALUATION ADULT - GENERAL OBSERVATIONS, REHAB EVAL
14;15-14;40 pt received semifowler in bed, NAD, +hep lock, +sequentials B LE, R hip aquacell dressing in tact, +call bell within reach, bed side table at reach. SURJIT Barrios is aware. 14;15-14;40 pt received semifowler in bed, NAD, +hep lock, +sequentials B LE, R hip aquacell dressing in tact, +call bell within reach, bed side table at reach. RN Quentin is aware.

## 2022-06-08 NOTE — DISCHARGE NOTE PROVIDER - NSDCFUSCHEDAPPT_GEN_ALL_CORE_FT
Jose Edward  [facility] Physician [facility]  ORTHOSURG [id number]  Scheduled Appointment: 07/13/2022

## 2022-06-09 ENCOUNTER — FORM ENCOUNTER (OUTPATIENT)
Age: 62
End: 2022-06-09

## 2022-06-09 ENCOUNTER — TRANSCRIPTION ENCOUNTER (OUTPATIENT)
Age: 62
End: 2022-06-09

## 2022-06-09 VITALS — DIASTOLIC BLOOD PRESSURE: 62 MMHG | HEART RATE: 85 BPM | SYSTOLIC BLOOD PRESSURE: 117 MMHG

## 2022-06-09 LAB
ANION GAP SERPL CALC-SCNC: 12 MMOL/L — SIGNIFICANT CHANGE UP (ref 7–14)
BUN SERPL-MCNC: 23 MG/DL — HIGH (ref 10–20)
CALCIUM SERPL-MCNC: 8.6 MG/DL — SIGNIFICANT CHANGE UP (ref 8.5–10.1)
CHLORIDE SERPL-SCNC: 105 MMOL/L — SIGNIFICANT CHANGE UP (ref 98–110)
CO2 SERPL-SCNC: 22 MMOL/L — SIGNIFICANT CHANGE UP (ref 17–32)
CREAT SERPL-MCNC: 1.2 MG/DL — SIGNIFICANT CHANGE UP (ref 0.7–1.5)
EGFR: 52 ML/MIN/1.73M2 — LOW
GLUCOSE SERPL-MCNC: 116 MG/DL — HIGH (ref 70–99)
HCT VFR BLD CALC: 25 % — LOW (ref 37–47)
HGB BLD-MCNC: 8.3 G/DL — LOW (ref 12–16)
MCHC RBC-ENTMCNC: 30.9 PG — SIGNIFICANT CHANGE UP (ref 27–31)
MCHC RBC-ENTMCNC: 33.2 G/DL — SIGNIFICANT CHANGE UP (ref 32–37)
MCV RBC AUTO: 92.9 FL — SIGNIFICANT CHANGE UP (ref 81–99)
NRBC # BLD: 0 /100 WBCS — SIGNIFICANT CHANGE UP (ref 0–0)
PLATELET # BLD AUTO: 180 K/UL — SIGNIFICANT CHANGE UP (ref 130–400)
POTASSIUM SERPL-MCNC: 4.1 MMOL/L — SIGNIFICANT CHANGE UP (ref 3.5–5)
POTASSIUM SERPL-SCNC: 4.1 MMOL/L — SIGNIFICANT CHANGE UP (ref 3.5–5)
RBC # BLD: 2.69 M/UL — LOW (ref 4.2–5.4)
RBC # FLD: 12.7 % — SIGNIFICANT CHANGE UP (ref 11.5–14.5)
SODIUM SERPL-SCNC: 139 MMOL/L — SIGNIFICANT CHANGE UP (ref 135–146)
WBC # BLD: 3.61 K/UL — LOW (ref 4.8–10.8)
WBC # FLD AUTO: 3.61 K/UL — LOW (ref 4.8–10.8)

## 2022-06-09 RX ORDER — ASPIRIN/CALCIUM CARB/MAGNESIUM 324 MG
1 TABLET ORAL
Qty: 70 | Refills: 0
Start: 2022-06-09 | End: 2022-07-13

## 2022-06-09 RX ORDER — OXYCODONE HYDROCHLORIDE 5 MG/1
1 TABLET ORAL
Qty: 40 | Refills: 0
Start: 2022-06-09

## 2022-06-09 RX ORDER — SODIUM CHLORIDE 9 MG/ML
1000 INJECTION INTRAMUSCULAR; INTRAVENOUS; SUBCUTANEOUS ONCE
Refills: 0 | Status: COMPLETED | OUTPATIENT
Start: 2022-06-09 | End: 2022-06-09

## 2022-06-09 RX ORDER — CELECOXIB 200 MG/1
200 CAPSULE ORAL DAILY
Refills: 0 | Status: DISCONTINUED | OUTPATIENT
Start: 2022-06-10 | End: 2022-06-09

## 2022-06-09 RX ORDER — SENNA PLUS 8.6 MG/1
2 TABLET ORAL
Qty: 0 | Refills: 0 | DISCHARGE
Start: 2022-06-09

## 2022-06-09 RX ADMIN — Medication 650 MILLIGRAM(S): at 06:07

## 2022-06-09 RX ADMIN — Medication 650 MILLIGRAM(S): at 00:00

## 2022-06-09 RX ADMIN — Medication 650 MILLIGRAM(S): at 05:32

## 2022-06-09 RX ADMIN — Medication 650 MILLIGRAM(S): at 11:29

## 2022-06-09 RX ADMIN — Medication 15 MILLIGRAM(S): at 06:07

## 2022-06-09 RX ADMIN — DULOXETINE HYDROCHLORIDE 60 MILLIGRAM(S): 30 CAPSULE, DELAYED RELEASE ORAL at 11:31

## 2022-06-09 RX ADMIN — CELECOXIB 200 MILLIGRAM(S): 200 CAPSULE ORAL at 05:32

## 2022-06-09 RX ADMIN — Medication 81 MILLIGRAM(S): at 05:32

## 2022-06-09 RX ADMIN — TRAMADOL HYDROCHLORIDE 50 MILLIGRAM(S): 50 TABLET ORAL at 11:32

## 2022-06-09 RX ADMIN — OXYCODONE HYDROCHLORIDE 5 MILLIGRAM(S): 5 TABLET ORAL at 13:35

## 2022-06-09 RX ADMIN — Medication 15 MILLIGRAM(S): at 00:00

## 2022-06-09 RX ADMIN — TRAMADOL HYDROCHLORIDE 50 MILLIGRAM(S): 50 TABLET ORAL at 10:57

## 2022-06-09 RX ADMIN — OXYCODONE HYDROCHLORIDE 5 MILLIGRAM(S): 5 TABLET ORAL at 16:39

## 2022-06-09 RX ADMIN — Medication 15 MILLIGRAM(S): at 05:33

## 2022-06-09 RX ADMIN — CELECOXIB 200 MILLIGRAM(S): 200 CAPSULE ORAL at 06:07

## 2022-06-09 RX ADMIN — PANTOPRAZOLE SODIUM 40 MILLIGRAM(S): 20 TABLET, DELAYED RELEASE ORAL at 05:33

## 2022-06-09 RX ADMIN — SODIUM CHLORIDE 1000 MILLILITER(S): 9 INJECTION INTRAMUSCULAR; INTRAVENOUS; SUBCUTANEOUS at 10:07

## 2022-06-09 NOTE — PROGRESS NOTE ADULT - SUBJECTIVE AND OBJECTIVE BOX
61y Female POD #   1   S/P right Total Hip Arthroplasty     Patient seen and examined at bedside . The patient is awake and alert in NAD. No complaints of chest pain, SOB, N/V.  Pain is controlled, the patient has ambulated and is voiding.     PAST MEDICAL & SURGICAL HISTORY:  Rheumatoid arthritis    OA (osteoarthritis)    Fibromyalgia    Depression  no suicide ideation    Obesity    Anxiety    PTSD (post-traumatic stress disorder)    Sciatica  ?    Anemia    History of food poisoning  deliberate    PNA (pneumonia)    History of chemotherapy  for RA [id number]    History of surgery  gastric bypass 2003    History of cholecystectomy  2009    S/P left knee arthroscopy  bilateral knee arthroscopy 2015    History of dilatation and curettage  April 2018    Colon polyp  removed 2019    History of surgery  repaired left achilles tendon 6/2018    History of ovarian cystectomy  Right side by vertical insion    History of total knee replacement, left  3/31/21          MEDICATIONS  (STANDING):  acetaminophen     Tablet .. 650 milliGRAM(s) Oral every 6 hours  aspirin enteric coated 81 milliGRAM(s) Oral two times a day  chlorhexidine 4% Liquid 1 Application(s) Topical <User Schedule>  DULoxetine 60 milliGRAM(s) Oral daily  pantoprazole    Tablet 40 milliGRAM(s) Oral before breakfast  senna 2 Tablet(s) Oral at bedtime    MEDICATIONS  (PRN):  aluminum hydroxide/magnesium hydroxide/simethicone Suspension 30 milliLiter(s) Oral four times a day PRN Indigestion  ondansetron Injectable 4 milliGRAM(s) IV Push every 6 hours PRN Nausea and/or Vomiting  oxyCODONE    IR 5 milliGRAM(s) Oral every 4 hours PRN breakthrough  traMADol 50 milliGRAM(s) Oral every 4 hours PRN Mild Pain (1 - 3)        Vital Signs Last 24 Hrs  T(C): 36.2 (09 Jun 2022 07:00), Max: 37.2 (08 Jun 2022 23:00)  T(F): 97.2 (09 Jun 2022 07:00), Max: 99 (08 Jun 2022 23:00)  HR: 80 (09 Jun 2022 07:00) (72 - 89)  BP: 107/55 (09 Jun 2022 07:00) (104/51 - 154/73)  BP(mean): --  RR: 16 (09 Jun 2022 07:00) (14 - 20)  SpO2: 100% (08 Jun 2022 12:24) (99% - 100%)                          10.2   5.05  )-----------( 202      ( 08 Jun 2022 11:25 )             31.4                     PE:  The patient was seen and examined at bedside          A&OX3, NAD          Aquacel  dressing C/D/I          Compartments soft, BLE SCD in place          NVI, SILT           A/P:     # POD #  1     s/p right Total Hip Arthroplasty                 - OOB to Chair   -PT/OT - wbat  -Pain control - per pain protocol   -Incentive Spirometry   -DVT Prophylaxis - aspirin   -GI ppx- continue Protonix   -f/u am labs   -discharge planning

## 2022-06-09 NOTE — OCCUPATIONAL THERAPY INITIAL EVALUATION ADULT - GENERAL OBSERVATIONS, REHAB EVAL
09:15-09:50 Chart reviewed, ok to treat by Occupational Therapist as confirmed by RN Romelia, Pt received at bedside chair +heplock +aquacel right hip in NAD. Pt in agreement with OT IE.

## 2022-06-09 NOTE — DISCHARGE NOTE NURSING/CASE MANAGEMENT/SOCIAL WORK - PATIENT PORTAL LINK FT
You can access the FollowMyHealth Patient Portal offered by [facility] by registering at the following website: [email or web address]. By joining "[employer]’s FollowMyHealth portal, you will also be able to view your health information using other applications (apps) compatible with our system.

## 2022-06-09 NOTE — DISCHARGE NOTE NURSING/CASE MANAGEMENT/SOCIAL WORK - NSDCPEFALRISK_GEN_ALL_CORE
For information on Fall & Injury Prevention, visit: [email or web address] OR  [email or web address] OR  https://www.cdc.gov/steadi/patient.html

## 2022-06-13 LAB — SURGICAL PATHOLOGY STUDY: SIGNIFICANT CHANGE UP

## 2022-06-15 DIAGNOSIS — M16.11 UNILATERAL PRIMARY OSTEOARTHRITIS, RIGHT HIP: ICD-10-CM

## 2022-06-15 DIAGNOSIS — Z96.652 PRESENCE OF LEFT ARTIFICIAL KNEE JOINT: ICD-10-CM

## 2022-06-15 DIAGNOSIS — E66.9 OBESITY, UNSPECIFIED: ICD-10-CM

## 2022-06-15 DIAGNOSIS — F43.10 POST-TRAUMATIC STRESS DISORDER, UNSPECIFIED: ICD-10-CM

## 2022-06-15 DIAGNOSIS — M06.9 RHEUMATOID ARTHRITIS, UNSPECIFIED: ICD-10-CM

## 2022-06-15 DIAGNOSIS — F32.9 MAJOR DEPRESSIVE DISORDER, SINGLE EPISODE, UNSPECIFIED: ICD-10-CM

## 2022-06-15 DIAGNOSIS — F41.9 ANXIETY DISORDER, UNSPECIFIED: ICD-10-CM

## 2022-06-15 DIAGNOSIS — M79.7 FIBROMYALGIA: ICD-10-CM

## 2022-06-15 DIAGNOSIS — K21.9 GASTRO-ESOPHAGEAL REFLUX DISEASE WITHOUT ESOPHAGITIS: ICD-10-CM

## 2022-06-27 ENCOUNTER — APPOINTMENT (OUTPATIENT)
Dept: ORTHOPEDIC SURGERY | Facility: CLINIC | Age: 62
End: 2022-06-27

## 2022-06-27 VITALS — WEIGHT: 166 LBS | HEIGHT: 66 IN | BODY MASS INDEX: 26.68 KG/M2

## 2022-06-27 DIAGNOSIS — Z80.0 FAMILY HISTORY OF MALIGNANT NEOPLASM OF DIGESTIVE ORGANS: ICD-10-CM

## 2022-06-27 DIAGNOSIS — M25.551 PAIN IN RIGHT HIP: ICD-10-CM

## 2022-06-27 DIAGNOSIS — Z78.9 OTHER SPECIFIED HEALTH STATUS: ICD-10-CM

## 2022-06-27 PROCEDURE — 99203 OFFICE O/P NEW LOW 30 MIN: CPT

## 2022-06-27 PROCEDURE — 73502 X-RAY EXAM HIP UNI 2-3 VIEWS: CPT | Mod: RT

## 2022-06-27 RX ORDER — SULFAMETHOXAZOLE AND TRIMETHOPRIM 800; 160 MG/1; MG/1
800-160 TABLET ORAL TWICE DAILY
Qty: 20 | Refills: 0 | Status: ACTIVE | COMMUNITY
Start: 2022-06-27 | End: 1900-01-01

## 2022-06-27 NOTE — DISCUSSION/SUMMARY
[de-identified] :  discussed x-rays with patient, surgical hardware intact.  Bactrim  DM sent to patient's pharmacy, take as discussed, call if symptoms worsen or change.  Patient understands and agrees with plan.  Call if any fevers / chills / other symptoms.  Follow-up in 1 week for re-evaluation with [facility]. patient understands agrees with plan.  Patient seen under supervision of Dr. Adams.

## 2022-06-27 NOTE — HISTORY OF PRESENT ILLNESS
[de-identified] :  patient is a 61-year-old female status post right hip replacement 06/08/2022.  Patient states overall she has been healing well began noticing green/ yellow oozing out of the distal  aspect of her incision.  Patient states she has been having some symptoms of illness such as runny nose. denies fever/chills/ severe hip pain.  Patient states overall her her feels well, no other concerns.

## 2022-06-27 NOTE — DATA REVIEWED
[Right] : of the right [Hip] : hip [FreeTextEntry1] : No acute fractures, dislocations, abnormalities.  Surgical hardware intact

## 2022-06-27 NOTE — PHYSICAL EXAM
[] : non-antalgic [FreeTextEntry3] :  incision site healing well, no erythema.\par  there is mild swelling over the area of the incision, mild discharge noted from distal aspect incision/ mildly purulent [FreeTextEntry8] :  mild pain to palpation over incision site\par  no groin tenderness [FreeTextEntry9] :  good range of motion throughout with mild pain [de-identified] :  good strength throughout [de-identified] :  warm and well-perfused

## 2022-06-30 ENCOUNTER — APPOINTMENT (OUTPATIENT)
Dept: ORTHOPEDIC SURGERY | Facility: CLINIC | Age: 62
End: 2022-06-30

## 2022-07-07 ENCOUNTER — APPOINTMENT (OUTPATIENT)
Dept: ORTHOPEDIC SURGERY | Facility: CLINIC | Age: 62
End: 2022-07-07

## 2022-07-07 VITALS — WEIGHT: 166 LBS | BODY MASS INDEX: 26.68 KG/M2 | HEIGHT: 66 IN

## 2022-07-07 DIAGNOSIS — Z96.641 PRESENCE OF RIGHT ARTIFICIAL HIP JOINT: ICD-10-CM

## 2022-07-07 PROCEDURE — 99024 POSTOP FOLLOW-UP VISIT: CPT

## 2022-07-07 NOTE — HISTORY OF PRESENT ILLNESS
[de-identified] : patient doing well s/p rtha\par no pain\par walking no device\par incsion healed\par xray reviewed, implant in good position\par f/u as needed

## 2022-07-13 ENCOUNTER — APPOINTMENT (OUTPATIENT)
Dept: ORTHOPEDIC SURGERY | Facility: CLINIC | Age: 62
End: 2022-07-13

## 2022-09-09 NOTE — PATIENT PROFILE ADULT - SURGICAL SITE DRAIN
GENERAL PRE-PROCEDURE:   Procedure:  Transesophageal echocardiogram   Date/Time:  9/9/2022 8:32 AM    Verbal consent obtained?: Yes    Written consent obtained?: Yes    Risks and benefits: Risks, benefits and alternatives were discussed    Consent given by:  Patient  Patient states understanding of procedure being performed: Yes    Patient's understanding of procedure matches consent: Yes    Procedure consent matches procedure scheduled: Yes    Expected level of sedation:  Moderate  Appropriately NPO:  Yes  Mallampati  :  Grade 2- soft palate, base of uvula, tonsillar pillars, and portion of posterior pharyngeal wall visible  Lungs:  Lungs clear with good breath sounds bilaterally  Heart:  RRR  History & Physical reviewed:  History and physical reviewed and no updates needed  Statement of review:  I have reviewed the lab findings, diagnostic data, medications, and the plan for sedation    
no

## 2023-02-03 ENCOUNTER — APPOINTMENT (OUTPATIENT)
Dept: ORTHOPEDIC SURGERY | Facility: CLINIC | Age: 63
End: 2023-02-03
Payer: MEDICARE

## 2023-02-03 PROCEDURE — 20610 DRAIN/INJ JOINT/BURSA W/O US: CPT | Mod: RT

## 2023-02-03 PROCEDURE — 99213 OFFICE O/P EST LOW 20 MIN: CPT | Mod: 25

## 2023-02-03 PROCEDURE — 73560 X-RAY EXAM OF KNEE 1 OR 2: CPT | Mod: 50

## 2023-02-03 NOTE — HISTORY OF PRESENT ILLNESS
[de-identified] : 62-year-old lady following up, doing well with her right hip replacement, complains of bilateral knee pain, intermittent.  Left side was replaced, for the most part that is not all that painful but she does notice some clicking with certain movements around the replacement.\par She has some right knee pain, she has a history of OA on that side as well.\par \par Exam shows full range of motion in both knees, no contractures, no gross instability noted.  Normal exam of the left knee replacement, the incision is well-healed.\par \par Right knee has some jointline tenderness and some patellofemoral tenderness.\par \par Imaging:\par X-rays were reviewed with the patient.  \par AP and Lateral views were obtained of the knees, including the contralateral side for comparison purposes.\par X-rays are negative for acute bone or soft tissue trauma.\par Left knee replacement is in good position, the right knee has mild to moderate arthritic changes.\par \par Plan is for right knee injection, the right knee injected with 4 cc of lidocaine and 40 mg of Kenalog under sterile technique without complication.\par \par She will follow-up with me as needed.

## 2023-03-13 ENCOUNTER — APPOINTMENT (OUTPATIENT)
Dept: ORTHOPEDIC SURGERY | Facility: CLINIC | Age: 63
End: 2023-03-13
Payer: MEDICARE

## 2023-03-13 VITALS — HEIGHT: 66 IN | WEIGHT: 165 LBS | BODY MASS INDEX: 26.52 KG/M2

## 2023-03-13 DIAGNOSIS — M18.12 UNILATERAL PRIMARY OSTEOARTHRITIS OF FIRST CARPOMETACARPAL JOINT, LEFT HAND: ICD-10-CM

## 2023-03-13 PROCEDURE — 20605 DRAIN/INJ JOINT/BURSA W/O US: CPT | Mod: LT

## 2023-03-13 PROCEDURE — 99212 OFFICE O/P EST SF 10 MIN: CPT | Mod: 25

## 2023-03-13 NOTE — PROCEDURE
[FreeTextEntry3] : Thumb CMC injection was performed because of pain inflammation and stiffness\par Anesthesia: ethyl chloride sprayed topically\par Dexamethasone: An injection of Dexamethasone 1cc  4mg/ml\par Lidocaine: An injection of Lidocaine 1% 1cc\par \par Patient has tried OTC's including aspirin, Ibuprofen, Aleve etc or prescription NSAIDS, and/or exercises at home and/ or\par physical therapy without satisfactory response.\par After verbal consent using sterile preparation and technique. The risks, benefits, and alternatives to cortisone injection\par were explained in full to the patient. Risks outlined include but are not limited to infection, sepsis, bleeding, scarring, skin\par discoloration, temporary increase in pain, syncopal episode, failure to resolve symptoms, allergic reaction, symptom\par recurrence, and elevation of blood sugar in diabetics. Patient understood the risks. All questions were answered. After\par discussion of options, patient requested an injection. Oral informed consent was obtained and sterile prep was done of the\par injection site. Sterile technique was utilized for the procedure including the preparation of the solutions used for the\par injection. Patient tolerated the procedure well. Advised to ice the injection site this evening.\par Prep with ETOH locally to site. Sterile technique used.  The basal joint of the thumb was injected\par Left thumb basal joint.  Intra-articular

## 2023-03-13 NOTE — ASSESSMENT
[FreeTextEntry1] : I think the patient has some basal joint arthritis present she also may have a component of flexor carpi radialis tenosynovitis.  Right now there is no volar ganglion cyst present.  Opting for cortisone injection to the basal joint tolerated that well.  Natural history was discussed.

## 2023-03-13 NOTE — PHYSICAL EXAM
[de-identified] : Patient has full range of motion of the left wrist and hand patient has no volar radial ganglion cyst that was there before she has normal sensation normal cap refill no erythema ecchymoses or abrasions no instability.  There is tenderness to palpation at the basal joint tenderness to palpation along the flexor carpi radialis no swelling

## 2023-03-13 NOTE — HISTORY OF PRESENT ILLNESS
[de-identified] : 62-year-old female with left-sided wrist pain discomfort comes in today for evaluation.  She had no real treatment for the condition there was a mass present course the volar radial aspect of the left wrist that is now not as large as it was before comes in for the evaluation today.

## 2023-06-01 NOTE — ED ADULT TRIAGE NOTE - RESPIRATORY RATE (BREATHS/MIN)
Procedure:  COLONOSCOPY    Relevant Problems   CARDIO   (+) Chest pain   (+) Ventricular ectopy      GI/HEPATIC   (+) Liver lesion      MUSCULOSKELETAL   (+) Back pain      Other   (+) Tobacco use      Smokes marijuana   Asthma, controlled, PRN albuterol    Normal Echo on 3/22/21    Physical Exam    Airway    Mallampati score: I  TM Distance: >3 FB  Neck ROM: full     Dental   No notable dental hx     Cardiovascular      Pulmonary      Other Findings        Anesthesia Plan  ASA Score- 2     Anesthesia Type- IV sedation with anesthesia with ASA Monitors  Additional Monitors:   Airway Plan:           Plan Factors-Exercise tolerance (METS): >4 METS  Chart reviewed  EKG reviewed  Patient summary reviewed  Patient is a current smoker  Patient smoked on day of surgery  Obstructive sleep apnea risk education given perioperatively  Induction- intravenous  Postoperative Plan-     Informed Consent- Anesthetic plan and risks discussed with patient  I personally reviewed this patient with the CRNA  Discussed and agreed on the Anesthesia Plan with the CRNA  Robin Pitts 20

## 2023-06-21 NOTE — PHYSICAL THERAPY INITIAL EVALUATION ADULT - LEVEL OF INDEPENDENCE: SUPINE/SIT, REHAB EVAL
contact guard Cibinqo Counseling: I discussed with the patient the risks of Cibinqo therapy including but not limited to common cold, nausea, headache, cold sores, increased blood CPK levels, dizziness, UTIs, fatigue, acne, and vomitting. Live vaccines should be avoided.  This medication has been linked to serious infections; higher rate of mortality; malignancy and lymphoproliferative disorders; major adverse cardiovascular events; thrombosis; thrombocytopenia and lymphopenia; lipid elevations; and retinal detachment.

## 2023-06-22 ENCOUNTER — APPOINTMENT (OUTPATIENT)
Dept: ORTHOPEDIC SURGERY | Facility: CLINIC | Age: 63
End: 2023-06-22
Payer: MEDICARE

## 2023-06-22 DIAGNOSIS — M54.41 LUMBAGO WITH SCIATICA, RIGHT SIDE: ICD-10-CM

## 2023-06-22 DIAGNOSIS — G89.29 LUMBAGO WITH SCIATICA, RIGHT SIDE: ICD-10-CM

## 2023-06-22 PROCEDURE — 99213 OFFICE O/P EST LOW 20 MIN: CPT

## 2023-06-22 NOTE — HISTORY OF PRESENT ILLNESS
[de-identified] : f/u of right knee\par has pain\par also notes pain shooting from back and numbness in big toe\par has back pain\par will get MRI to evaluate radiculopathy and neurologic issue

## 2023-06-30 ENCOUNTER — APPOINTMENT (OUTPATIENT)
Dept: MRI IMAGING | Facility: CLINIC | Age: 63
End: 2023-06-30
Payer: MEDICARE

## 2023-06-30 PROCEDURE — 72148 MRI LUMBAR SPINE W/O DYE: CPT

## 2023-09-05 NOTE — BRIEF OPERATIVE NOTE - ASSISTANT(S)
Robb Non-Graft Cartilage Fenestration Text: The cartilage was fenestrated with a 2mm punch biopsy to help facilitate healing.

## 2023-09-19 ENCOUNTER — APPOINTMENT (OUTPATIENT)
Dept: ORTHOPEDIC SURGERY | Facility: CLINIC | Age: 63
End: 2023-09-19
Payer: MEDICARE

## 2023-09-19 DIAGNOSIS — M17.11 UNILATERAL PRIMARY OSTEOARTHRITIS, RIGHT KNEE: ICD-10-CM

## 2023-09-19 PROCEDURE — 73560 X-RAY EXAM OF KNEE 1 OR 2: CPT | Mod: 50

## 2023-09-19 PROCEDURE — 99214 OFFICE O/P EST MOD 30 MIN: CPT

## 2023-10-12 NOTE — PHYSICAL THERAPY INITIAL EVALUATION ADULT - NAME OF DISCHARGE PLANNER
C/O- pt had right knee injury 2 weeks ago  Saw ortho and had injections  Due to patient last office visit with Dr grier pt discontinued Mobic due to her history and the risk.  Pt is asking for advice on what she can take for situations as this. She does have pain and swelling due to her ostearthritis. She is taking tylenol but minimal relief.  Will route to provider for review and advise.  LOV 9/19/23  NOV 3/19/24     Will follow up with Siobhan in AM

## 2023-10-31 NOTE — CHART NOTE - NSCHARTNOTEFT_GEN_A_CORE
PACU ANESTHESIA ADMISSION NOTE      Procedure: Left total knee replacement  Post op diagnosis:  OA left knee    ____  Intubated  TV:______       Rate: ______      FiO2: ______    _x___  Patent Airway    x____  Full return of protective reflexes    _x___  Full recovery from anesthesia / back to baseline     Vitals:   T:  98         R:    16              BP:  118/78                Sat:  99%                 P: 66      Mental Status:  __x__ Awake   __x___ Alert   _____ Drowsy   _____ Sedated    Nausea/Vomiting:  ____ NO  __x____Yes,   See Post - Op Orders          Pain Scale (0-10):  _____    Treatment: ____ None    x____ See Post - Op/PCA Orders    Post - Operative Fluids:   ____ Oral   __x__ See Post - Op Orders    Plan: Discharge:   ____Home       _x____Floor     _____Critical Care    _____  Other:_________________    Comments: Spoke to patient confirmed 11/2 was canceled and rescheduled to 11/7. Patient is aware he will be getting AVS once discharged from hospital. Patient has my teams number if rescheduling is needed.

## 2023-12-05 ENCOUNTER — OUTPATIENT (OUTPATIENT)
Dept: OUTPATIENT SERVICES | Facility: HOSPITAL | Age: 63
LOS: 1 days | End: 2023-12-05
Payer: MEDICARE

## 2023-12-05 ENCOUNTER — RESULT REVIEW (OUTPATIENT)
Age: 63
End: 2023-12-05

## 2023-12-05 VITALS
SYSTOLIC BLOOD PRESSURE: 144 MMHG | TEMPERATURE: 98 F | WEIGHT: 160.06 LBS | HEIGHT: 66 IN | HEART RATE: 80 BPM | OXYGEN SATURATION: 100 % | RESPIRATION RATE: 16 BRPM | DIASTOLIC BLOOD PRESSURE: 70 MMHG

## 2023-12-05 DIAGNOSIS — M17.11 UNILATERAL PRIMARY OSTEOARTHRITIS, RIGHT KNEE: ICD-10-CM

## 2023-12-05 DIAGNOSIS — Z98.890 OTHER SPECIFIED POSTPROCEDURAL STATES: Chronic | ICD-10-CM

## 2023-12-05 DIAGNOSIS — Z96.652 PRESENCE OF LEFT ARTIFICIAL KNEE JOINT: Chronic | ICD-10-CM

## 2023-12-05 DIAGNOSIS — Z96.641 PRESENCE OF RIGHT ARTIFICIAL HIP JOINT: Chronic | ICD-10-CM

## 2023-12-05 DIAGNOSIS — K63.5 POLYP OF COLON: Chronic | ICD-10-CM

## 2023-12-05 DIAGNOSIS — Z01.818 ENCOUNTER FOR OTHER PREPROCEDURAL EXAMINATION: ICD-10-CM

## 2023-12-05 LAB
A1C WITH ESTIMATED AVERAGE GLUCOSE RESULT: 5.5 % — SIGNIFICANT CHANGE UP (ref 4–5.6)
A1C WITH ESTIMATED AVERAGE GLUCOSE RESULT: 5.5 % — SIGNIFICANT CHANGE UP (ref 4–5.6)
ALBUMIN SERPL ELPH-MCNC: 3.9 G/DL — SIGNIFICANT CHANGE UP (ref 3.5–5.2)
ALBUMIN SERPL ELPH-MCNC: 3.9 G/DL — SIGNIFICANT CHANGE UP (ref 3.5–5.2)
ALP SERPL-CCNC: 92 U/L — SIGNIFICANT CHANGE UP (ref 30–115)
ALP SERPL-CCNC: 92 U/L — SIGNIFICANT CHANGE UP (ref 30–115)
ALT FLD-CCNC: 23 U/L — SIGNIFICANT CHANGE UP (ref 0–41)
ALT FLD-CCNC: 23 U/L — SIGNIFICANT CHANGE UP (ref 0–41)
ANION GAP SERPL CALC-SCNC: 15 MMOL/L — HIGH (ref 7–14)
ANION GAP SERPL CALC-SCNC: 15 MMOL/L — HIGH (ref 7–14)
APTT BLD: 40.3 SEC — HIGH (ref 27–39.2)
APTT BLD: 40.3 SEC — HIGH (ref 27–39.2)
AST SERPL-CCNC: 26 U/L — SIGNIFICANT CHANGE UP (ref 0–41)
AST SERPL-CCNC: 26 U/L — SIGNIFICANT CHANGE UP (ref 0–41)
BASOPHILS # BLD AUTO: 0.04 K/UL — SIGNIFICANT CHANGE UP (ref 0–0.2)
BASOPHILS # BLD AUTO: 0.04 K/UL — SIGNIFICANT CHANGE UP (ref 0–0.2)
BASOPHILS NFR BLD AUTO: 1.1 % — HIGH (ref 0–1)
BASOPHILS NFR BLD AUTO: 1.1 % — HIGH (ref 0–1)
BILIRUB SERPL-MCNC: 0.2 MG/DL — SIGNIFICANT CHANGE UP (ref 0.2–1.2)
BILIRUB SERPL-MCNC: 0.2 MG/DL — SIGNIFICANT CHANGE UP (ref 0.2–1.2)
BLD GP AB SCN SERPL QL: SIGNIFICANT CHANGE UP
BLD GP AB SCN SERPL QL: SIGNIFICANT CHANGE UP
BUN SERPL-MCNC: 31 MG/DL — HIGH (ref 10–20)
BUN SERPL-MCNC: 31 MG/DL — HIGH (ref 10–20)
CALCIUM SERPL-MCNC: 9.6 MG/DL — SIGNIFICANT CHANGE UP (ref 8.4–10.5)
CALCIUM SERPL-MCNC: 9.6 MG/DL — SIGNIFICANT CHANGE UP (ref 8.4–10.5)
CHLORIDE SERPL-SCNC: 103 MMOL/L — SIGNIFICANT CHANGE UP (ref 98–110)
CHLORIDE SERPL-SCNC: 103 MMOL/L — SIGNIFICANT CHANGE UP (ref 98–110)
CO2 SERPL-SCNC: 23 MMOL/L — SIGNIFICANT CHANGE UP (ref 17–32)
CO2 SERPL-SCNC: 23 MMOL/L — SIGNIFICANT CHANGE UP (ref 17–32)
CREAT SERPL-MCNC: 1.4 MG/DL — SIGNIFICANT CHANGE UP (ref 0.7–1.5)
CREAT SERPL-MCNC: 1.4 MG/DL — SIGNIFICANT CHANGE UP (ref 0.7–1.5)
EGFR: 43 ML/MIN/1.73M2 — LOW
EGFR: 43 ML/MIN/1.73M2 — LOW
EOSINOPHIL # BLD AUTO: 0.16 K/UL — SIGNIFICANT CHANGE UP (ref 0–0.7)
EOSINOPHIL # BLD AUTO: 0.16 K/UL — SIGNIFICANT CHANGE UP (ref 0–0.7)
EOSINOPHIL NFR BLD AUTO: 4.3 % — SIGNIFICANT CHANGE UP (ref 0–8)
EOSINOPHIL NFR BLD AUTO: 4.3 % — SIGNIFICANT CHANGE UP (ref 0–8)
ESTIMATED AVERAGE GLUCOSE: 111 MG/DL — SIGNIFICANT CHANGE UP (ref 68–114)
ESTIMATED AVERAGE GLUCOSE: 111 MG/DL — SIGNIFICANT CHANGE UP (ref 68–114)
GLUCOSE SERPL-MCNC: 80 MG/DL — SIGNIFICANT CHANGE UP (ref 70–99)
GLUCOSE SERPL-MCNC: 80 MG/DL — SIGNIFICANT CHANGE UP (ref 70–99)
HCT VFR BLD CALC: 32.2 % — LOW (ref 37–47)
HCT VFR BLD CALC: 32.2 % — LOW (ref 37–47)
HGB BLD-MCNC: 10.3 G/DL — LOW (ref 12–16)
HGB BLD-MCNC: 10.3 G/DL — LOW (ref 12–16)
IMM GRANULOCYTES NFR BLD AUTO: 0.3 % — SIGNIFICANT CHANGE UP (ref 0.1–0.3)
IMM GRANULOCYTES NFR BLD AUTO: 0.3 % — SIGNIFICANT CHANGE UP (ref 0.1–0.3)
INR BLD: 0.9 RATIO — SIGNIFICANT CHANGE UP (ref 0.65–1.3)
INR BLD: 0.9 RATIO — SIGNIFICANT CHANGE UP (ref 0.65–1.3)
LYMPHOCYTES # BLD AUTO: 0.56 K/UL — LOW (ref 1.2–3.4)
LYMPHOCYTES # BLD AUTO: 0.56 K/UL — LOW (ref 1.2–3.4)
LYMPHOCYTES # BLD AUTO: 15.1 % — LOW (ref 20.5–51.1)
LYMPHOCYTES # BLD AUTO: 15.1 % — LOW (ref 20.5–51.1)
MCHC RBC-ENTMCNC: 31.1 PG — HIGH (ref 27–31)
MCHC RBC-ENTMCNC: 31.1 PG — HIGH (ref 27–31)
MCHC RBC-ENTMCNC: 32 G/DL — SIGNIFICANT CHANGE UP (ref 32–37)
MCHC RBC-ENTMCNC: 32 G/DL — SIGNIFICANT CHANGE UP (ref 32–37)
MCV RBC AUTO: 97.3 FL — SIGNIFICANT CHANGE UP (ref 81–99)
MCV RBC AUTO: 97.3 FL — SIGNIFICANT CHANGE UP (ref 81–99)
MONOCYTES # BLD AUTO: 0.47 K/UL — SIGNIFICANT CHANGE UP (ref 0.1–0.6)
MONOCYTES # BLD AUTO: 0.47 K/UL — SIGNIFICANT CHANGE UP (ref 0.1–0.6)
MONOCYTES NFR BLD AUTO: 12.7 % — HIGH (ref 1.7–9.3)
MONOCYTES NFR BLD AUTO: 12.7 % — HIGH (ref 1.7–9.3)
MRSA PCR RESULT.: NEGATIVE — SIGNIFICANT CHANGE UP
MRSA PCR RESULT.: NEGATIVE — SIGNIFICANT CHANGE UP
NEUTROPHILS # BLD AUTO: 2.46 K/UL — SIGNIFICANT CHANGE UP (ref 1.4–6.5)
NEUTROPHILS # BLD AUTO: 2.46 K/UL — SIGNIFICANT CHANGE UP (ref 1.4–6.5)
NEUTROPHILS NFR BLD AUTO: 66.5 % — SIGNIFICANT CHANGE UP (ref 42.2–75.2)
NEUTROPHILS NFR BLD AUTO: 66.5 % — SIGNIFICANT CHANGE UP (ref 42.2–75.2)
NRBC # BLD: 0 /100 WBCS — SIGNIFICANT CHANGE UP (ref 0–0)
NRBC # BLD: 0 /100 WBCS — SIGNIFICANT CHANGE UP (ref 0–0)
PLATELET # BLD AUTO: 267 K/UL — SIGNIFICANT CHANGE UP (ref 130–400)
PLATELET # BLD AUTO: 267 K/UL — SIGNIFICANT CHANGE UP (ref 130–400)
PMV BLD: 9.7 FL — SIGNIFICANT CHANGE UP (ref 7.4–10.4)
PMV BLD: 9.7 FL — SIGNIFICANT CHANGE UP (ref 7.4–10.4)
POTASSIUM SERPL-MCNC: 5.4 MMOL/L — HIGH (ref 3.5–5)
POTASSIUM SERPL-MCNC: 5.4 MMOL/L — HIGH (ref 3.5–5)
POTASSIUM SERPL-SCNC: 5.4 MMOL/L — HIGH (ref 3.5–5)
POTASSIUM SERPL-SCNC: 5.4 MMOL/L — HIGH (ref 3.5–5)
PROT SERPL-MCNC: 6.3 G/DL — SIGNIFICANT CHANGE UP (ref 6–8)
PROT SERPL-MCNC: 6.3 G/DL — SIGNIFICANT CHANGE UP (ref 6–8)
PROTHROM AB SERPL-ACNC: 10.2 SEC — SIGNIFICANT CHANGE UP (ref 9.95–12.87)
PROTHROM AB SERPL-ACNC: 10.2 SEC — SIGNIFICANT CHANGE UP (ref 9.95–12.87)
RBC # BLD: 3.31 M/UL — LOW (ref 4.2–5.4)
RBC # BLD: 3.31 M/UL — LOW (ref 4.2–5.4)
RBC # FLD: 13.3 % — SIGNIFICANT CHANGE UP (ref 11.5–14.5)
RBC # FLD: 13.3 % — SIGNIFICANT CHANGE UP (ref 11.5–14.5)
SODIUM SERPL-SCNC: 141 MMOL/L — SIGNIFICANT CHANGE UP (ref 135–146)
SODIUM SERPL-SCNC: 141 MMOL/L — SIGNIFICANT CHANGE UP (ref 135–146)
WBC # BLD: 3.7 K/UL — LOW (ref 4.8–10.8)
WBC # BLD: 3.7 K/UL — LOW (ref 4.8–10.8)
WBC # FLD AUTO: 3.7 K/UL — LOW (ref 4.8–10.8)
WBC # FLD AUTO: 3.7 K/UL — LOW (ref 4.8–10.8)

## 2023-12-05 PROCEDURE — 93010 ELECTROCARDIOGRAM REPORT: CPT

## 2023-12-05 PROCEDURE — 86900 BLOOD TYPING SEROLOGIC ABO: CPT

## 2023-12-05 PROCEDURE — 72170 X-RAY EXAM OF PELVIS: CPT | Mod: 26

## 2023-12-05 PROCEDURE — 85610 PROTHROMBIN TIME: CPT

## 2023-12-05 PROCEDURE — 86850 RBC ANTIBODY SCREEN: CPT

## 2023-12-05 PROCEDURE — 73562 X-RAY EXAM OF KNEE 3: CPT | Mod: 26,RT

## 2023-12-05 PROCEDURE — 80053 COMPREHEN METABOLIC PANEL: CPT

## 2023-12-05 PROCEDURE — 87640 STAPH A DNA AMP PROBE: CPT

## 2023-12-05 PROCEDURE — 36415 COLL VENOUS BLD VENIPUNCTURE: CPT

## 2023-12-05 PROCEDURE — 86901 BLOOD TYPING SEROLOGIC RH(D): CPT

## 2023-12-05 PROCEDURE — 93005 ELECTROCARDIOGRAM TRACING: CPT

## 2023-12-05 PROCEDURE — 87641 MR-STAPH DNA AMP PROBE: CPT

## 2023-12-05 PROCEDURE — 99214 OFFICE O/P EST MOD 30 MIN: CPT | Mod: 25

## 2023-12-05 PROCEDURE — 85025 COMPLETE CBC W/AUTO DIFF WBC: CPT

## 2023-12-05 PROCEDURE — 85730 THROMBOPLASTIN TIME PARTIAL: CPT

## 2023-12-05 PROCEDURE — 72170 X-RAY EXAM OF PELVIS: CPT

## 2023-12-05 PROCEDURE — 83036 HEMOGLOBIN GLYCOSYLATED A1C: CPT

## 2023-12-05 PROCEDURE — 73562 X-RAY EXAM OF KNEE 3: CPT | Mod: RT

## 2023-12-05 RX ORDER — TRAMADOL HYDROCHLORIDE 50 MG/1
1 TABLET ORAL
Qty: 0 | Refills: 0 | DISCHARGE

## 2023-12-05 NOTE — H&P PST ADULT - HISTORY OF PRESENT ILLNESS
"I AM HAVING A RIGHT TOTAL KNEE REPLACEMENT'  REPORTS INJURY TO BOTH KNEES IN 2014 WHICH LED TO SUBSEQUENT LEFT KNEE AND RIGHT HIP REPLACEMENT.  REPORTS PAIN SINCE THEM BUT SEVERAL MONTHS AGO NOTICED DECREASE IN MOBILITY.  IE DIFFICULTY WALKING AFTER SITTING.    REPORTS 10/10 PAIN AT WORST WHICH PT CANNOT DESCRIBE   NON RADIATING.   WORSENS WITH SITTING THEN STANDING.  MOVEMENT DECREASES    PATIENT CURRENTLY DENIES CHEST PAIN    PALPITATIONS,  DYSURIA, OR URI WITHIN THE IN PAST 2 WEEKS    -Denies travel outside the [place] in the past 30 days  -Patient denies any signs or symptoms of COVID 19 and denies contact with known positive individuals.    -Patient was instructed to quarantine pre-procedure, practice exposure control measures, continue to self-monitor and report any concerns to their proceduralist.  -Pt advised self quarantine till day of procedure    ANESTHESIA ALERT  NO--Difficult Airway  NO--History of neck surgery or radiation  NO--Limited ROM of neck   FULL ROM  NO--History of Malignant hyperthermia  NO--No personal or family history of Pseudocholinesterase deficiency.  NO--Prior Anesthesia Complication  NO--Latex Allergy   HIGHLY ALLERGIC TO ALL FRUITS  NO--Loose teeth   CRACKED UPPER RIGHT MOLAR  YES--History of Rheumatoid Arthritis  NO--Bleeding risk  NO--TING  YES--Implants   JOINT REPLACEMENTS  NO--Other_____    -PT DENIES ANY RASHES, ABRASION, OR OPEN WOUNDS     -Pt denies any suicidal ideation or thoughts.  Pt states not a threat to self or others.  PSTD FROM ATTEMPTED POISONING IN PAST  DENIES DOMESTIC VIOLENCE     AS PER THE PT, THIS IS HIS/HER COMPLETE MEDICAL AND SURGICAL HX, INCLUDING MEDICATIONS PRESCRIBED AND OVER THE COUNTER    Patient verbalized understanding of instructions and was given the opportunity to ask questions and have them answered.    Duke Activity Status Index (DASI) from [email or web address]  on 12/5/2023  ** All calculations should be rechecked by clinician prior to use **    RESULT SUMMARY:  58.2 points  The higher the score (maximum 58.2), the higher the functional status.    9.89 METs        INPUTS:  Take care of self —> 2.75 = Yes  Walk indoors —> 1.75 = Yes  Walk 1&ndash;2 blocks on level ground —> 2.75 = Yes  Climb a flight of stairs or walk up a hill —> 5.5 = Yes  Run a short distance —> 8 = Yes  Do light work around the house —> 2.7 = Yes  Do moderate work around the house —> 3.5 = Yes  Do heavy work around the house —> 8 = Yes  Do yardwork —> 4.5 = Yes  Have sexual relations —> 5.25 = Yes  Participate in moderate recreational activities —> 6 = Yes  Participate in strenuous sports —> 7.5 = Yes    RCRI 0     CLASS RISK I     3.9     "I AM HAVING A RIGHT TOTAL KNEE REPLACEMENT'  REPORTS INJURY TO BOTH KNEES IN 2014 WHICH LED TO SUBSEQUENT LEFT KNEE AND RIGHT HIP REPLACEMENT.  REPORTS PAIN SINCE THEM BUT SEVERAL MONTHS AGO NOTICED DECREASE IN MOBILITY.  IE DIFFICULTY WALKING AFTER SITTING.    REPORTS 10/10 PAIN AT WORST WHICH PT CANNOT DESCRIBE   NON RADIATING.   WORSENS WITH SITTING THEN STANDING.  MOVEMENT DECREASES    PATIENT CURRENTLY DENIES CHEST PAIN    PALPITATIONS,  DYSURIA, OR URI WITHIN THE IN PAST 2 WEEKS    -Denies travel outside the [place] in the past 30 days  -Patient denies any signs or symptoms of COVID 19 and denies contact with known positive individuals.    -Patient was instructed to quarantine pre-procedure, practice exposure control measures, continue to self-monitor and report any concerns to their proceduralist.  -Pt advised self quarantine till day of procedure    ANESTHESIA ALERT  NO--Difficult Airway  NO--History of neck surgery or radiation  NO--Limited ROM of neck   FULL ROM  NO--History of Malignant hyperthermia  NO--No personal or family history of Pseudocholinesterase deficiency.  NO--Prior Anesthesia Complication  NO--Latex Allergy   HIGHLY ALLERGIC TO ALL FRUITS  NO--Loose teeth   CRACKED UPPER RIGHT MOLAR  YES--History of Rheumatoid Arthritis  NO--Bleeding risk  NO--TING  YES--Implants   JOINT REPLACEMENTS  NO--Other_____    -PT DENIES ANY RASHES, ABRASION, OR OPEN WOUNDS     -Pt denies any suicidal ideation or thoughts.  Pt states not a threat to self or others.  PSTD FROM ATTEMPTED POISONING IN PAST  DENIES DOMESTIC VIOLENCE     AS PER THE PT, THIS IS HIS/HER COMPLETE MEDICAL AND SURGICAL HX, INCLUDING MEDICATIONS PRESCRIBED AND OVER THE COUNTER    Patient verbalized understanding of instructions and was given the opportunity to ask questions and have them answered.    Duke Activity Status Index (DASI) from [employer]  on 12/5/2023  ** All calculations should be rechecked by clinician prior to use **    RESULT SUMMARY:  58.2 points  The higher the score (maximum 58.2), the higher the functional status.    9.89 METs        INPUTS:  Take care of self —> 2.75 = Yes  Walk indoors —> 1.75 = Yes  Walk 1&ndash;2 blocks on level ground —> 2.75 = Yes  Climb a flight of stairs or walk up a hill —> 5.5 = Yes  Run a short distance —> 8 = Yes  Do light work around the house —> 2.7 = Yes  Do moderate work around the house —> 3.5 = Yes  Do heavy work around the house —> 8 = Yes  Do yardwork —> 4.5 = Yes  Have sexual relations —> 5.25 = Yes  Participate in moderate recreational activities —> 6 = Yes  Participate in strenuous sports —> 7.5 = Yes    RCRI 0     CLASS RISK I     3.9

## 2023-12-05 NOTE — H&P PST ADULT - NSICDXPASTMEDICALHX_GEN_ALL_CORE_FT
PAST MEDICAL HISTORY:  Anemia     Anxiety     Depression no suicide ideation    Fibromyalgia     History of chemotherapy for RA [id number]    History of food poisoning deliberate    OA (osteoarthritis)     Obesity     PNA (pneumonia)     PTSD (post-traumatic stress disorder)     Rheumatoid arthritis     Sciatica ?     PAST MEDICAL HISTORY:  Anemia     Anxiety     Depression no suicide ideation    Fibromyalgia     History of chemotherapy for RA 6150-6967    History of food poisoning deliberate    OA (osteoarthritis)     Obesity     PNA (pneumonia)     PTSD (post-traumatic stress disorder)     Rheumatoid arthritis     Sciatica ?

## 2023-12-05 NOTE — H&P PST ADULT - NSICDXPASTSURGICALHX_GEN_ALL_CORE_FT
PAST SURGICAL HISTORY:  Colon polyp removed 2019    H/O Achilles tendon repair     History of cholecystectomy 2009    History of dilatation and curettage April 2018    History of ovarian cystectomy Right side by vertical insion    History of surgery gastric bypass 2003    History of total knee replacement, left 3/31/21    S/P hip replacement, right     S/P left knee arthroscopy bilateral knee arthroscopy 2015

## 2023-12-05 NOTE — H&P PST ADULT - PRIMARY CARE PROVIDER
saad chaney     [facility]  luther saad chaney     [facility]  luther

## 2023-12-05 NOTE — H&P PST ADULT - REASON FOR ADMISSION
[facility] Carlton Shields  Confirmed Surgery DateTime: 12- - 0:00PAST DateTime: 12- - 8:15Procedure: Right Total Knee Replacement  ERP?: YesLaterality: RightLength of Procedure: 120 Minutes  Anesthesia Type: Regional [facility] Carl[facility]  Confirmed Surgery DateTime: 12- - 0:00PAST DateTime: 12- - 8:15Procedure: Right Total Knee Replacement  ERP?: YesLaterality: RightLength of Procedure: 120 Minutes  Anesthesia Type: Regional

## 2023-12-06 DIAGNOSIS — M17.11 UNILATERAL PRIMARY OSTEOARTHRITIS, RIGHT KNEE: ICD-10-CM

## 2023-12-06 DIAGNOSIS — Z01.818 ENCOUNTER FOR OTHER PREPROCEDURAL EXAMINATION: ICD-10-CM

## 2023-12-22 ENCOUNTER — APPOINTMENT (OUTPATIENT)
Dept: ORTHOPEDIC SURGERY | Facility: HOSPITAL | Age: 63
End: 2023-12-22

## 2024-01-11 ENCOUNTER — APPOINTMENT (OUTPATIENT)
Dept: ORTHOPEDIC SURGERY | Facility: CLINIC | Age: 64
End: 2024-01-11

## 2024-04-16 ENCOUNTER — RESULT REVIEW (OUTPATIENT)
Age: 64
End: 2024-04-16

## 2024-04-16 ENCOUNTER — OUTPATIENT (OUTPATIENT)
Dept: OUTPATIENT SERVICES | Facility: HOSPITAL | Age: 64
LOS: 1 days | End: 2024-04-16
Payer: MEDICARE

## 2024-04-16 VITALS
HEART RATE: 76 BPM | SYSTOLIC BLOOD PRESSURE: 134 MMHG | RESPIRATION RATE: 16 BRPM | WEIGHT: 169.98 LBS | DIASTOLIC BLOOD PRESSURE: 76 MMHG | OXYGEN SATURATION: 98 % | HEIGHT: 66 IN | TEMPERATURE: 97 F

## 2024-04-16 DIAGNOSIS — Z98.890 OTHER SPECIFIED POSTPROCEDURAL STATES: Chronic | ICD-10-CM

## 2024-04-16 DIAGNOSIS — Z01.818 ENCOUNTER FOR OTHER PREPROCEDURAL EXAMINATION: ICD-10-CM

## 2024-04-16 DIAGNOSIS — M17.11 UNILATERAL PRIMARY OSTEOARTHRITIS, RIGHT KNEE: ICD-10-CM

## 2024-04-16 DIAGNOSIS — K63.5 POLYP OF COLON: Chronic | ICD-10-CM

## 2024-04-16 DIAGNOSIS — Z96.641 PRESENCE OF RIGHT ARTIFICIAL HIP JOINT: Chronic | ICD-10-CM

## 2024-04-16 DIAGNOSIS — Z96.652 PRESENCE OF LEFT ARTIFICIAL KNEE JOINT: Chronic | ICD-10-CM

## 2024-04-16 LAB
A1C WITH ESTIMATED AVERAGE GLUCOSE RESULT: 5.6 % — SIGNIFICANT CHANGE UP (ref 4–5.6)
ALBUMIN SERPL ELPH-MCNC: 4.2 G/DL — SIGNIFICANT CHANGE UP (ref 3.5–5.2)
ALP SERPL-CCNC: 102 U/L — SIGNIFICANT CHANGE UP (ref 30–115)
ALT FLD-CCNC: 14 U/L — SIGNIFICANT CHANGE UP (ref 0–41)
ANION GAP SERPL CALC-SCNC: 13 MMOL/L — SIGNIFICANT CHANGE UP (ref 7–14)
APTT BLD: 37.7 SEC — SIGNIFICANT CHANGE UP (ref 27–39.2)
AST SERPL-CCNC: 25 U/L — SIGNIFICANT CHANGE UP (ref 0–41)
BASOPHILS # BLD AUTO: 0.03 K/UL — SIGNIFICANT CHANGE UP (ref 0–0.2)
BASOPHILS NFR BLD AUTO: 0.9 % — SIGNIFICANT CHANGE UP (ref 0–1)
BILIRUB SERPL-MCNC: 0.3 MG/DL — SIGNIFICANT CHANGE UP (ref 0.2–1.2)
BLD GP AB SCN SERPL QL: SIGNIFICANT CHANGE UP
BUN SERPL-MCNC: 23 MG/DL — HIGH (ref 10–20)
CALCIUM SERPL-MCNC: 9.8 MG/DL — SIGNIFICANT CHANGE UP (ref 8.4–10.5)
CHLORIDE SERPL-SCNC: 106 MMOL/L — SIGNIFICANT CHANGE UP (ref 98–110)
CO2 SERPL-SCNC: 23 MMOL/L — SIGNIFICANT CHANGE UP (ref 17–32)
CREAT SERPL-MCNC: 1.2 MG/DL — SIGNIFICANT CHANGE UP (ref 0.7–1.5)
EGFR: 52 ML/MIN/1.73M2 — LOW
EOSINOPHIL # BLD AUTO: 0.06 K/UL — SIGNIFICANT CHANGE UP (ref 0–0.7)
EOSINOPHIL NFR BLD AUTO: 1.9 % — SIGNIFICANT CHANGE UP (ref 0–8)
ESTIMATED AVERAGE GLUCOSE: 114 MG/DL — SIGNIFICANT CHANGE UP (ref 68–114)
GLUCOSE SERPL-MCNC: 97 MG/DL — SIGNIFICANT CHANGE UP (ref 70–99)
HCT VFR BLD CALC: 32.8 % — LOW (ref 37–47)
HGB BLD-MCNC: 10.6 G/DL — LOW (ref 12–16)
IMM GRANULOCYTES NFR BLD AUTO: 0 % — LOW (ref 0.1–0.3)
INR BLD: 0.89 RATIO — SIGNIFICANT CHANGE UP (ref 0.65–1.3)
LYMPHOCYTES # BLD AUTO: 0.7 K/UL — LOW (ref 1.2–3.4)
LYMPHOCYTES # BLD AUTO: 21.8 % — SIGNIFICANT CHANGE UP (ref 20.5–51.1)
MCHC RBC-ENTMCNC: 30.2 PG — SIGNIFICANT CHANGE UP (ref 27–31)
MCHC RBC-ENTMCNC: 32.3 G/DL — SIGNIFICANT CHANGE UP (ref 32–37)
MCV RBC AUTO: 93.4 FL — SIGNIFICANT CHANGE UP (ref 81–99)
MONOCYTES # BLD AUTO: 0.44 K/UL — SIGNIFICANT CHANGE UP (ref 0.1–0.6)
MONOCYTES NFR BLD AUTO: 13.7 % — HIGH (ref 1.7–9.3)
MRSA PCR RESULT.: NEGATIVE — SIGNIFICANT CHANGE UP
NEUTROPHILS # BLD AUTO: 1.98 K/UL — SIGNIFICANT CHANGE UP (ref 1.4–6.5)
NEUTROPHILS NFR BLD AUTO: 61.7 % — SIGNIFICANT CHANGE UP (ref 42.2–75.2)
NRBC # BLD: 0 /100 WBCS — SIGNIFICANT CHANGE UP (ref 0–0)
PLATELET # BLD AUTO: 240 K/UL — SIGNIFICANT CHANGE UP (ref 130–400)
PMV BLD: 9.9 FL — SIGNIFICANT CHANGE UP (ref 7.4–10.4)
POTASSIUM SERPL-MCNC: 4.7 MMOL/L — SIGNIFICANT CHANGE UP (ref 3.5–5)
POTASSIUM SERPL-SCNC: 4.7 MMOL/L — SIGNIFICANT CHANGE UP (ref 3.5–5)
PROT SERPL-MCNC: 7 G/DL — SIGNIFICANT CHANGE UP (ref 6–8)
PROTHROM AB SERPL-ACNC: 10.1 SEC — SIGNIFICANT CHANGE UP (ref 9.95–12.87)
RBC # BLD: 3.51 M/UL — LOW (ref 4.2–5.4)
RBC # FLD: 13.4 % — SIGNIFICANT CHANGE UP (ref 11.5–14.5)
SODIUM SERPL-SCNC: 142 MMOL/L — SIGNIFICANT CHANGE UP (ref 135–146)
WBC # BLD: 3.21 K/UL — LOW (ref 4.8–10.8)
WBC # FLD AUTO: 3.21 K/UL — LOW (ref 4.8–10.8)

## 2024-04-16 PROCEDURE — 85610 PROTHROMBIN TIME: CPT

## 2024-04-16 PROCEDURE — 72170 X-RAY EXAM OF PELVIS: CPT

## 2024-04-16 PROCEDURE — 99214 OFFICE O/P EST MOD 30 MIN: CPT | Mod: 25

## 2024-04-16 PROCEDURE — 86850 RBC ANTIBODY SCREEN: CPT

## 2024-04-16 PROCEDURE — 80053 COMPREHEN METABOLIC PANEL: CPT

## 2024-04-16 PROCEDURE — 86900 BLOOD TYPING SEROLOGIC ABO: CPT

## 2024-04-16 PROCEDURE — 73562 X-RAY EXAM OF KNEE 3: CPT | Mod: 26,RT

## 2024-04-16 PROCEDURE — 87641 MR-STAPH DNA AMP PROBE: CPT

## 2024-04-16 PROCEDURE — 93010 ELECTROCARDIOGRAM REPORT: CPT

## 2024-04-16 PROCEDURE — 73562 X-RAY EXAM OF KNEE 3: CPT | Mod: RT

## 2024-04-16 PROCEDURE — 85025 COMPLETE CBC W/AUTO DIFF WBC: CPT

## 2024-04-16 PROCEDURE — 72170 X-RAY EXAM OF PELVIS: CPT | Mod: 26

## 2024-04-16 PROCEDURE — 93005 ELECTROCARDIOGRAM TRACING: CPT

## 2024-04-16 PROCEDURE — 87640 STAPH A DNA AMP PROBE: CPT

## 2024-04-16 PROCEDURE — 85730 THROMBOPLASTIN TIME PARTIAL: CPT

## 2024-04-16 PROCEDURE — 86901 BLOOD TYPING SEROLOGIC RH(D): CPT

## 2024-04-16 PROCEDURE — 83036 HEMOGLOBIN GLYCOSYLATED A1C: CPT

## 2024-04-16 PROCEDURE — 36415 COLL VENOUS BLD VENIPUNCTURE: CPT

## 2024-04-16 NOTE — H&P PST ADULT - NSICDXPASTMEDICALHX_GEN_ALL_CORE_FT
PAST MEDICAL HISTORY:  Anemia     Anxiety     Depression no suicide ideation    Fibromyalgia     History of chemotherapy for RA [id number]    History of food poisoning deliberate    OA (osteoarthritis)     PNA (pneumonia)     PTSD (post-traumatic stress disorder)     Rheumatoid arthritis     Sciatica ?     02-Oct-2023 09:59

## 2024-04-16 NOTE — H&P PST ADULT - HISTORY OF PRESENT ILLNESS
64 Y/O FEMALE PT TO PAST WITH HX oa              PT NOW FOR SCHEDULED PROCEDURE. PT DENIES ANY CP SOB PALP COUGH DYSURIA FEVER URI. PT ABLE TO LUISITO 1-2 FOS W/O SOB  Anesthesia Alert  NO--Difficult Airway  NO--History of neck surgery or radiation  NO--Limited ROM of neck  NO--History of Malignant hyperthermia  NO--Personal or family history of Pseudocholinesterase deficiency.  NO--Prior Anesthesia Complication  NO--Latex Allergy  NO--Loose teeth  NO--History of Rheumatoid Arthritis  NO--TING  NO--Bleeding risk  NO--Other_____   62 Y/O FEMALE PT TO PAST WITH HX OA. PT C/O PAIN TO RIGHT KNEE FOR PAST 5 YRS, INCREASING IN PAIN           PT NOW FOR SCHEDULED PROCEDURE ( RIGHT TKR) . PT DENIES ANY CP SOB PALP COUGH DYSURIA FEVER URI.   Anesthesia Alert  RA- FULL ROM NECK   NO--History of neck surgery or radiation  NO--Limited ROM of neck  NO--History of Malignant hyperthermia  NO--Personal or family history of Pseudocholinesterase deficiency.  NO--Prior Anesthesia Complication  NO--Latex Allergy  NO--Loose teeth  NO--History of Rheumatoid Arthritis  NO--TING  NO--Bleeding risk  NO--Other_____  RCRI CLASS I  DASI 6.45 METS 64 Y/O FEMALE PT TO PAST WITH HX OA. PT C/O PAIN TO RIGHT KNEE , SHARP FOR PAST 5 YRS, INCREASING IN PAIN PAST YEAR  PT NOW FOR SCHEDULED PROCEDURE ( RIGHT TKR) . PT DENIES ANY CP SOB PALP COUGH DYSURIA FEVER URI.   Anesthesia Alert  + RA  RA- FULL ROM NECK   NO--History of neck surgery or radiation  NO--Limited ROM of neck  NO--History of Malignant hyperthermia  NO--Personal or family history of Pseudocholinesterase deficiency.  NO--Prior Anesthesia Complication  NO--Latex Allergy  NO--Loose teeth  NO--TING  NO--Bleeding risk  NO--Other_____  RCRI CLASS I  DASI 6.45 METS

## 2024-04-17 DIAGNOSIS — M17.11 UNILATERAL PRIMARY OSTEOARTHRITIS, RIGHT KNEE: ICD-10-CM

## 2024-04-17 DIAGNOSIS — Z01.818 ENCOUNTER FOR OTHER PREPROCEDURAL EXAMINATION: ICD-10-CM

## 2024-04-17 RX ORDER — ACETAMINOPHEN 500 MG
1000 TABLET ORAL ONCE
Refills: 0 | Status: COMPLETED | OUTPATIENT
Start: 2024-04-29 | End: 2024-04-29

## 2024-04-23 NOTE — ASU PREOP CHECKLIST - ALLERGY BAND ON
done Oxybutynin Counseling:  I discussed with the patient the risks of oxybutynin including but not limited to skin rash, drowsiness, dry mouth, difficulty urinating, and blurred vision. Mirvaso Counseling: Mirvaso is a topical medication which can decrease superficial blood flow where applied. Side effects are uncommon and include stinging, redness and allergic reactions. Xelwhitneyz Pregnancy And Lactation Text: This medication is Pregnancy Category D and is not considered safe during pregnancy.  The risk during breast feeding is also uncertain. Niacinamide Counseling: I recommended taking niacin or niacinamide, also know as vitamin B3, twice daily. Recent evidence suggests that taking vitamin B3 (500 mg twice daily) can reduce the risk of actinic keratoses and non-melanoma skin cancers. Side effects of vitamin B3 include flushing and headache. Adbry Pregnancy And Lactation Text: It is unknown if this medication will adversely affect pregnancy or breast feeding. Cyclophosphamide Counseling:  I discussed with the patient the risks of cyclophosphamide including but not limited to hair loss, hormonal abnormalities, decreased fertility, abdominal pain, diarrhea, nausea and vomiting, bone marrow suppression and infection. The patient understands that monitoring is required while taking this medication. Wartpeel Counseling:  I discussed with the patient the risks of Wartpeel including but not limited to erythema, scaling, itching, weeping, crusting, and pain. Doxepin Counseling:  Patient advised that the medication is sedating and not to drive a car after taking this medication. Patient informed of potential adverse effects including but not limited to dry mouth, urinary retention, and blurry vision.  The patient verbalized understanding of the proper use and possible adverse effects of doxepin.  All of the patient's questions and concerns were addressed. Methotrexate Pregnancy And Lactation Text: This medication is Pregnancy Category X and is known to cause fetal harm. This medication is excreted in breast milk. Bactrim Pregnancy And Lactation Text: This medication is Pregnancy Category D and is known to cause fetal risk.  It is also excreted in breast milk. Minocycline Pregnancy And Lactation Text: This medication is Pregnancy Category D and not consider safe during pregnancy. It is also excreted in breast milk. Tranexamic Acid Pregnancy And Lactation Text: It is unknown if this medication is safe during pregnancy or breast feeding. Thalidomide Counseling: I discussed with the patient the risks of thalidomide including but not limited to birth defects, anxiety, weakness, chest pain, dizziness, cough and severe allergy. Calcipotriene Counseling:  I discussed with the patient the risks of calcipotriene including but not limited to erythema, scaling, itching, and irritation. Imiquimod Counseling:  I discussed with the patient the risks of imiquimod including but not limited to erythema, scaling, itching, weeping, crusting, and pain.  Patient understands that the inflammatory response to imiquimod is variable from person to person and was educated regarded proper titration schedule.  If flu-like symptoms develop, patient knows to discontinue the medication and contact us. Cellcept Pregnancy And Lactation Text: This medication is Pregnancy Category D and isn't considered safe during pregnancy. It is unknown if this medication is excreted in breast milk. Elidel Pregnancy And Lactation Text: This medication is Pregnancy Category C. It is unknown if this medication is excreted in breast milk. Methotrexate Counseling:  Patient counseled regarding adverse effects of methotrexate including but not limited to nausea, vomiting, abnormalities in liver function tests. Patients may develop mouth sores, rash, diarrhea, and abnormalities in blood counts. The patient understands that monitoring is required including LFT's and blood counts.  There is a rare possibility of scarring of the liver and lung problems that can occur when taking methotrexate. Persistent nausea, loss of appetite, pale stools, dark urine, cough, and shortness of breath should be reported immediately. Patient advised to discontinue methotrexate treatment at least three months before attempting to become pregnant.  I discussed the need for folate supplements while taking methotrexate.  These supplements can decrease side effects during methotrexate treatment. The patient verbalized understanding of the proper use and possible adverse effects of methotrexate.  All of the patient's questions and concerns were addressed. Erivedge Pregnancy And Lactation Text: This medication is Pregnancy Category X and is absolutely contraindicated during pregnancy. It is unknown if it is excreted in breast milk. Opzelura Counseling:  I discussed with the patient the risks of Opzelura including but not limited to nasopharngitis, bronchitis, ear infection, eosinophila, hives, diarrhea, folliculitis, tonsillitis, and rhinorrhea.  Taken orally, this medication has been linked to serious infections; higher rate of mortality; malignancy and lymphoproliferative disorders; major adverse cardiovascular events; thrombosis; thrombocytopenia, anemia, and neutropenia; and lipid elevations. Libtayo Counseling- I discussed with the patient the risks of Libtayo including but not limited to nausea, vomiting, diarrhea, and bone or muscle pain.  The patient verbalized understanding of the proper use and possible adverse effects of Libtayo.  All of the patient's questions and concerns were addressed. Cimzia Pregnancy And Lactation Text: This medication crosses the placenta but can be considered safe in certain situations. Cimzia may be excreted in breast milk. Xolair Pregnancy And Lactation Text: This medication is Pregnancy Category B and is considered safe during pregnancy. This medication is excreted in breast milk. Nsaids Counseling: NSAID Counseling: I discussed with the patient that NSAIDs should be taken with food. Prolonged use of NSAIDs can result in the development of stomach ulcers.  Patient advised to stop taking NSAIDs if abdominal pain occurs.  The patient verbalized understanding of the proper use and possible adverse effects of NSAIDs.  All of the patient's questions and concerns were addressed. Oral Minoxidil Counseling- I discussed with the patient the risks of oral minoxidil including but not limited to shortness of breath, swelling of the feet or ankles, dizziness, lightheadedness, unwanted hair growth and allergic reaction.  The patient verbalized understanding of the proper use and possible adverse effects of oral minoxidil.  All of the patient's questions and concerns were addressed. Minocycline Counseling: Patient advised regarding possible photosensitivity and discoloration of the teeth, skin, lips, tongue and gums.  Patient instructed to avoid sunlight, if possible.  When exposed to sunlight, patients should wear protective clothing, sunglasses, and sunscreen.  The patient was instructed to call the office immediately if the following severe adverse effects occur:  hearing changes, easy bruising/bleeding, severe headache, or vision changes.  The patient verbalized understanding of the proper use and possible adverse effects of minocycline.  All of the patient's questions and concerns were addressed. Bexarotene Pregnancy And Lactation Text: This medication is Pregnancy Category X and should not be given to women who are pregnant or may become pregnant. This medication should not be used if you are breast feeding. Colchicine Pregnancy And Lactation Text: This medication is Pregnancy Category C and isn't considered safe during pregnancy. It is excreted in breast milk. Adbry Counseling: I discussed with the patient the risks of tralokinumab including but not limited to eye infection and irritation, cold sores, injection site reactions, worsening of asthma, allergic reactions and increased risk of parasitic infection.  Live vaccines should be avoided while taking tralokinumab. The patient understands that monitoring is required and they must alert us or the primary physician if symptoms of infection or other concerning signs are noted. Sski Pregnancy And Lactation Text: This medication is Pregnancy Category D and isn't considered safe during pregnancy. It is excreted in breast milk. Low Dose Naltrexone Counseling- I discussed with the patient the potential risks and side effects of low dose naltrexone including but not limited to: more vivid dreams, headaches, nausea, vomiting, abdominal pain, fatigue, dizziness, and anxiety. Infliximab Counseling:  I discussed with the patient the risks of infliximab including but not limited to myelosuppression, immunosuppression, autoimmune hepatitis, demyelinating diseases, lymphoma, and serious infections.  The patient understands that monitoring is required including a PPD at baseline and must alert us or the primary physician if symptoms of infection or other concerning signs are noted. Griseofulvin Pregnancy And Lactation Text: This medication is Pregnancy Category X and is known to cause serious birth defects. It is unknown if this medication is excreted in breast milk but breast feeding should be avoided. Enbrel Pregnancy And Lactation Text: This medication is Pregnancy Category B and is considered safe during pregnancy. It is unknown if this medication is excreted in breast milk. Valtrex Pregnancy And Lactation Text: this medication is Pregnancy Category B and is considered safe during pregnancy. This medication is not directly found in breast milk but it's metabolite acyclovir is present. Cellcept Counseling:  I discussed with the patient the risks of mycophenolate mofetil including but not limited to infection/immunosuppression, GI upset, hypokalemia, hypercholesterolemia, bone marrow suppression, lymphoproliferative disorders, malignancy, GI ulceration/bleed/perforation, colitis, interstitial lung disease, kidney failure, progressive multifocal leukoencephalopathy, and birth defects.  The patient understands that monitoring is required including a baseline creatinine and regular CBC testing. In addition, patient must alert us immediately if symptoms of infection or other concerning signs are noted. High Dose Vitamin A Pregnancy And Lactation Text: High dose vitamin A therapy is contraindicated during pregnancy and breast feeding. Dupixent Counseling: I discussed with the patient the risks of dupilumab including but not limited to eye infection and irritation, cold sores, injection site reactions, worsening of asthma, allergic reactions and increased risk of parasitic infection.  Live vaccines should be avoided while taking dupilumab. Dupilumab will also interact with certain medications such as warfarin and cyclosporine. The patient understands that monitoring is required and they must alert us or the primary physician if symptoms of infection or other concerning signs are noted. Dapsone Counseling: I discussed with the patient the risks of dapsone including but not limited to hemolytic anemia, agranulocytosis, rashes, methemoglobinemia, kidney failure, peripheral neuropathy, headaches, GI upset, and liver toxicity.  Patients who start dapsone require monitoring including baseline LFTs and weekly CBCs for the first month, then every month thereafter.  The patient verbalized understanding of the proper use and possible adverse effects of dapsone.  All of the patient's questions and concerns were addressed. Rhofade Counseling: Rhofade is a topical medication which can decrease superficial blood flow where applied. Side effects are uncommon and include stinging, redness and allergic reactions. Azelaic Acid Pregnancy And Lactation Text: This medication is considered safe during pregnancy and breast feeding. Cibinqo Pregnancy And Lactation Text: It is unknown if this medication will adversely affect pregnancy or breast feeding.  You should not take this medication if you are currently pregnant or planning a pregnancy or while breastfeeding. Taltz Pregnancy And Lactation Text: The risk during pregnancy and breastfeeding is uncertain with this medication. Dupixent Pregnancy And Lactation Text: This medication likely crosses the placenta but the risk for the fetus is uncertain. This medication is excreted in breast milk. Solaraze Pregnancy And Lactation Text: This medication is Pregnancy Category B and is considered safe. There is some data to suggest avoiding during the third trimester. It is unknown if this medication is excreted in breast milk. Isotretinoin Counseling: Patient should get monthly blood tests, not donate blood, not drive at night if vision affected, not share medication, and not undergo elective surgery for 6 months after tx completed. Side effects reviewed, pt to contact office should one occur. Rhofade Pregnancy And Lactation Text: This medication has not been assigned a Pregnancy Risk Category. It is unknown if the medication is excreted in breast milk. Nsaids Pregnancy And Lactation Text: These medications are considered safe up to 30 weeks gestation. It is excreted in breast milk. Minoxidil Counseling: Minoxidil is a topical medication which can increase blood flow where it is applied. It is uncertain how this medication increases hair growth. Side effects are uncommon and include stinging and allergic reactions. Clofazimine Counseling:  I discussed with the patient the risks of clofazimine including but not limited to skin and eye pigmentation, liver damage, nausea/vomiting, gastrointestinal bleeding and allergy. Oral Minoxidil Pregnancy And Lactation Text: This medication should only be used when clearly needed if you are pregnant, attempting to become pregnant or breast feeding. Tetracycline Counseling: Patient counseled regarding possible photosensitivity and increased risk for sunburn.  Patient instructed to avoid sunlight, if possible.  When exposed to sunlight, patients should wear protective clothing, sunglasses, and sunscreen.  The patient was instructed to call the office immediately if the following severe adverse effects occur:  hearing changes, easy bruising/bleeding, severe headache, or vision changes.  The patient verbalized understanding of the proper use and possible adverse effects of tetracycline.  All of the patient's questions and concerns were addressed. Patient understands to avoid pregnancy while on therapy due to potential birth defects. Valtrex Counseling: I discussed with the patient the risks of valacyclovir including but not limited to kidney damage, nausea, vomiting and severe allergy.  The patient understands that if the infection seems to be worsening or is not improving, they are to call. Carac Pregnancy And Lactation Text: This medication is Pregnancy Category X and contraindicated in pregnancy and in women who may become pregnant. It is unknown if this medication is excreted in breast milk. Metronidazole Pregnancy And Lactation Text: This medication is Pregnancy Category B and considered safe during pregnancy.  It is also excreted in breast milk. Protopic Pregnancy And Lactation Text: This medication is Pregnancy Category C. It is unknown if this medication is excreted in breast milk when applied topically. Ivermectin Counseling:  Patient instructed to take medication on an empty stomach with a full glass of water.  Patient informed of potential adverse effects including but not limited to nausea, diarrhea, dizziness, itching, and swelling of the extremities or lymph nodes.  The patient verbalized understanding of the proper use and possible adverse effects of ivermectin.  All of the patient's questions and concerns were addressed. Topical Retinoid counseling:  Patient advised to apply a pea-sized amount only at bedtime and wait 30 minutes after washing their face before applying.  If too drying, patient may add a non-comedogenic moisturizer. The patient verbalized understanding of the proper use and possible adverse effects of retinoids.  All of the patient's questions and concerns were addressed. Detail Level: Detailed High Dose Vitamin A Counseling: Side effects reviewed, pt to contact office should one occur. Dapsone Pregnancy And Lactation Text: This medication is Pregnancy Category C and is not considered safe during pregnancy or breast feeding. Cyclophosphamide Pregnancy And Lactation Text: This medication is Pregnancy Category D and it isn't considered safe during pregnancy. This medication is excreted in breast milk. Stelara Counseling:  I discussed with the patient the risks of ustekinumab including but not limited to immunosuppression, malignancy, posterior leukoencephalopathy syndrome, and serious infections.  The patient understands that monitoring is required including a PPD at baseline and must alert us or the primary physician if symptoms of infection or other concerning signs are noted. Drysol Counseling:  I discussed with the patient the risks of drysol/aluminum chloride including but not limited to skin rash, itching, irritation, burning. Sotyktu Pregnancy And Lactation Text: There is insufficient data to evaluate whether or not Sotyktu is safe to use during pregnancy.   It is not known if Sotyktu passes into breast milk and whether or not it is safe to use when breastfeeding.   Hydroxychloroquine Pregnancy And Lactation Text: This medication has been shown to cause fetal harm but it isn't assigned a Pregnancy Risk Category. There are small amounts excreted in breast milk. Albendazole Pregnancy And Lactation Text: This medication is Pregnancy Category C and it isn't known if it is safe during pregnancy. It is also excreted in breast milk. Zyclara Counseling:  I discussed with the patient the risks of imiquimod including but not limited to erythema, scaling, itching, weeping, crusting, and pain.  Patient understands that the inflammatory response to imiquimod is variable from person to person and was educated regarded proper titration schedule.  If flu-like symptoms develop, patient knows to discontinue the medication and contact us. Otezla Pregnancy And Lactation Text: This medication is Pregnancy Category C and it isn't known if it is safe during pregnancy. It is unknown if it is excreted in breast milk. Prednisone Pregnancy And Lactation Text: This medication is Pregnancy Category C and it isn't know if it is safe during pregnancy. This medication is excreted in breast milk. Clindamycin Pregnancy And Lactation Text: This medication can be used in pregnancy if certain situations. Clindamycin is also present in breast milk. Protopic Counseling: Patient may experience a mild burning sensation during topical application. Protopic is not approved in children less than 2 years of age. There have been case reports of hematologic and skin malignancies in patients using topical calcineurin inhibitors although causality is questionable. Olumiant Pregnancy And Lactation Text: Based on animal studies, Olumiant may cause embryo-fetal harm when administered to pregnant women.  The medication should not be used in pregnancy.  Breastfeeding is not recommended during treatment. Doxycycline Counseling:  Patient counseled regarding possible photosensitivity and increased risk for sunburn.  Patient instructed to avoid sunlight, if possible.  When exposed to sunlight, patients should wear protective clothing, sunglasses, and sunscreen.  The patient was instructed to call the office immediately if the following severe adverse effects occur:  hearing changes, easy bruising/bleeding, severe headache, or vision changes.  The patient verbalized understanding of the proper use and possible adverse effects of doxycycline.  All of the patient's questions and concerns were addressed. Otezla Counseling: The side effects of Otezla were discussed with the patient, including but not limited to worsening or new depression, weight loss, diarrhea, nausea, upper respiratory tract infection, and headache. Patient instructed to call the office should any adverse effect occur.  The patient verbalized understanding of the proper use and possible adverse effects of Otezla.  All the patient's questions and concerns were addressed. 5-Fu Counseling: 5-Fluorouracil Counseling:  I discussed with the patient the risks of 5-fluorouracil including but not limited to erythema, scaling, itching, weeping, crusting, and pain. Use Enhanced Medication Counseling?: No Hydroquinone Counseling:  Patient advised that medication may result in skin irritation, lightening (hypopigmentation), dryness, and burning.  In the event of skin irritation, the patient was advised to reduce the amount of the drug applied or use it less frequently.  Rarely, spots that are treated with hydroquinone can become darker (pseudoochronosis).  Should this occur, patient instructed to stop medication and call the office. The patient verbalized understanding of the proper use and possible adverse effects of hydroquinone.  All of the patient's questions and concerns were addressed. Opioid Pregnancy And Lactation Text: These medications can lead to premature delivery and should be avoided during pregnancy. These medications are also present in breast milk in small amounts. Elidel Counseling: Patient may experience a mild burning sensation during topical application. Elidel is not approved in children less than 2 years of age. There have been case reports of hematologic and skin malignancies in patients using topical calcineurin inhibitors although causality is questionable. Calcipotriene Pregnancy And Lactation Text: This medication has not been proven safe during pregnancy. It is unknown if this medication is excreted in breast milk. Erythromycin Counseling:  I discussed with the patient the risks of erythromycin including but not limited to GI upset, allergic reaction, drug rash, diarrhea, increase in liver enzymes, and yeast infections. Topical Ketoconazole Counseling: Patient counseled that this medication may cause skin irritation or allergic reactions.  In the event of skin irritation, the patient was advised to reduce the amount of the drug applied or use it less frequently.   The patient verbalized understanding of the proper use and possible adverse effects of ketoconazole.  All of the patient's questions and concerns were addressed. Glycopyrrolate Pregnancy And Lactation Text: This medication is Pregnancy Category B and is considered safe during pregnancy. It is unknown if it is excreted breast milk. Prednisone Counseling:  I discussed with the patient the risks of prolonged use of prednisone including but not limited to weight gain, insomnia, osteoporosis, mood changes, diabetes, susceptibility to infection, glaucoma and high blood pressure.  In cases where prednisone use is prolonged, patients should be monitored with blood pressure checks, serum glucose levels and an eye exam.  Additionally, the patient may need to be placed on GI prophylaxis, PCP prophylaxis, and calcium and vitamin D supplementation and/or a bisphosphonate.  The patient verbalized understanding of the proper use and the possible adverse effects of prednisone.  All of the patient's questions and concerns were addressed. Cimzia Counseling:  I discussed with the patient the risks of Cimzia including but not limited to immunosuppression, allergic reactions and infections.  The patient understands that monitoring is required including a PPD at baseline and must alert us or the primary physician if symptoms of infection or other concerning signs are noted. Griseofulvin Counseling:  I discussed with the patient the risks of griseofulvin including but not limited to photosensitivity, cytopenia, liver damage, nausea/vomiting and severe allergy.  The patient understands that this medication is best absorbed when taken with a fatty meal (e.g., ice cream or french fries). Winlevi Counseling:  I discussed with the patient the risks of topical clascoterone including but not limited to erythema, scaling, itching, and stinging. Patient voiced their understanding. Enbrel Counseling:  I discussed with the patient the risks of etanercept including but not limited to myelosuppression, immunosuppression, autoimmune hepatitis, demyelinating diseases, lymphoma, and infections.  The patient understands that monitoring is required including a PPD at baseline and must alert us or the primary physician if symptoms of infection or other concerning signs are noted. Niacinamide Pregnancy And Lactation Text: These medications are considered safe during pregnancy. Colchicine Counseling:  Patient counseled regarding adverse effects including but not limited to stomach upset (nausea, vomiting, stomach pain, or diarrhea).  Patient instructed to limit alcohol consumption while taking this medication.  Colchicine may reduce blood counts especially with prolonged use.  The patient understands that monitoring of kidney function and blood counts may be required, especially at baseline. The patient verbalized understanding of the proper use and possible adverse effects of colchicine.  All of the patient's questions and concerns were addressed. Azithromycin Pregnancy And Lactation Text: This medication is considered safe during pregnancy and is also secreted in breast milk. Low Dose Naltrexone Pregnancy And Lactation Text: Naltrexone is pregnancy category C.  There have been no adequate and well-controlled studies in pregnant women.  It should be used in pregnancy only if the potential benefit justifies the potential risk to the fetus.   Limited data indicates that naltrexone is minimally excreted into breastmilk. Ketoconazole Counseling:   Patient counseled regarding improving absorption with orange juice.  Adverse effects include but are not limited to breast enlargement, headache, diarrhea, nausea, upset stomach, liver function test abnormalities, taste disturbance, and stomach pain.  There is a rare possibility of liver failure that can occur when taking ketoconazole. The patient understands that monitoring of LFTs may be required, especially at baseline. The patient verbalized understanding of the proper use and possible adverse effects of ketoconazole.  All of the patient's questions and concerns were addressed. Tremfya Counseling: I discussed with the patient the risks of guselkumab including but not limited to immunosuppression, serious infections, and drug reactions.  The patient understands that monitoring is required including a PPD at baseline and must alert us or the primary physician if symptoms of infection or other concerning signs are noted. Birth Control Pills Counseling: Birth Control Pill Counseling: I discussed with the patient the potential side effects of OCPs including but not limited to increased risk of stroke, heart attack, thrombophlebitis, deep venous thrombosis, hepatic adenomas, breast changes, GI upset, headaches, and depression.  The patient verbalized understanding of the proper use and possible adverse effects of OCPs. All of the patient's questions and concerns were addressed. Picato Counseling:  I discussed with the patient the risks of Picato including but not limited to erythema, scaling, itching, weeping, crusting, and pain. Olumiant Counseling: I discussed with the patient the risks of Olumiant therapy including but not limited to upper respiratory tract infections, shingles, cold sores, and nausea. Live vaccines should be avoided.  This medication has been linked to serious infections; higher rate of mortality; malignancy and lymphoproliferative disorders; major adverse cardiovascular events; thrombosis; gastrointestinal perforations; neutropenia; lymphopenia; anemia; liver enzyme elevations; and lipid elevations. Dutasteride Male Counseling: Dustasteride Counseling:  I discussed with the patient the risks of use of dutasteride including but not limited to decreased libido, decreased ejaculate volume, and gynecomastia. Women who can become pregnant should not handle medication.  All of the patient's questions and concerns were addressed. Hydroxyzine Counseling: Patient advised that the medication is sedating and not to drive a car after taking this medication.  Patient informed of potential adverse effects including but not limited to dry mouth, urinary retention, and blurry vision.  The patient verbalized understanding of the proper use and possible adverse effects of hydroxyzine.  All of the patient's questions and concerns were addressed. Isotretinoin Pregnancy And Lactation Text: This medication is Pregnancy Category X and is considered extremely dangerous during pregnancy. It is unknown if it is excreted in breast milk. Erythromycin Pregnancy And Lactation Text: This medication is Pregnancy Category B and is considered safe during pregnancy. It is also excreted in breast milk. Opzelura Pregnancy And Lactation Text: There is insufficient data to evaluate drug-associated risk for major birth defects, miscarriage, or other adverse maternal or fetal outcomes.  There is a pregnancy registry that monitors pregnancy outcomes in pregnant persons exposed to the medication during pregnancy.  It is unknown if this medication is excreted in breast milk.  Do not breastfeed during treatment and for about 4 weeks after the last dose. Gabapentin Counseling: I discussed with the patient the risks of gabapentin including but not limited to dizziness, somnolence, fatigue and ataxia. Hydroxychloroquine Counseling:  I discussed with the patient that a baseline ophthalmologic exam is needed at the start of therapy and every year thereafter while on therapy. A CBC may also be warranted for monitoring.  The side effects of this medication were discussed with the patient, including but not limited to agranulocytosis, aplastic anemia, seizures, rashes, retinopathy, and liver toxicity. Patient instructed to call the office should any adverse effect occur.  The patient verbalized understanding of the proper use and possible adverse effects of Plaquenil.  All the patient's questions and concerns were addressed. Cibinqo Counseling: I discussed with the patient the risks of Cibinqo therapy including but not limited to common cold, nausea, headache, cold sores, increased blood CPK levels, dizziness, UTIs, fatigue, acne, and vomitting. Live vaccines should be avoided.  This medication has been linked to serious infections; higher rate of mortality; malignancy and lymphoproliferative disorders; major adverse cardiovascular events; thrombosis; thrombocytopenia and lymphopenia; lipid elevations; and retinal detachment. Rituxan Pregnancy And Lactation Text: This medication is Pregnancy Category C and it isn't know if it is safe during pregnancy. It is unknown if this medication is excreted in breast milk but similar antibodies are known to be excreted. Azathioprine Counseling:  I discussed with the patient the risks of azathioprine including but not limited to myelosuppression, immunosuppression, hepatotoxicity, lymphoma, and infections.  The patient understands that monitoring is required including baseline LFTs, Creatinine, possible TPMP genotyping and weekly CBCs for the first month and then every 2 weeks thereafter.  The patient verbalized understanding of the proper use and possible adverse effects of azathioprine.  All of the patient's questions and concerns were addressed. Terbinafine Counseling: Patient counseling regarding adverse effects of terbinafine including but not limited to headache, diarrhea, rash, upset stomach, liver function test abnormalities, itching, taste/smell disturbance, nausea, abdominal pain, and flatulence.  There is a rare possibility of liver failure that can occur when taking terbinafine.  The patient understands that a baseline LFT and kidney function test may be required. The patient verbalized understanding of the proper use and possible adverse effects of terbinafine.  All of the patient's questions and concerns were addressed. Terbinafine Pregnancy And Lactation Text: This medication is Pregnancy Category B and is considered safe during pregnancy. It is also excreted in breast milk and breast feeding isn't recommended. Acitretin Counseling:  I discussed with the patient the risks of acitretin including but not limited to hair loss, dry lips/skin/eyes, liver damage, hyperlipidemia, depression/suicidal ideation, photosensitivity.  Serious rare side effects can include but are not limited to pancreatitis, pseudotumor cerebri, bony changes, clot formation/stroke/heart attack.  Patient understands that alcohol is contraindicated since it can result in liver toxicity and significantly prolong the elimination of the drug by many years. Eucrisa Pregnancy And Lactation Text: This medication has not been assigned a Pregnancy Risk Category but animal studies failed to show danger with the topical medication. It is unknown if the medication is excreted in breast milk. Birth Control Pills Pregnancy And Lactation Text: This medication should be avoided if pregnant and for the first 30 days post-partum. Doxepin Pregnancy And Lactation Text: This medication is Pregnancy Category C and it isn't known if it is safe during pregnancy. It is also excreted in breast milk and breast feeding isn't recommended. Ketoconazole Pregnancy And Lactation Text: This medication is Pregnancy Category C and it isn't know if it is safe during pregnancy. It is also excreted in breast milk and breast feeding isn't recommended. Cephalexin Counseling: I counseled the patient regarding use of cephalexin as an antibiotic for prophylactic and/or therapeutic purposes. Cephalexin (commonly prescribed under brand name Keflex) is a cephalosporin antibiotic which is active against numerous classes of bacteria, including most skin bacteria. Side effects may include nausea, diarrhea, gastrointestinal upset, rash, hives, yeast infections, and in rare cases, hepatitis, kidney disease, seizures, fever, confusion, neurologic symptoms, and others. Patients with severe allergies to penicillin medications are cautioned that there is about a 10% incidence of cross-reactivity with cephalosporins. When possible, patients with penicillin allergies should use alternatives to cephalosporins for antibiotic therapy. Propranolol Pregnancy And Lactation Text: This medication is Pregnancy Category C and it isn't known if it is safe during pregnancy. It is excreted in breast milk. Quinolones Pregnancy And Lactation Text: This medication is Pregnancy Category C and it isn't know if it is safe during pregnancy. It is also excreted in breast milk. Doxycycline Pregnancy And Lactation Text: This medication is Pregnancy Category D and not consider safe during pregnancy. It is also excreted in breast milk but is considered safe for shorter treatment courses. Sotyktu Counseling:  I discussed the most common side effects of Sotyktu including: common cold, sore throat, sinus infections, cold sores, canker sores, folliculitis, and acne.  I also discussed more serious side effects of Sotyktu including but not limited to: serious allergic reactions; increased risk for infections such as TB; cancers such as lymphomas; rhabdomyolysis and elevated CPK; and elevated triglycerides and liver enzymes.  Bactrim Counseling:  I discussed with the patient the risks of sulfa antibiotics including but not limited to GI upset, allergic reaction, drug rash, diarrhea, dizziness, photosensitivity, and yeast infections.  Rarely, more serious reactions can occur including but not limited to aplastic anemia, agranulocytosis, methemoglobinemia, blood dyscrasias, liver or kidney failure, lung infiltrates or desquamative/blistering drug rashes. Xeljanz Counseling: I discussed with the patient the risks of Xeljanz therapy including increased risk of infection, liver issues, headache, diarrhea, or cold symptoms. Live vaccines should be avoided. They were instructed to call if they have any problems. Rinvoq Counseling: I discussed with the patient the risks of Rinvoq therapy including but not limited to upper respiratory tract infections, shingles, cold sores, bronchitis, nausea, cough, fever, acne, and headache. Live vaccines should be avoided.  This medication has been linked to serious infections; higher rate of mortality; malignancy and lymphoproliferative disorders; major adverse cardiovascular events; thrombosis; thrombocytopenia, anemia, and neutropenia; lipid elevations; liver enzyme elevations; and gastrointestinal perforations. Opioid Counseling: I discussed with the patient the potential side effects of opioids including but not limited to addiction, altered mental status, and depression. I stressed avoiding alcohol, benzodiazepines, muscle relaxants and sleep aids unless specifically okayed by a physician. The patient verbalized understanding of the proper use and possible adverse effects of opioids. All of the patient's questions and concerns were addressed. They were instructed to flush the remaining pills down the toilet if they did not need them for pain. Winlevi Pregnancy And Lactation Text: This medication is considered safe during pregnancy and breastfeeding. Rinvoq Pregnancy And Lactation Text: Based on animal studies, Rinvoq may cause embryo-fetal harm when administered to pregnant women.  The medication should not be used in pregnancy.  Breastfeeding is not recommended during treatment and for 6 days after the last dose. Tazorac Pregnancy And Lactation Text: This medication is not safe during pregnancy. It is unknown if this medication is excreted in breast milk. Quinolones Counseling:  I discussed with the patient the risks of fluoroquinolones including but not limited to GI upset, allergic reaction, drug rash, diarrhea, dizziness, photosensitivity, yeast infections, liver function test abnormalities, tendonitis/tendon rupture. Topical Sulfur Applications Counseling: Topical Sulfur Counseling: Patient counseled that this medication may cause skin irritation or allergic reactions.  In the event of skin irritation, the patient was advised to reduce the amount of the drug applied or use it less frequently.   The patient verbalized understanding of the proper use and possible adverse effects of topical sulfur application.  All of the patient's questions and concerns were addressed. Benzoyl Peroxide Pregnancy And Lactation Text: This medication is Pregnancy Category C. It is unknown if benzoyl peroxide is excreted in breast milk. Cephalexin Pregnancy And Lactation Text: This medication is Pregnancy Category B and considered safe during pregnancy.  It is also excreted in breast milk but can be used safely for shorter doses. Topical Clindamycin Counseling: Patient counseled that this medication may cause skin irritation or allergic reactions.  In the event of skin irritation, the patient was advised to reduce the amount of the drug applied or use it less frequently.   The patient verbalized understanding of the proper use and possible adverse effects of clindamycin.  All of the patient's questions and concerns were addressed. Olanzapine Pregnancy And Lactation Text: This medication is pregnancy category C.   There are no adequate and well controlled trials with olanzapine in pregnant females.  Olanzapine should be used during pregnancy only if the potential benefit justifies the potential risk to the fetus.   In a study in lactating healthy women, olanzapine was excreted in breast milk.  It is recommended that women taking olanzapine should not breast feed. Topical Clindamycin Pregnancy And Lactation Text: This medication is Pregnancy Category B and is considered safe during pregnancy. It is unknown if it is excreted in breast milk. Clindamycin Counseling: I counseled the patient regarding use of clindamycin as an antibiotic for prophylactic and/or therapeutic purposes. Clindamycin is active against numerous classes of bacteria, including skin bacteria. Side effects may include nausea, diarrhea, gastrointestinal upset, rash, hives, yeast infections, and in rare cases, colitis. Xolair Counseling:  Patient informed of potential adverse effects including but not limited to fever, muscle aches, rash and allergic reactions.  The patient verbalized understanding of the proper use and possible adverse effects of Xolair.  All of the patient's questions and concerns were addressed. Azelaic Acid Counseling: Patient counseled that medicine may cause skin irritation and to avoid applying near the eyes.  In the event of skin irritation, the patient was advised to reduce the amount of the drug applied or use it less frequently.   The patient verbalized understanding of the proper use and possible adverse effects of azelaic acid.  All of the patient's questions and concerns were addressed. Olanzapine Counseling- I discussed with the patient the common side effects of olanzapine including but are not limited to: lack of energy, dry mouth, increased appetite, sleepiness, tremor, constipation, dizziness, changes in behavior, or restlessness.  Explained that teenagers are more likely to experience headaches, abdominal pain, pain in the arms or legs, tiredness, and sleepiness.  Serious side effects include but are not limited: increased risk of death in elderly patients who are confused, have memory loss, or dementia-related psychosis; hyperglycemia; increased cholesterol and triglycerides; and weight gain. Odomzo Counseling- I discussed with the patient the risks of Odomzo including but not limited to nausea, vomiting, diarrhea, constipation, weight loss, changes in the sense of taste, decreased appetite, muscle spasms, and hair loss.  The patient verbalized understanding of the proper use and possible adverse effects of Odomzo.  All of the patient's questions and concerns were addressed. Rifampin Counseling: I discussed with the patient the risks of rifampin including but not limited to liver damage, kidney damage, red-orange body fluids, nausea/vomiting and severe allergy. Glycopyrrolate Counseling:  I discussed with the patient the risks of glycopyrrolate including but not limited to skin rash, drowsiness, dry mouth, difficulty urinating, and blurred vision. Propranolol Counseling:  I discussed with the patient the risks of propranolol including but not limited to low heart rate, low blood pressure, low blood sugar, restlessness and increased cold sensitivity. They should call the office if they experience any of these side effects. Albendazole Counseling:  I discussed with the patient the risks of albendazole including but not limited to cytopenia, kidney damage, nausea/vomiting and severe allergy.  The patient understands that this medication is being used in an off-label manner. Dutasteride Pregnancy And Lactation Text: This medication is absolutely contraindicated in women, especially during pregnancy and breast feeding. Feminization of male fetuses is possible if taking while pregnant. Finasteride Pregnancy And Lactation Text: This medication is absolutely contraindicated during pregnancy. It is unknown if it is excreted in breast milk. Bexarotene Counseling:  I discussed with the patient the risks of bexarotene including but not limited to hair loss, dry lips/skin/eyes, liver abnormalities, hyperlipidemia, pancreatitis, depression/suicidal ideation, photosensitivity, drug rash/allergic reactions, hypothyroidism, anemia, leukopenia, infection, cataracts, and teratogenicity.  Patient understands that they will need regular blood tests to check lipid profile, liver function tests, white blood cell count, thyroid function tests and pregnancy test if applicable. Tranexamic Acid Counseling:  Patient advised of the small risk of bleeding problems with tranexamic acid. They were also instructed to call if they developed any nausea, vomiting or diarrhea. All of the patient's questions and concerns were addressed. Tazorac Counseling:  Patient advised that medication is irritating and drying.  Patient may need to apply sparingly and wash off after an hour before eventually leaving it on overnight.  The patient verbalized understanding of the proper use and possible adverse effects of tazorac.  All of the patient's questions and concerns were addressed. Cosentyx Counseling:  I discussed with the patient the risks of Cosentyx including but not limited to worsening of Crohn's disease, immunosuppression, allergic reactions and infections.  The patient understands that monitoring is required including a PPD at baseline and must alert us or the primary physician if symptoms of infection or other concerning signs are noted. Spironolactone Pregnancy And Lactation Text: This medication can cause feminization of the male fetus and should be avoided during pregnancy. The active metabolite is also found in breast milk. Cyclosporine Counseling:  I discussed with the patient the risks of cyclosporine including but not limited to hypertension, gingival hyperplasia,myelosuppression, immunosuppression, liver damage, kidney damage, neurotoxicity, lymphoma, and serious infections. The patient understands that monitoring is required including baseline blood pressure, CBC, CMP, lipid panel and uric acid, and then 1-2 times monthly CMP and blood pressure. Hydroxyzine Pregnancy And Lactation Text: This medication is not safe during pregnancy and should not be taken. It is also excreted in breast milk and breast feeding isn't recommended. Siliq Counseling:  I discussed with the patient the risks of Siliq including but not limited to new or worsening depression, suicidal thoughts and behavior, immunosuppression, malignancy, posterior leukoencephalopathy syndrome, and serious infections.  The patient understands that monitoring is required including a PPD at baseline and must alert us or the primary physician if symptoms of infection or other concerning signs are noted. There is also a special program designed to monitor depression which is required with Siliq. Eucrisa Counseling: Patient may experience a mild burning sensation during topical application. Eucrisa is not approved in children less than 3 months of age. Libtayo Pregnancy And Lactation Text: This medication is contraindicated in pregnancy and when breast feeding. SSKI Counseling:  I discussed with the patient the risks of SSKI including but not limited to thyroid abnormalities, metallic taste, GI upset, fever, headache, acne, arthralgias, paraesthesias, lymphadenopathy, easy bleeding, arrhythmias, and allergic reaction. Rifampin Pregnancy And Lactation Text: This medication is Pregnancy Category C and it isn't know if it is safe during pregnancy. It is also excreted in breast milk and should not be used if you are breast feeding. Taltz Counseling: I discussed with the patient the risks of ixekizumab including but not limited to immunosuppression, serious infections, worsening of inflammatory bowel disease and drug reactions.  The patient understands that monitoring is required including a PPD at baseline and must alert us or the primary physician if symptoms of infection or other concerning signs are noted. Azithromycin Counseling:  I discussed with the patient the risks of azithromycin including but not limited to GI upset, allergic reaction, drug rash, diarrhea, and yeast infections. Simponi Counseling:  I discussed with the patient the risks of golimumab including but not limited to myelosuppression, immunosuppression, autoimmune hepatitis, demyelinating diseases, lymphoma, and serious infections.  The patient understands that monitoring is required including a PPD at baseline and must alert us or the primary physician if symptoms of infection or other concerning signs are noted. Rituxan Counseling:  I discussed with the patient the risks of Rituxan infusions. Side effects can include infusion reactions, severe drug rashes including mucocutaneous reactions, reactivation of latent hepatitis and other infections and rarely progressive multifocal leukoencephalopathy.  All of the patient's questions and concerns were addressed. Acitretin Pregnancy And Lactation Text: This medication is Pregnancy Category X and should not be given to women who are pregnant or may become pregnant in the future. This medication is excreted in breast milk. Skyrizi Counseling: I discussed with the patient the risks of risankizumab-rzaa including but not limited to immunosuppression, and serious infections.  The patient understands that monitoring is required including a PPD at baseline and must alert us or the primary physician if symptoms of infection or other concerning signs are noted. Topical Sulfur Applications Pregnancy And Lactation Text: This medication is considered safe during pregnancy and breast feeding secondary to limited systemic absorption. Ilumya Counseling: I discussed with the patient the risks of tildrakizumab including but not limited to immunosuppression, malignancy, posterior leukoencephalopathy syndrome, and serious infections.  The patient understands that monitoring is required including a PPD at baseline and must alert us or the primary physician if symptoms of infection or other concerning signs are noted. Spironolactone Counseling: Patient advised regarding risks of diarrhea, abdominal pain, hyperkalemia, birth defects (for female patients), liver toxicity and renal toxicity. The patient may need blood work to monitor liver and kidney function and potassium levels while on therapy. The patient verbalized understanding of the proper use and possible adverse effects of spironolactone.  All of the patient's questions and concerns were addressed. Benzoyl Peroxide Counseling: Patient counseled that medicine may cause skin irritation and bleach clothing.  In the event of skin irritation, the patient was advised to reduce the amount of the drug applied or use it less frequently.   The patient verbalized understanding of the proper use and possible adverse effects of benzoyl peroxide.  All of the patient's questions and concerns were addressed. Sarecycline Counseling: Patient advised regarding possible photosensitivity and discoloration of the teeth, skin, lips, tongue and gums.  Patient instructed to avoid sunlight, if possible.  When exposed to sunlight, patients should wear protective clothing, sunglasses, and sunscreen.  The patient was instructed to call the office immediately if the following severe adverse effects occur:  hearing changes, easy bruising/bleeding, severe headache, or vision changes.  The patient verbalized understanding of the proper use and possible adverse effects of sarecycline.  All of the patient's questions and concerns were addressed. Fluconazole Counseling:  Patient counseled regarding adverse effects of fluconazole including but not limited to headache, diarrhea, nausea, upset stomach, liver function test abnormalities, taste disturbance, and stomach pain.  There is a rare possibility of liver failure that can occur when taking fluconazole.  The patient understands that monitoring of LFTs and kidney function test may be required, especially at baseline. The patient verbalized understanding of the proper use and possible adverse effects of fluconazole.  All of the patient's questions and concerns were addressed. Erivedge Counseling- I discussed with the patient the risks of Erivedge including but not limited to nausea, vomiting, diarrhea, constipation, weight loss, changes in the sense of taste, decreased appetite, muscle spasms, and hair loss.  The patient verbalized understanding of the proper use and possible adverse effects of Erivedge.  All of the patient's questions and concerns were addressed. Finasteride Male Counseling: Finasteride Counseling:  I discussed with the patient the risks of use of finasteride including but not limited to decreased libido, decreased ejaculate volume, gynecomastia, and depression. Women should not handle medication.  All of the patient's questions and concerns were addressed. Metronidazole Counseling:  I discussed with the patient the risks of metronidazole including but not limited to seizures, nausea/vomiting, a metallic taste in the mouth, nausea/vomiting and severe allergy. Carac Counseling:  I discussed with the patient the risks of Carac including but not limited to erythema, scaling, itching, weeping, crusting, and pain. Humira Counseling:  I discussed with the patient the risks of adalimumab including but not limited to myelosuppression, immunosuppression, autoimmune hepatitis, demyelinating diseases, lymphoma, and serious infections.  The patient understands that monitoring is required including a PPD at baseline and must alert us or the primary physician if symptoms of infection or other concerning signs are noted. Solaraze Counseling:  I discussed with the patient the risks of Solaraze including but not limited to erythema, scaling, itching, weeping, crusting, and pain. Cimetidine Counseling:  I discussed with the patient the risks of Cimetidine including but not limited to gynecomastia, headache, diarrhea, nausea, drowsiness, arrhythmias, pancreatitis, skin rashes, psychosis, bone marrow suppression and kidney toxicity. Arava Counseling:  Patient counseled regarding adverse effects of Arava including but not limited to nausea, vomiting, abnormalities in liver function tests. Patients may develop mouth sores, rash, diarrhea, and abnormalities in blood counts. The patient understands that monitoring is required including LFTs and blood counts.  There is a rare possibility of scarring of the liver and lung problems that can occur when taking methotrexate. Persistent nausea, loss of appetite, pale stools, dark urine, cough, and shortness of breath should be reported immediately. Patient advised to discontinue Arava treatment and consult with a physician prior to attempting conception. The patient will have to undergo a treatment to eliminate Arava from the body prior to conception. Itraconazole Counseling:  I discussed with the patient the risks of itraconazole including but not limited to liver damage, nausea/vomiting, neuropathy, and severe allergy.  The patient understands that this medication is best absorbed when taken with acidic beverages such as non-diet cola or ginger ale.  The patient understands that monitoring is required including baseline LFTs and repeat LFTs at intervals.  The patient understands that they are to contact us or the primary physician if concerning signs are noted.

## 2024-04-29 ENCOUNTER — TRANSCRIPTION ENCOUNTER (OUTPATIENT)
Age: 64
End: 2024-04-29

## 2024-04-29 ENCOUNTER — RESULT REVIEW (OUTPATIENT)
Age: 64
End: 2024-04-29

## 2024-04-29 ENCOUNTER — INPATIENT (INPATIENT)
Facility: HOSPITAL | Age: 64
LOS: 0 days | Discharge: HOME HEALTH | DRG: 470 | End: 2024-04-30
Attending: ORTHOPAEDIC SURGERY | Admitting: ORTHOPAEDIC SURGERY
Payer: MEDICARE

## 2024-04-29 ENCOUNTER — APPOINTMENT (OUTPATIENT)
Dept: ORTHOPEDIC SURGERY | Facility: HOSPITAL | Age: 64
End: 2024-04-29

## 2024-04-29 VITALS
WEIGHT: 167.99 LBS | TEMPERATURE: 97 F | HEART RATE: 90 BPM | RESPIRATION RATE: 17 BRPM | DIASTOLIC BLOOD PRESSURE: 82 MMHG | HEIGHT: 66 IN | SYSTOLIC BLOOD PRESSURE: 154 MMHG | OXYGEN SATURATION: 100 %

## 2024-04-29 DIAGNOSIS — K63.5 POLYP OF COLON: Chronic | ICD-10-CM

## 2024-04-29 DIAGNOSIS — Z98.890 OTHER SPECIFIED POSTPROCEDURAL STATES: Chronic | ICD-10-CM

## 2024-04-29 DIAGNOSIS — M17.11 UNILATERAL PRIMARY OSTEOARTHRITIS, RIGHT KNEE: ICD-10-CM

## 2024-04-29 DIAGNOSIS — Z96.652 PRESENCE OF LEFT ARTIFICIAL KNEE JOINT: Chronic | ICD-10-CM

## 2024-04-29 DIAGNOSIS — Z96.641 PRESENCE OF RIGHT ARTIFICIAL HIP JOINT: Chronic | ICD-10-CM

## 2024-04-29 LAB — GLUCOSE BLDC GLUCOMTR-MCNC: 94 MG/DL — SIGNIFICANT CHANGE UP (ref 70–99)

## 2024-04-29 PROCEDURE — 36415 COLL VENOUS BLD VENIPUNCTURE: CPT

## 2024-04-29 PROCEDURE — 82962 GLUCOSE BLOOD TEST: CPT

## 2024-04-29 PROCEDURE — 97116 GAIT TRAINING THERAPY: CPT | Mod: GP

## 2024-04-29 PROCEDURE — 88305 TISSUE EXAM BY PATHOLOGIST: CPT

## 2024-04-29 PROCEDURE — 73560 X-RAY EXAM OF KNEE 1 OR 2: CPT | Mod: 26,RT

## 2024-04-29 PROCEDURE — 88311 DECALCIFY TISSUE: CPT

## 2024-04-29 PROCEDURE — 97110 THERAPEUTIC EXERCISES: CPT | Mod: GP

## 2024-04-29 PROCEDURE — 85027 COMPLETE CBC AUTOMATED: CPT

## 2024-04-29 PROCEDURE — 80048 BASIC METABOLIC PNL TOTAL CA: CPT

## 2024-04-29 PROCEDURE — 97165 OT EVAL LOW COMPLEX 30 MIN: CPT | Mod: GO

## 2024-04-29 PROCEDURE — 27447 TOTAL KNEE ARTHROPLASTY: CPT | Mod: RT

## 2024-04-29 PROCEDURE — 88305 TISSUE EXAM BY PATHOLOGIST: CPT | Mod: 26

## 2024-04-29 PROCEDURE — 88311 DECALCIFY TISSUE: CPT | Mod: 26

## 2024-04-29 RX ORDER — ONDANSETRON 8 MG/1
4 TABLET, FILM COATED ORAL EVERY 6 HOURS
Refills: 0 | Status: DISCONTINUED | OUTPATIENT
Start: 2024-04-29 | End: 2024-04-30

## 2024-04-29 RX ORDER — TRAMADOL HYDROCHLORIDE 50 MG/1
50 TABLET ORAL EVERY 4 HOURS
Refills: 0 | Status: DISCONTINUED | OUTPATIENT
Start: 2024-04-29 | End: 2024-04-30

## 2024-04-29 RX ORDER — INDOMETHACIN 50 MG
1 CAPSULE ORAL
Qty: 0 | Refills: 0 | DISCHARGE

## 2024-04-29 RX ORDER — CEFAZOLIN SODIUM 1 G
2000 VIAL (EA) INJECTION EVERY 8 HOURS
Refills: 0 | Status: COMPLETED | OUTPATIENT
Start: 2024-04-29 | End: 2024-04-30

## 2024-04-29 RX ORDER — POLYETHYLENE GLYCOL 3350 17 G/17G
17 POWDER, FOR SOLUTION ORAL AT BEDTIME
Refills: 0 | Status: DISCONTINUED | OUTPATIENT
Start: 2024-04-29 | End: 2024-04-30

## 2024-04-29 RX ORDER — SENNA PLUS 8.6 MG/1
2 TABLET ORAL AT BEDTIME
Refills: 0 | Status: DISCONTINUED | OUTPATIENT
Start: 2024-04-29 | End: 2024-04-30

## 2024-04-29 RX ORDER — ACETAMINOPHEN 500 MG
650 TABLET ORAL EVERY 6 HOURS
Refills: 0 | Status: DISCONTINUED | OUTPATIENT
Start: 2024-04-29 | End: 2024-04-30

## 2024-04-29 RX ORDER — KETOROLAC TROMETHAMINE 30 MG/ML
15 SYRINGE (ML) INJECTION EVERY 6 HOURS
Refills: 0 | Status: COMPLETED | OUTPATIENT
Start: 2024-04-29 | End: 2024-04-30

## 2024-04-29 RX ORDER — DEXAMETHASONE 0.5 MG/5ML
2 ELIXIR ORAL DAILY
Refills: 0 | Status: COMPLETED | OUTPATIENT
Start: 2024-04-30 | End: 2024-04-30

## 2024-04-29 RX ORDER — PANTOPRAZOLE SODIUM 20 MG/1
40 TABLET, DELAYED RELEASE ORAL
Refills: 0 | Status: DISCONTINUED | OUTPATIENT
Start: 2024-04-29 | End: 2024-04-30

## 2024-04-29 RX ORDER — HYDROMORPHONE HYDROCHLORIDE 2 MG/ML
1 INJECTION INTRAMUSCULAR; INTRAVENOUS; SUBCUTANEOUS
Refills: 0 | Status: DISCONTINUED | OUTPATIENT
Start: 2024-04-29 | End: 2024-04-29

## 2024-04-29 RX ORDER — OXYCODONE HYDROCHLORIDE 5 MG/1
5 TABLET ORAL EVERY 8 HOURS
Refills: 0 | Status: DISCONTINUED | OUTPATIENT
Start: 2024-04-29 | End: 2024-04-30

## 2024-04-29 RX ORDER — SODIUM CHLORIDE 9 MG/ML
1000 INJECTION INTRAMUSCULAR; INTRAVENOUS; SUBCUTANEOUS
Refills: 0 | Status: DISCONTINUED | OUTPATIENT
Start: 2024-04-29 | End: 2024-04-30

## 2024-04-29 RX ORDER — DULOXETINE HYDROCHLORIDE 30 MG/1
1 CAPSULE, DELAYED RELEASE ORAL
Qty: 0 | Refills: 0 | DISCHARGE

## 2024-04-29 RX ORDER — SODIUM CHLORIDE 9 MG/ML
1000 INJECTION, SOLUTION INTRAVENOUS
Refills: 0 | Status: DISCONTINUED | OUTPATIENT
Start: 2024-04-29 | End: 2024-04-29

## 2024-04-29 RX ORDER — MAGNESIUM HYDROXIDE 400 MG/1
30 TABLET, CHEWABLE ORAL DAILY
Refills: 0 | Status: DISCONTINUED | OUTPATIENT
Start: 2024-04-29 | End: 2024-04-30

## 2024-04-29 RX ORDER — CYCLOBENZAPRINE HYDROCHLORIDE 10 MG/1
1 TABLET, FILM COATED ORAL
Qty: 0 | Refills: 0 | DISCHARGE

## 2024-04-29 RX ORDER — MULTIVIT WITH MIN/MFOLATE/K2 340-15/3 G
1 POWDER (GRAM) ORAL
Refills: 0 | DISCHARGE

## 2024-04-29 RX ORDER — ONDANSETRON 8 MG/1
4 TABLET, FILM COATED ORAL ONCE
Refills: 0 | Status: DISCONTINUED | OUTPATIENT
Start: 2024-04-29 | End: 2024-04-29

## 2024-04-29 RX ORDER — CHLORHEXIDINE GLUCONATE 213 G/1000ML
1 SOLUTION TOPICAL
Refills: 0 | Status: DISCONTINUED | OUTPATIENT
Start: 2024-04-29 | End: 2024-04-30

## 2024-04-29 RX ORDER — HYDROMORPHONE HYDROCHLORIDE 2 MG/ML
0.5 INJECTION INTRAMUSCULAR; INTRAVENOUS; SUBCUTANEOUS
Refills: 0 | Status: DISCONTINUED | OUTPATIENT
Start: 2024-04-29 | End: 2024-04-29

## 2024-04-29 RX ORDER — ASPIRIN/CALCIUM CARB/MAGNESIUM 324 MG
81 TABLET ORAL
Refills: 0 | Status: DISCONTINUED | OUTPATIENT
Start: 2024-04-29 | End: 2024-04-30

## 2024-04-29 RX ORDER — DULOXETINE HYDROCHLORIDE 30 MG/1
60 CAPSULE, DELAYED RELEASE ORAL DAILY
Refills: 0 | Status: DISCONTINUED | OUTPATIENT
Start: 2024-04-29 | End: 2024-04-30

## 2024-04-29 RX ADMIN — POLYETHYLENE GLYCOL 3350 17 GRAM(S): 17 POWDER, FOR SOLUTION ORAL at 21:28

## 2024-04-29 RX ADMIN — TRAMADOL HYDROCHLORIDE 50 MILLIGRAM(S): 50 TABLET ORAL at 23:55

## 2024-04-29 RX ADMIN — DULOXETINE HYDROCHLORIDE 60 MILLIGRAM(S): 30 CAPSULE, DELAYED RELEASE ORAL at 22:08

## 2024-04-29 RX ADMIN — Medication 1000 MILLIGRAM(S): at 13:31

## 2024-04-29 RX ADMIN — Medication 650 MILLIGRAM(S): at 23:35

## 2024-04-29 RX ADMIN — Medication 100 MILLIGRAM(S): at 21:45

## 2024-04-29 RX ADMIN — TRAMADOL HYDROCHLORIDE 50 MILLIGRAM(S): 50 TABLET ORAL at 21:27

## 2024-04-29 RX ADMIN — Medication 1000 MILLIGRAM(S): at 13:01

## 2024-04-29 RX ADMIN — Medication 15 MILLIGRAM(S): at 23:35

## 2024-04-29 RX ADMIN — SENNA PLUS 2 TABLET(S): 8.6 TABLET ORAL at 21:27

## 2024-04-29 NOTE — ASU PATIENT PROFILE, ADULT - NSICDXPASTMEDICALHX_GEN_ALL_CORE_FT
PAST MEDICAL HISTORY:  Anemia     Anxiety     Depression no suicide ideation    Fibromyalgia     History of chemotherapy for RA [id number]    History of food poisoning deliberate    OA (osteoarthritis)     PNA (pneumonia)     PTSD (post-traumatic stress disorder)     Rheumatoid arthritis     Sciatica ?

## 2024-04-29 NOTE — CHART NOTE - NSCHARTNOTEFT_GEN_A_CORE
PACU ANESTHESIA ADMISSION NOTE      Procedure: Right total knee arthroplasty      Post op diagnosis:  Osteoarthritis of right knee        ____  Intubated  TV:______       Rate: ______      FiO2: ______    __x__  Patent Airway    __x__  Full return of protective reflexes    ____  Full recovery from anesthesia / back to baseline     Vitals:   T:  98.5         R:    18              BP:   132/76               Sat:  96                 P: 84      Mental Status:  _x___ Awake   __x___ Alert   _____ Drowsy   _____ Sedated    Nausea/Vomiting:  __x__ NO  ______Yes,   See Post - Op Orders          Pain Scale (0-10):  __0___    Treatment: ____ None    ____ See Post - Op/PCA Orders    Post - Operative Fluids:   ____ Oral   __x__ See Post - Op Orders    Plan: Discharge:   ____Home       __x___Floor     _____Critical Care    _____  Other:_________________    Comments:

## 2024-04-29 NOTE — DISCHARGE NOTE PROVIDER - NSDCHHASSISTILLNESS_GEN_ALL_CORE
Total Knee Arthroplasty  Calcipotriene Counseling:  I discussed with the patient the risks of calcipotriene including but not limited to erythema, scaling, itching, and irritation.

## 2024-04-29 NOTE — PHYSICAL THERAPY INITIAL EVALUATION ADULT - GENERAL OBSERVATIONS, REHAB EVAL
20:00-20:30. Chart reviewed; confirmed with RN to see the pt for PT. Pt ready for PT; received in bed with no complain of pain and in NAD. +hep lock, +ace bandage R knee. Agreeable for PT evaluation.

## 2024-04-29 NOTE — PATIENT PROFILE ADULT - FALL HARM RISK - HARM RISK INTERVENTIONS

## 2024-04-29 NOTE — DISCHARGE NOTE PROVIDER - CARE PROVIDER_API CALL
Carlton Rick  Orthopaedic Surgery  [address] [facility] [place] [address]  Phone: [phone]  Fax: [phone]  Scheduled Appointment: 05/16/2024 10:30 AM

## 2024-04-29 NOTE — PHYSICAL THERAPY INITIAL EVALUATION ADULT - PERTINENT HX OF CURRENT PROBLEM, REHAB EVAL
Pt is a 64 y/o female with dx of elective R TKR with h/o R knee OA under regional anesthesia seen pod #0.

## 2024-04-29 NOTE — DISCHARGE NOTE PROVIDER - NSDCFUSCHEDAPPT_GEN_ALL_CORE_FT
Carlton Rick  [facility] Physician [facility]  [address]  Scheduled Appointment: 05/16/2024

## 2024-04-29 NOTE — ASU PATIENT PROFILE, ADULT - FALL HARM RISK - HARM RISK INTERVENTIONS

## 2024-04-29 NOTE — PATIENT PROFILE ADULT - HOW PATIENT ADDRESSED, PROFILE
Hpi Title: Evaluation of Skin Lesions How Severe Are Your Spot(S)?: mild Have Your Spot(S) Been Treated In The Past?: has not been treated Negative fer

## 2024-04-29 NOTE — DISCHARGE NOTE PROVIDER - HOSPITAL COURSE
63 year old female who was admitted for an elective Total Knee Arthroplasty. The patient tolerated surgery well with no intra/post operative complications. The patient received intra/post operative antibiotics for infection prophylaxis and will be discharged on Aspirin 81mg twice daily for 30 days to lower the risk of blood clots. The patient worked with Physical Therapy while admitted to the hospital and is stable for discharge.

## 2024-04-29 NOTE — DISCHARGE NOTE PROVIDER - NSDCMRMEDTOKEN_GEN_ALL_CORE_FT
Calcium 600+D 600 mg-200 intl units oral tablet: orally 2 times a day  cyclobenzaprine 30 mg oral capsule, extended release: 1 cap(s) orally once a day (at bedtime)  DULoxetine 60 mg oral delayed release capsule: 1 cap(s) orally once a day (at bedtime)  indomethacin 25 mg oral capsule: 1 cap(s) orally 2 times a day  magnesium citrate 100 mg oral capsule: 1 cap(s) orally once a day  Xeljanz XR 11 mg oral tablet, extended release: 1 tab(s) orally once a day   acetaminophen 325 mg oral tablet: 2 tab(s) orally every 6 hours  aspirin 81 mg oral tablet, chewable: 1 tab(s) orally 2 times a day x 30 days after Total Knee Arthroplasty to lower the risk of DVT  Calcium 600+D 600 mg-200 intl units oral tablet: orally 2 times a day  cyclobenzaprine 30 mg oral capsule, extended release: 1 cap(s) orally once a day (at bedtime)  DULoxetine 60 mg oral delayed release capsule: 1 cap(s) orally once a day (at bedtime)  indomethacin 25 mg oral capsule: 1 cap(s) orally 2 times a day  magnesium citrate 100 mg oral capsule: 1 cap(s) orally once a day  naloxone 4 mg/0.1 mL nasal spray: 1 spray(s) intranasally once as needed for OD take as directed in the event of accidental overdose  oxyCODONE 5 mg oral tablet: 1 tab(s) orally every 8 hours as needed for breakthrough pain s/p Total Knee Arthroplasty MDD: 3  pantoprazole 40 mg oral delayed release tablet: 1 tab(s) orally once a day (before a meal) GI prophylaxis for 30 days after Total Knee Arthroplasty  polyethylene glycol 3350 oral powder for reconstitution: 17 gram(s) orally once a day (at bedtime) as needed for  constipation  senna leaf extract oral tablet: 2 tab(s) orally once a day (at bedtime)  traMADol 50 mg oral tablet: 1 tab(s) orally every 4 hours as needed for Moderate Pain (4 - 6) s/p Total Knee Arthroplasty MDD: 6

## 2024-04-30 ENCOUNTER — TRANSCRIPTION ENCOUNTER (OUTPATIENT)
Age: 64
End: 2024-04-30

## 2024-04-30 VITALS
DIASTOLIC BLOOD PRESSURE: 85 MMHG | HEART RATE: 69 BPM | TEMPERATURE: 98 F | RESPIRATION RATE: 16 BRPM | OXYGEN SATURATION: 100 % | SYSTOLIC BLOOD PRESSURE: 146 MMHG

## 2024-04-30 LAB
ANION GAP SERPL CALC-SCNC: 11 MMOL/L — SIGNIFICANT CHANGE UP (ref 7–14)
BUN SERPL-MCNC: 20 MG/DL — SIGNIFICANT CHANGE UP (ref 10–20)
CALCIUM SERPL-MCNC: 8.8 MG/DL — SIGNIFICANT CHANGE UP (ref 8.4–10.5)
CHLORIDE SERPL-SCNC: 104 MMOL/L — SIGNIFICANT CHANGE UP (ref 98–110)
CO2 SERPL-SCNC: 22 MMOL/L — SIGNIFICANT CHANGE UP (ref 17–32)
CREAT SERPL-MCNC: 1.2 MG/DL — SIGNIFICANT CHANGE UP (ref 0.7–1.5)
EGFR: 52 ML/MIN/1.73M2 — LOW
GLUCOSE SERPL-MCNC: 133 MG/DL — HIGH (ref 70–99)
HCT VFR BLD CALC: 28.1 % — LOW (ref 37–47)
HGB BLD-MCNC: 9.4 G/DL — LOW (ref 12–16)
MCHC RBC-ENTMCNC: 30.3 PG — SIGNIFICANT CHANGE UP (ref 27–31)
MCHC RBC-ENTMCNC: 33.5 G/DL — SIGNIFICANT CHANGE UP (ref 32–37)
MCV RBC AUTO: 90.6 FL — SIGNIFICANT CHANGE UP (ref 81–99)
NRBC # BLD: 0 /100 WBCS — SIGNIFICANT CHANGE UP (ref 0–0)
PLATELET # BLD AUTO: 245 K/UL — SIGNIFICANT CHANGE UP (ref 130–400)
PMV BLD: 9.8 FL — SIGNIFICANT CHANGE UP (ref 7.4–10.4)
POTASSIUM SERPL-MCNC: 4.1 MMOL/L — SIGNIFICANT CHANGE UP (ref 3.5–5)
POTASSIUM SERPL-SCNC: 4.1 MMOL/L — SIGNIFICANT CHANGE UP (ref 3.5–5)
RBC # BLD: 3.1 M/UL — LOW (ref 4.2–5.4)
RBC # FLD: 13.5 % — SIGNIFICANT CHANGE UP (ref 11.5–14.5)
SODIUM SERPL-SCNC: 137 MMOL/L — SIGNIFICANT CHANGE UP (ref 135–146)
WBC # BLD: 7.25 K/UL — SIGNIFICANT CHANGE UP (ref 4.8–10.8)
WBC # FLD AUTO: 7.25 K/UL — SIGNIFICANT CHANGE UP (ref 4.8–10.8)

## 2024-04-30 PROCEDURE — 99222 1ST HOSP IP/OBS MODERATE 55: CPT

## 2024-04-30 RX ORDER — POLYETHYLENE GLYCOL 3350 17 G/17G
17 POWDER, FOR SOLUTION ORAL
Qty: 0 | Refills: 0 | DISCHARGE
Start: 2024-04-30

## 2024-04-30 RX ORDER — PANTOPRAZOLE SODIUM 20 MG/1
1 TABLET, DELAYED RELEASE ORAL
Qty: 30 | Refills: 0
Start: 2024-04-30 | End: 2024-05-29

## 2024-04-30 RX ORDER — TRAMADOL HYDROCHLORIDE 50 MG/1
1 TABLET ORAL
Qty: 42 | Refills: 0
Start: 2024-04-30 | End: 2024-05-06

## 2024-04-30 RX ORDER — SENNA PLUS 8.6 MG/1
2 TABLET ORAL
Qty: 0 | Refills: 0 | DISCHARGE
Start: 2024-04-30

## 2024-04-30 RX ORDER — OXYCODONE HYDROCHLORIDE 5 MG/1
1 TABLET ORAL
Qty: 9 | Refills: 0
Start: 2024-04-30 | End: 2024-05-02

## 2024-04-30 RX ORDER — NALOXONE HYDROCHLORIDE 4 MG/.1ML
1 SPRAY NASAL
Qty: 1 | Refills: 0
Start: 2024-04-30

## 2024-04-30 RX ORDER — ACETAMINOPHEN 500 MG
2 TABLET ORAL
Qty: 0 | Refills: 0 | DISCHARGE
Start: 2024-04-30 | End: 2024-05-13

## 2024-04-30 RX ORDER — TOFACITINIB 11 MG/1
1 TABLET, FILM COATED, EXTENDED RELEASE ORAL
Refills: 0 | DISCHARGE

## 2024-04-30 RX ORDER — ASPIRIN/CALCIUM CARB/MAGNESIUM 324 MG
1 TABLET ORAL
Qty: 60 | Refills: 0
Start: 2024-04-30 | End: 2024-05-29

## 2024-04-30 RX ADMIN — Medication 2 MILLIGRAM(S): at 05:03

## 2024-04-30 RX ADMIN — PANTOPRAZOLE SODIUM 40 MILLIGRAM(S): 20 TABLET, DELAYED RELEASE ORAL at 05:08

## 2024-04-30 RX ADMIN — Medication 650 MILLIGRAM(S): at 11:09

## 2024-04-30 RX ADMIN — TRAMADOL HYDROCHLORIDE 50 MILLIGRAM(S): 50 TABLET ORAL at 04:41

## 2024-04-30 RX ADMIN — Medication 15 MILLIGRAM(S): at 11:09

## 2024-04-30 RX ADMIN — DULOXETINE HYDROCHLORIDE 60 MILLIGRAM(S): 30 CAPSULE, DELAYED RELEASE ORAL at 12:18

## 2024-04-30 RX ADMIN — TRAMADOL HYDROCHLORIDE 50 MILLIGRAM(S): 50 TABLET ORAL at 05:20

## 2024-04-30 RX ADMIN — Medication 15 MILLIGRAM(S): at 05:07

## 2024-04-30 RX ADMIN — TRAMADOL HYDROCHLORIDE 50 MILLIGRAM(S): 50 TABLET ORAL at 11:08

## 2024-04-30 RX ADMIN — Medication 650 MILLIGRAM(S): at 13:27

## 2024-04-30 RX ADMIN — Medication 650 MILLIGRAM(S): at 05:06

## 2024-04-30 RX ADMIN — Medication 15 MILLIGRAM(S): at 12:15

## 2024-04-30 RX ADMIN — Medication 81 MILLIGRAM(S): at 05:06

## 2024-04-30 RX ADMIN — Medication 100 MILLIGRAM(S): at 05:07

## 2024-04-30 RX ADMIN — Medication 650 MILLIGRAM(S): at 08:38

## 2024-04-30 RX ADMIN — TRAMADOL HYDROCHLORIDE 50 MILLIGRAM(S): 50 TABLET ORAL at 12:23

## 2024-04-30 RX ADMIN — Medication 15 MILLIGRAM(S): at 08:31

## 2024-04-30 RX ADMIN — Medication 1 TABLET(S): at 11:09

## 2024-04-30 NOTE — OCCUPATIONAL THERAPY INITIAL EVALUATION ADULT - LIVES WITH, PROFILE
in an apartment building elevator accessible and ramp accessible; (+) bathtub with grab bars (+) commode/alone

## 2024-04-30 NOTE — OCCUPATIONAL THERAPY INITIAL EVALUATION ADULT - PATIENT PROFILE REVIEW, REHAB EVAL
89 [phone] yes

## 2024-04-30 NOTE — CONSULT NOTE ADULT - SUBJECTIVE AND OBJECTIVE BOX
GEOVANNYPILI  63y, Female  Allergy: apple (Angioedema)  [place] (Angioedema)  No Known Drug Allergies      CHIEF COMPLAINT: Total Knee Arthroplasty (30 Apr 2024 08:03)      HPI:    HPI:    FAMILY HISTORY:  FHx: colon cancer  Mother, grandmother    FHx: hypertension  Mother    FHx: diabetes mellitus  Mother    FHx: arthritis  Father      PAST MEDICAL & SURGICAL HISTORY:  Rheumatoid arthritis      OA (osteoarthritis)      Fibromyalgia      Depression  no suicide ideation      Anxiety      PTSD (post-traumatic stress disorder)      Sciatica  ?      Anemia      History of food poisoning  deliberate      PNA (pneumonia)      History of chemotherapy  for RA [id number]      History of surgery  gastric bypass 2003      History of cholecystectomy  2009      S/P left knee arthroscopy  bilateral knee arthroscopy 2015      History of dilatation and curettage  April 2018      Colon polyp  removed 2019      History of ovarian cystectomy  Right side by vertical insion      History of total knee replacement, left  3/31/21      H/O Achilles tendon repair      S/P hip replacement, right          SOCIAL HISTORY  Social History:      Home Medications:  acetaminophen 325 mg oral tablet: 2 tab(s) orally every 6 hours (30 Apr 2024 08:43)  Calcium 600+D 600 mg-200 intl units oral tablet: orally 2 times a day (29 Apr 2024 13:23)  cyclobenzaprine 30 mg oral capsule, extended release: 1 cap(s) orally once a day (at bedtime) (29 Apr 2024 13:23)  DULoxetine 60 mg oral delayed release capsule: 1 cap(s) orally once a day (at bedtime) (29 Apr 2024 13:23)  indomethacin 25 mg oral capsule: 1 cap(s) orally 2 times a day (29 Apr 2024 13:23)  magnesium citrate 100 mg oral capsule: 1 cap(s) orally once a day (29 Apr 2024 13:23)  polyethylene glycol 3350 oral powder for reconstitution: 17 gram(s) orally once a day (at bedtime) as needed for  constipation (30 Apr 2024 08:43)  senna leaf extract oral tablet: 2 tab(s) orally once a day (at bedtime) (30 Apr 2024 08:43)      ROS  General: Denies fevers, chills, nightsweats, weight loss  HEENT: Denies headache, rhinorrhea, sore throat, eye pain  CV: Denies CP, palpitations  PULM: Denies SOB, cough  GI: Denies abdominal pain, diarrhea  : Denies dysuria, hematuria  MSK: Denies arthralgias  SKIN: Denies rash   NEURO: Denies paresthesias, weakness  PSYCH: Denies depression    VITALS:  T(F): 98.3, Max: 98.5 (04-29-24 @ 17:27)  HR: 69  BP: 146/85  RR: 16Vital Signs Last 24 Hrs  T(C): 36.8 (30 Apr 2024 08:19), Max: 36.9 (29 Apr 2024 17:27)  T(F): 98.3 (30 Apr 2024 08:19), Max: 98.5 (29 Apr 2024 17:27)  HR: 69 (30 Apr 2024 08:19) (69 - 90)  BP: 146/85 (30 Apr 2024 08:19) (124/70 - 154/82)  BP(mean): --  RR: 16 (30 Apr 2024 08:19) (14 - 22)  SpO2: 100% (30 Apr 2024 08:19) (96% - 100%)    Parameters below as of 30 Apr 2024 08:19  Patient On (Oxygen Delivery Method): room air        PHYSICAL EXAM:  Gen: NAD, resting in bed  HEENT: Normocephalic, atraumatic  Neck: supple, no lymphadenopathy  CV: Regular rate & regular rhythm  Lungs: CTABL no wheeze  Abdomen: Soft, NTND+ BS present  Ext: Warm, well perfused no CCE right knee dressing intact   Neuro: non focal, awake, CN II-XII intact   Skin: no rash, no erythema  Psych: no SI, HI, Hallucination     TESTS & MEASUREMENTS:                        9.4    7.25  )-----------( 245      ( 30 Apr 2024 08:59 )             28.1                       QRS axis to [] ° and NSR at a rate of [] BPM. There was no atrial enlargement. There was no ventricular hypertrophy. There were no ST-T changes and all intervals were normal.      INFECTIOUS DISEASES TESTING  MRSA PCR Result.: Negative (04-16-24 @ 09:07)  MRSA PCR Result.: Negative (12-05-23 @ 12:50)      RADIOLOGY & ADDITIONAL TESTS:  I have personally reviewed the last Chest xray  CXR      CT      CARDIOLOGY TESTING      MEDICATIONS  (STANDING):  acetaminophen     Tablet .. 650 milliGRAM(s) Oral every 6 hours  aspirin  chewable 81 milliGRAM(s) Oral two times a day  calcium carbonate 1250 mG  + Vitamin D (OsCal 500 + D) 1 Tablet(s) Oral daily  chlorhexidine 2% Cloths 1 Application(s) Topical <User Schedule>  DULoxetine 60 milliGRAM(s) Oral daily  ketorolac   Injectable 15 milliGRAM(s) IV Push every 6 hours  pantoprazole    Tablet 40 milliGRAM(s) Oral before breakfast  polyethylene glycol 3350 17 Gram(s) Oral at bedtime  senna 2 Tablet(s) Oral at bedtime  sodium chloride 0.9%. 1000 milliLiter(s) (75 mL/Hr) IV Continuous <Continuous>    MEDICATIONS  (PRN):  magnesium hydroxide Suspension 30 milliLiter(s) Oral daily PRN Constipation  ondansetron Injectable 4 milliGRAM(s) IV Push every 6 hours PRN Nausea and/or Vomiting  oxyCODONE    IR 5 milliGRAM(s) Oral every 8 hours PRN breakthrough pain  traMADol 50 milliGRAM(s) Oral every 4 hours PRN Moderate Pain (4 - 6)      ANTIBIOTICS:      All available historical data has been reviewed    ASSESSMENT  63y F admitted with Primary osteoarthritis of right knee        PROBLEMS

## 2024-04-30 NOTE — OCCUPATIONAL THERAPY INITIAL EVALUATION ADULT - PERTINENT HX OF CURRENT PROBLEM, REHAB EVAL
pt from home, accompanied by daughter for multiple episodes of outburst behavior. pt aox1-2. daughter gave a dose of Risperidone prior to arrival. pt calm and cooperative at this time. pt denies having any pain. hx of dementia. pt uses walker for ambulation. pt moving all extremities within normal limits. Admitted for Right knee OA; s/p Right TKA, regional anesthesia; POD 1 pt from home, accompanied by daughter for multiple episodes of outburst behavior. pt aox1-2. daughter gave a dose of Risperidone prior to arrival. pt calm and cooperative at this time. pt denies having any pain. hx of dementia. pt uses walker for ambulation. pt moving all extremities within normal limits. multiple areas of old wounds with scabs. skin tear noted to right shin.

## 2024-04-30 NOTE — OCCUPATIONAL THERAPY INITIAL EVALUATION ADULT - GENERAL OBSERVATIONS, REHAB EVAL
10:12-10:58 Chart reviewed, ok to treat by Occupational Therapist as confirmed by RN Kymberly, Pt received semi-Gauthier's in bed (+) aquacel right knee minimal drainage (+) heplocl LUE (+) kirsten sequentials in NAD. Pt in agreement with OT IE.

## 2024-04-30 NOTE — DISCHARGE NOTE NURSING/CASE MANAGEMENT/SOCIAL WORK - PATIENT PORTAL LINK FT
You can access the FollowMyHealth Patient Portal offered by [facility] by registering at the following website: [email or web address]. By joining [facility]’s FollowMyHealth portal, you will also be able to view your health information using other applications (apps) compatible with our system.

## 2024-04-30 NOTE — CONSULT NOTE ADULT - ASSESSMENT
Pre-Op Diagnosis	PRE-OP DIAGNOSIS:  Osteoarthritis of right knee 29-Apr-2024 17:14:43  Vicki Snider  Post-Op Diagnosis	POST-OP DIAGNOSIS:  Osteoarthritis of right knee 29-Apr-2024 17:15:03  Vicki Snider  Procedure	PROCEDURES:  Right total knee arthroplasty 29-Apr-2024 17:14:20  Vicki Snider  Operative Findings	degenerative changes  Specimens	none  Estimated Blood Loss	150 milliLiter(s)  Antibiotic Protocol	Followed protocol  Venous Thromboembolism Prophylaxis Therapy	icd  POD#1  pain control   DVT prophyaxsis asa q12   PT/OT  IS  Bowel Regimen  Ortho follow up discussed with ortho team     # hx of RA - not on DMARDS - outpt rec reviewed   #hx of depression/ptsd/anxiety - cont home meds  < from: 12 Lead ECG (04.16.24 @ 07:30) >  QTC Calculation(Bazett) 421 ms    P Axis 29 degrees    R Axis 14 degrees    T Axis 9 degrees    Diagnosis Line Normal sinus rhythm  Normal ECG    < end of copied text >    qtc acceptable - reviewed myself     recall prn  Pre-Op Diagnosis	PRE-OP DIAGNOSIS:  Osteoarthritis of right knee 29-Apr-2024 17:14:43  Vicki Snider  Post-Op Diagnosis	POST-OP DIAGNOSIS:  Osteoarthritis of right knee 29-Apr-2024 17:15:03  Vicki Snider  Procedure	PROCEDURES:  Right total knee arthroplasty 29-Apr-2024 17:14:20  Vicki Snider  Operative Findings	degenerative changes  Specimens	none  Estimated Blood Loss	150 milliLiter(s)  Antibiotic Protocol	Followed protocol  Venous Thromboembolism Prophylaxis Therapy	icd  POD#1  pain control   DVT prophyaxsis asa q12   PT/OT  IS  Bowel Regimen  Ortho follow up discussed with ortho team     # hx of RA - on xeljans, indomethicin - outpt rec reviewed  - avoid duplicate nsaids   #hx of depression/ptsd/anxiety - cont home meds  < from: 12 Lead ECG (04.16.24 @ 07:30) >  QTC Calculation(Bazett) 421 ms    P Axis 29 degrees    R Axis 14 degrees    T Axis 9 degrees    Diagnosis Line Normal sinus rhythm  Normal ECG    < end of copied text >    qtc acceptable - reviewed myself     recall prn

## 2024-04-30 NOTE — PROGRESS NOTE ADULT - SUBJECTIVE AND OBJECTIVE BOX
63y Female POD #   1   S/P right Total Knee Arthroplasty     Patient seen and examined at bedside . The patient is awake and alert in NAD. No complaints of chest pain, SOB, N/V.  Pain is controlled, the patient has ambulated and is voiding.     PAST MEDICAL & SURGICAL HISTORY:  Rheumatoid arthritis    OA (osteoarthritis)    Fibromyalgia    Depression  no suicide ideation    Anxiety    PTSD (post-traumatic stress disorder)    Sciatica  ?    Anemia    History of food poisoning  deliberate    PNA (pneumonia)    History of chemotherapy  for RA [id number]    History of surgery  gastric bypass 2003    History of cholecystectomy  2009    S/P left knee arthroscopy  bilateral knee arthroscopy 2015    History of dilatation and curettage  April 2018    Colon polyp  removed 2019    History of ovarian cystectomy  Right side by vertical insion    History of total knee replacement, left  3/31/21    H/O Achilles tendon repair    S/P hip replacement, right          MEDICATIONS  (STANDING):  acetaminophen     Tablet .. 650 milliGRAM(s) Oral every 6 hours  aspirin  chewable 81 milliGRAM(s) Oral two times a day  calcium carbonate 1250 mG  + Vitamin D (OsCal 500 + D) 1 Tablet(s) Oral daily  chlorhexidine 2% Cloths 1 Application(s) Topical <User Schedule>  DULoxetine 60 milliGRAM(s) Oral daily  ketorolac   Injectable 15 milliGRAM(s) IV Push every 6 hours  pantoprazole    Tablet 40 milliGRAM(s) Oral before breakfast  polyethylene glycol 3350 17 Gram(s) Oral at bedtime  senna 2 Tablet(s) Oral at bedtime  sodium chloride 0.9%. 1000 milliLiter(s) (75 mL/Hr) IV Continuous <Continuous>    MEDICATIONS  (PRN):  magnesium hydroxide Suspension 30 milliLiter(s) Oral daily PRN Constipation  ondansetron Injectable 4 milliGRAM(s) IV Push every 6 hours PRN Nausea and/or Vomiting  oxyCODONE    IR 5 milliGRAM(s) Oral every 8 hours PRN breakthrough pain  traMADol 50 milliGRAM(s) Oral every 4 hours PRN Moderate Pain (4 - 6)        Vital Signs Last 24 Hrs  T(C): 36.4 (30 Apr 2024 04:00), Max: 36.9 (29 Apr 2024 17:27)  T(F): 97.6 (30 Apr 2024 04:00), Max: 98.5 (29 Apr 2024 17:27)  HR: 76 (30 Apr 2024 04:00) (70 - 90)  BP: 126/74 (30 Apr 2024 04:00) (124/70 - 154/82)  BP(mean): --  RR: 18 (30 Apr 2024 04:00) (14 - 22)  SpO2: 98% (30 Apr 2024 04:00) (96% - 100%)                      PE:  The patient was seen and examined at bedside          A&OX3, NAD          right knee Aquacel  dressing C/D/I          Compartments soft, BLE SCD in place          NVI, SILT           A/P:     # POD #   1    s/p right Total Knee Arthroplasty                 - OOB to Chair   -PT/OT -wbat   -post op abx x 2 doses completed   -Pain control - per pain protocol   -Incentive Spirometry   -DVT Prophylaxis - aspirin bid  -GI ppx- continue Protonix   -f/u am labs   -discharge planning- home with home care

## 2024-04-30 NOTE — OCCUPATIONAL THERAPY INITIAL EVALUATION ADULT - LEVEL OF INDEPENDENCE: TUB, REHAB EVAL
Pt advised to have boyfriend present when performing transfer and to practice with home care therapist prior to attempting independently. Pt verbalized good understanding and agreement./supervision

## 2024-05-02 LAB — SURGICAL PATHOLOGY STUDY: SIGNIFICANT CHANGE UP

## 2024-05-04 ENCOUNTER — EMERGENCY (EMERGENCY)
Facility: HOSPITAL | Age: 64
LOS: 0 days | Discharge: HOME | End: 2024-05-04
Attending: EMERGENCY MEDICINE
Payer: MEDICARE

## 2024-05-04 VITALS
OXYGEN SATURATION: 99 % | DIASTOLIC BLOOD PRESSURE: 76 MMHG | SYSTOLIC BLOOD PRESSURE: 136 MMHG | TEMPERATURE: 98 F | HEART RATE: 87 BPM | HEIGHT: 66 IN | RESPIRATION RATE: 16 BRPM | WEIGHT: 167.99 LBS

## 2024-05-04 DIAGNOSIS — K63.5 POLYP OF COLON: Chronic | ICD-10-CM

## 2024-05-04 DIAGNOSIS — Z98.890 OTHER SPECIFIED POSTPROCEDURAL STATES: Chronic | ICD-10-CM

## 2024-05-04 DIAGNOSIS — M06.9 RHEUMATOID ARTHRITIS, UNSPECIFIED: ICD-10-CM

## 2024-05-04 DIAGNOSIS — M79.89 OTHER SPECIFIED SOFT TISSUE DISORDERS: ICD-10-CM

## 2024-05-04 DIAGNOSIS — Z96.641 PRESENCE OF RIGHT ARTIFICIAL HIP JOINT: Chronic | ICD-10-CM

## 2024-05-04 DIAGNOSIS — Z91.018 ALLERGY TO OTHER FOODS: ICD-10-CM

## 2024-05-04 DIAGNOSIS — Z96.652 PRESENCE OF LEFT ARTIFICIAL KNEE JOINT: Chronic | ICD-10-CM

## 2024-05-04 DIAGNOSIS — M79.7 FIBROMYALGIA: ICD-10-CM

## 2024-05-04 DIAGNOSIS — Z96.651 PRESENCE OF RIGHT ARTIFICIAL KNEE JOINT: ICD-10-CM

## 2024-05-04 PROCEDURE — 93970 EXTREMITY STUDY: CPT | Mod: 26

## 2024-05-04 PROCEDURE — 99284 EMERGENCY DEPT VISIT MOD MDM: CPT | Mod: 25

## 2024-05-04 PROCEDURE — 99284 EMERGENCY DEPT VISIT MOD MDM: CPT

## 2024-05-04 PROCEDURE — 93970 EXTREMITY STUDY: CPT

## 2024-05-04 NOTE — ED ADULT NURSE NOTE - CAS TRG GEN SKIN CONDITION
It was nice speaking with you today. Below are the nutrition recommendations we discussed at your visit.    Please let me know if you have any additional questions.    Nutrition Recommendations    Continue eating multiple smaller meals (smaller meals and snacks) such as 4-5 smaller meals per day that include good fiber sources.    --you can stop tracking the specific amount of daily fiber since you are familiar now with your usual foods and fiber amounts and focus on symptoms journals now.    Continue eating 2 kiwi fruits per day (either as a snack or along with a meal).    Do not overeat at a meal. Stop eating when you feel satisfied versus eating to the point of feeling full/too full.    Here are a few ideas we discussed:    --Can have a lighter morning snacks such as just 2 kiwis OR just the oatmeal and then a snack such as your soy yogurt, berries and ground flax in the afternoon (or your usual oatmeal in the morning). Could have a smaller serving of your soy yogurt, berries and ground flax in the afternoon.    --Could have eggs and your usual oatmeal at breakfast and discontinue the toast.    Keep daily food and beverage journals and track any symptoms you experience. Can use an patricia such as My Symptoms Tracker or WatchDox patricia for tracking.    Continue citrucel and other recommendations from Molly Cortez PA-C.    Continue drinking at least 64 oz or more water per day as you currently do.    Can speak with your primary care doctor or with Molly at your follow up about your interest in referral/meeting with our psychologist in our gastroenterology clinic, [facility].    Can follow up as needed.    If you would like to schedule a follow up appointment, please call [phone].    Thank you,    Marta Dugan, MS, RD, LD     [place]

## 2024-05-04 NOTE — ED PROVIDER NOTE - CLINICAL SUMMARY MEDICAL DECISION MAKING FREE TEXT BOX
63-year-old female, post right TKR 8 days ago, presents with right leg swelling.  No fever or trauma.  Swollen right calf on exam, incision without signs of infection.  Venous duplex negative.  Instructed to elevate and follow-up with private orthopedist.

## 2024-05-04 NOTE — ED PROVIDER NOTE - CARE PROVIDER_API CALL
Carlton Rick  Orthopaedic Surgery  [address] [facility] [place] [address]  Phone: [phone]  Fax: [phone]  Follow Up Time:

## 2024-05-04 NOTE — ED ADULT NURSE NOTE - NSFALLUNIVINTERV_ED_ALL_ED
Bed/Stretcher in lowest position, wheels locked, appropriate side rails in place/Call bell, personal items and telephone in reach/Instruct patient to call for assistance before getting out of bed/chair/stretcher/Non-slip footwear applied when patient is off stretcher/[place] to call system/Physically safe environment - no spills, clutter or unnecessary equipment/Purposeful proactive rounding/Room/bathroom lighting operational, light cord in reach

## 2024-05-04 NOTE — ED ADULT NURSE NOTE - OBJECTIVE STATEMENT
Pt presents to ED for right leg swelling. States she had right knee replaced less than one week ago and noted swelling this morning. On assessment, surgical site dressing intact. Swelling noted to right leg. Pt denies any shortness of breath.

## 2024-05-04 NOTE — ED PROVIDER NOTE - PATIENT PORTAL LINK FT
You can access the FollowMyHealth Patient Portal offered by [facility] by registering at the following website: [email or web address]. By joining [employer]’s FollowMyHealth portal, you will also be able to view your health information using other applications (apps) compatible with our system.

## 2024-05-04 NOTE — ED PROVIDER NOTE - PHYSICAL EXAMINATION
CONSTITUTIONAL: Well-appearing; well-nourished; in no apparent distress.   EYES: PERRL; EOM intact.   CARDIOVASCULAR: Normal S1, S2; no murmurs, rubs, or gallops.   RESPIRATORY: Normal chest excursion with respiration; breath sounds clear and equal bilaterally; no wheezes, rhonchi, or rales.  MS: No significant or more muscle tenderness noted to right knee.  Right knee with limited range of motion secondary to swelling.  2+ right DP pulse.  Right foot neurovascular intact.  Entire right leg is swollen from thigh down to right calf.  SKIN: Right knee wound covered in clean dry dressing.  Minimal erythema noted around right knee which is consistent with recent surgery.   NEURO/PSYCH: A & O x 4; grossly unremarkable.

## 2024-05-04 NOTE — ED PROVIDER NOTE - OBJECTIVE STATEMENT
63 years old female history of rheumatoid arthritis/osteoarthritis and fibromyalgia status post right knee total replacement 4 days ago by Dr. Rick present complaint of right leg swelling.  As per patient, denies fall or injury.  Reports she has been doing physical therapy and walking around.  Noted increasing right leg swelling which is much worse than her left leg during her left knee replacement so she comes to ED for evaluation.  Otherwise denies fever, chills, chest pain, shortness of breath, abdominal pain, vomiting or diarrhea.  For the numbness and weakness of right lower extremity

## 2024-05-04 NOTE — ED PROVIDER NOTE - NSFOLLOWUPINSTRUCTIONS_ED_ALL_ED_FT
Leg Edema    duplex prelim - Neg for DVT     WHAT YOU NEED TO KNOW:    Leg edema is swelling caused by fluid buildup. Your legs may swell if you sit or stand for long periods of time, are pregnant, or are injured. Swelling may also occur if you have heart failure or circulation problems. This means that your heart does not pump blood through your body as it should.    DISCHARGE INSTRUCTIONS:    Self-care:     Elevate your legs: Raise your legs above the level of your heart as often as you can. This will help decrease swelling and pain. Prop your legs on pillows or blankets to keep them elevated comfortably.    Wear pressure stockings: These tight stockings put pressure on your legs to promote blood flow and prevent blood clots. Wear the stockings during the day. Do not wear them while you sleep.    Apply heat: Heat helps decrease pain and swelling. Apply heat on the area for 20 to 30 minutes every 2 hours for as many days as directed.     Stay active: Do not stand or sit for long periods of time. Ask your healthcare provider about the best exercise plan for you.    Eat healthy foods: Healthy foods include fruits, vegetables, whole-grain breads, low-fat dairy products, beans, lean meats, and fish. Ask if you need to be on a special diet. Limit salt. Salt will make your body hold even more fluid.    Follow up with your healthcare provider as directed: Write down your questions so you remember to ask them during your visits.     Contact your healthcare provider if:  You have a fever or feel more tired than usual.  The veins in your legs look larger than usual. They may look full or bulging.  Your legs itch or feel heavy.  You have red or white areas or sores on your legs. The skin may also appear dimpled or have indentations.  You are gaining weight.  You have trouble moving your ankles.  The swelling does not go away, or other parts of your body swell.  You have questions or concerns about your condition or care.    Return to the emergency department if:   You cannot walk.  You feel faint or confused.   Your skin turns blue or gray.  Your leg feels warm, tender, and painful. It may be swollen and red.  You have chest pain or trouble breathing that is worse when you lie down.  You suddenly feel lightheaded and have trouble breathing.  You have new and sudden chest pain. You may have more pain when you take deep breaths or cough. You may also cough up blood.

## 2024-05-04 NOTE — ED PROVIDER NOTE - NSICDXPASTMEDICALHX_GEN_ALL_CORE_FT
PAST MEDICAL HISTORY:  Anemia     Anxiety     Depression no suicide ideation    Fibromyalgia     History of chemotherapy for RA [id number]    History of food poisoning deliberate    OA (osteoarthritis)     PNA (pneumonia)     PTSD (post-traumatic stress disorder)     Rheumatoid arthritis     Sciatica ?

## 2024-05-04 NOTE — ED PROVIDER NOTE - ATTENDING APP SHARED VISIT CONTRIBUTION OF CARE
63-year-old female post right TKR 8 days ago, presents with right leg swelling.  No fever or trauma.  Exam shows healing laceration right knee without signs of infection, swollen right calf, good distal pulses.

## 2024-05-06 DIAGNOSIS — Z79.899 OTHER LONG TERM (CURRENT) DRUG THERAPY: ICD-10-CM

## 2024-05-06 DIAGNOSIS — Z98.84 BARIATRIC SURGERY STATUS: ICD-10-CM

## 2024-05-06 DIAGNOSIS — Z92.21 PERSONAL HISTORY OF ANTINEOPLASTIC CHEMOTHERAPY: ICD-10-CM

## 2024-05-06 DIAGNOSIS — Z87.01 PERSONAL HISTORY OF PNEUMONIA (RECURRENT): ICD-10-CM

## 2024-05-06 DIAGNOSIS — F41.9 ANXIETY DISORDER, UNSPECIFIED: ICD-10-CM

## 2024-05-06 DIAGNOSIS — F32.A DEPRESSION, UNSPECIFIED: ICD-10-CM

## 2024-05-06 DIAGNOSIS — Z96.652 PRESENCE OF LEFT ARTIFICIAL KNEE JOINT: ICD-10-CM

## 2024-05-06 DIAGNOSIS — F43.10 POST-TRAUMATIC STRESS DISORDER, UNSPECIFIED: ICD-10-CM

## 2024-05-06 DIAGNOSIS — M06.9 RHEUMATOID ARTHRITIS, UNSPECIFIED: ICD-10-CM

## 2024-05-06 DIAGNOSIS — M17.11 UNILATERAL PRIMARY OSTEOARTHRITIS, RIGHT KNEE: ICD-10-CM
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
## 2024-05-06 DIAGNOSIS — Z91.018 ALLERGY TO OTHER FOODS: ICD-10-CM
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
## 2024-05-14 ENCOUNTER — RESULT CHARGE (OUTPATIENT)
Age: 64
End: 2024-05-14

## 2024-05-14 ENCOUNTER — APPOINTMENT (OUTPATIENT)
Dept: ORTHOPEDIC SURGERY | Facility: CLINIC | Age: 64
End: 2024-05-14
Payer: MEDICARE

## 2024-05-14 DIAGNOSIS — Z96.651 PRESENCE OF RIGHT ARTIFICIAL KNEE JOINT: ICD-10-CM

## 2024-05-14 PROCEDURE — 99024 POSTOP FOLLOW-UP VISIT: CPT

## 2024-05-14 NOTE — HISTORY OF PRESENT ILLNESS
[de-identified] : s/p right TKA doing well postop course uncomplicated, home PT complete, progressing appropriately, now ready for OP PT.  pain controlled (-)n/v/cp/sob  Right knee exam shows well healed incision NTTP AROM 2-90 negative isadora  Imaging:  AP and Lateral views were obtained of the knees, including the contralateral knee for comparison. X-rays are negative for acute bone or soft tissue trauma. Right knee replacement in good alignment without radiographic evidence of complication.  Plan: continue home exercise activities as tolerated OP PT continue dvt prophylaxis f/u at 6 weeks.

## 2024-06-06 RX ORDER — PANTOPRAZOLE 40 MG/1
40 TABLET, DELAYED RELEASE ORAL
Qty: 90 | Refills: 0 | Status: ACTIVE | COMMUNITY
Start: 2024-06-06 | End: 1900-01-01

## 2024-07-23 ENCOUNTER — RX RENEWAL (OUTPATIENT)
Age: 64
End: 2024-07-23

## 2024-09-17 ENCOUNTER — APPOINTMENT (OUTPATIENT)
Dept: ORTHOPEDIC SURGERY | Facility: CLINIC | Age: 64
End: 2024-09-17
Payer: MEDICARE

## 2024-09-17 ENCOUNTER — RESULT CHARGE (OUTPATIENT)
Age: 64
End: 2024-09-17

## 2024-09-17 DIAGNOSIS — M17.11 UNILATERAL PRIMARY OSTEOARTHRITIS, RIGHT KNEE: ICD-10-CM

## 2024-09-17 PROCEDURE — 99213 OFFICE O/P EST LOW 20 MIN: CPT

## 2024-09-17 PROCEDURE — 73560 X-RAY EXAM OF KNEE 1 OR 2: CPT | Mod: RT

## 2024-09-17 NOTE — HISTORY OF PRESENT ILLNESS
[de-identified] : Patient is a 63-year-old female here for evaluation of right knee, right total knee replacement April 2024.  Patient is doing well at this point but complains of slight stiffness and discomfort that comes and goes in the right knee.  Denies recent injury/trauma, numbness or tingling, instability.  Right knee exam: No effusion no ecchymosis no erythema, incision site intact with no sign infection, no tenderness palpation, range of motion 0 degrees to 110 degrees, negative Homans  X-ray of bilateral knees for comparison: No acute fractures, subluxations, dislocations.  Right knee replacement in good alignment  Discussed with patient detail that she is doing well, advised activities as tolerated, range of motion exercises.  Patient follow-up in a few months for reevaluation if still having pain.  Patient understands agrees with plan

## 2024-10-18 ENCOUNTER — RX RENEWAL (OUTPATIENT)
Age: 64
End: 2024-10-18

## 2024-11-08 LAB
CRP SERPL-MCNC: <3 MG/L
ERYTHROCYTE [SEDIMENTATION RATE] IN BLOOD BY WESTERGREN METHOD: 53 MM/HR

## 2024-11-12 ENCOUNTER — APPOINTMENT (OUTPATIENT)
Dept: ORTHOPEDIC SURGERY | Facility: CLINIC | Age: 64
End: 2024-11-12

## 2024-11-12 DIAGNOSIS — Z96.651 PRESENCE OF RIGHT ARTIFICIAL KNEE JOINT: ICD-10-CM

## 2024-11-12 PROCEDURE — 99214 OFFICE O/P EST MOD 30 MIN: CPT | Mod: 25

## 2024-11-12 PROCEDURE — 20610 DRAIN/INJ JOINT/BURSA W/O US: CPT | Mod: RT

## 2024-11-21 ENCOUNTER — NON-APPOINTMENT (OUTPATIENT)
Age: 64
End: 2024-11-21

## 2024-11-22 ENCOUNTER — EMERGENCY (EMERGENCY)
Facility: HOSPITAL | Age: 64
LOS: 0 days | Discharge: HOME | End: 2024-11-22
Attending: STUDENT IN AN ORGANIZED HEALTH CARE EDUCATION/TRAINING PROGRAM
Payer: MEDICARE

## 2024-11-22 VITALS — HEART RATE: 82 BPM | SYSTOLIC BLOOD PRESSURE: 130 MMHG | DIASTOLIC BLOOD PRESSURE: 75 MMHG

## 2024-11-22 VITALS
WEIGHT: 162.92 LBS | HEART RATE: 89 BPM | RESPIRATION RATE: 20 BRPM | OXYGEN SATURATION: 98 % | HEIGHT: 66 IN | TEMPERATURE: 98 F | DIASTOLIC BLOOD PRESSURE: 103 MMHG | SYSTOLIC BLOOD PRESSURE: 165 MMHG

## 2024-11-22 DIAGNOSIS — Z96.642 PRESENCE OF LEFT ARTIFICIAL HIP JOINT: ICD-10-CM

## 2024-11-22 DIAGNOSIS — Z98.890 OTHER SPECIFIED POSTPROCEDURAL STATES: Chronic | ICD-10-CM

## 2024-11-22 DIAGNOSIS — Z96.652 PRESENCE OF LEFT ARTIFICIAL KNEE JOINT: Chronic | ICD-10-CM

## 2024-11-22 DIAGNOSIS — K63.5 POLYP OF COLON: Chronic | ICD-10-CM

## 2024-11-22 DIAGNOSIS — Z91.018 ALLERGY TO OTHER FOODS: ICD-10-CM

## 2024-11-22 DIAGNOSIS — M19.90 UNSPECIFIED OSTEOARTHRITIS, UNSPECIFIED SITE: ICD-10-CM

## 2024-11-22 DIAGNOSIS — F41.9 ANXIETY DISORDER, UNSPECIFIED: ICD-10-CM

## 2024-11-22 DIAGNOSIS — Z96.651 PRESENCE OF RIGHT ARTIFICIAL KNEE JOINT: ICD-10-CM

## 2024-11-22 DIAGNOSIS — F32.A DEPRESSION, UNSPECIFIED: ICD-10-CM

## 2024-11-22 DIAGNOSIS — Z96.641 PRESENCE OF RIGHT ARTIFICIAL HIP JOINT: Chronic | ICD-10-CM

## 2024-11-22 DIAGNOSIS — M79.7 FIBROMYALGIA: ICD-10-CM

## 2024-11-22 DIAGNOSIS — M25.561 PAIN IN RIGHT KNEE: ICD-10-CM

## 2024-11-22 DIAGNOSIS — Z85.43 PERSONAL HISTORY OF MALIGNANT NEOPLASM OF OVARY: ICD-10-CM

## 2024-11-22 DIAGNOSIS — D64.9 ANEMIA, UNSPECIFIED: ICD-10-CM

## 2024-11-22 LAB
ALBUMIN SERPL ELPH-MCNC: 4.1 G/DL — SIGNIFICANT CHANGE UP (ref 3.5–5.2)
ALP SERPL-CCNC: 124 U/L — HIGH (ref 30–115)
ALT FLD-CCNC: 16 U/L — SIGNIFICANT CHANGE UP (ref 0–41)
ANION GAP SERPL CALC-SCNC: 10 MMOL/L — SIGNIFICANT CHANGE UP (ref 7–14)
APTT BLD: 40.6 SEC — HIGH (ref 27–39.2)
AST SERPL-CCNC: 28 U/L — SIGNIFICANT CHANGE UP (ref 0–41)
BASOPHILS # BLD AUTO: 0 K/UL — SIGNIFICANT CHANGE UP (ref 0–0.2)
BASOPHILS NFR BLD AUTO: 0 % — SIGNIFICANT CHANGE UP (ref 0–1)
BILIRUB SERPL-MCNC: 0.3 MG/DL — SIGNIFICANT CHANGE UP (ref 0.2–1.2)
BUN SERPL-MCNC: 18 MG/DL — SIGNIFICANT CHANGE UP (ref 10–20)
CALCIUM SERPL-MCNC: 9.5 MG/DL — SIGNIFICANT CHANGE UP (ref 8.4–10.5)
CHLORIDE SERPL-SCNC: 104 MMOL/L — SIGNIFICANT CHANGE UP (ref 98–110)
CO2 SERPL-SCNC: 26 MMOL/L — SIGNIFICANT CHANGE UP (ref 17–32)
CREAT SERPL-MCNC: 1.3 MG/DL — SIGNIFICANT CHANGE UP (ref 0.7–1.5)
EGFR: 46 ML/MIN/1.73M2 — LOW
EOSINOPHIL # BLD AUTO: 0.08 K/UL — SIGNIFICANT CHANGE UP (ref 0–0.7)
EOSINOPHIL NFR BLD AUTO: 3 % — SIGNIFICANT CHANGE UP (ref 0–8)
GLUCOSE SERPL-MCNC: 90 MG/DL — SIGNIFICANT CHANGE UP (ref 70–99)
HCT VFR BLD CALC: 29.1 % — LOW (ref 37–47)
HGB BLD-MCNC: 9 G/DL — LOW (ref 12–16)
INR BLD: 0.93 RATIO — SIGNIFICANT CHANGE UP (ref 0.65–1.3)
LYMPHOCYTES # BLD AUTO: 0.68 K/UL — LOW (ref 1.2–3.4)
LYMPHOCYTES # BLD AUTO: 25 % — SIGNIFICANT CHANGE UP (ref 20.5–51.1)
MCHC RBC-ENTMCNC: 26.5 PG — LOW (ref 27–31)
MCHC RBC-ENTMCNC: 30.9 G/DL — LOW (ref 32–37)
MCV RBC AUTO: 85.8 FL — SIGNIFICANT CHANGE UP (ref 81–99)
MONOCYTES # BLD AUTO: 0.43 K/UL — SIGNIFICANT CHANGE UP (ref 0.1–0.6)
MONOCYTES NFR BLD AUTO: 16 % — HIGH (ref 1.7–9.3)
NEUTROPHILS # BLD AUTO: 1.49 K/UL — SIGNIFICANT CHANGE UP (ref 1.4–6.5)
NEUTROPHILS NFR BLD AUTO: 55 % — SIGNIFICANT CHANGE UP (ref 42.2–75.2)
NRBC # BLD: SIGNIFICANT CHANGE UP /100 WBCS (ref 0–0)
PLATELET # BLD AUTO: 317 K/UL — SIGNIFICANT CHANGE UP (ref 130–400)
PMV BLD: 9 FL — SIGNIFICANT CHANGE UP (ref 7.4–10.4)
POTASSIUM SERPL-MCNC: 4.7 MMOL/L — SIGNIFICANT CHANGE UP (ref 3.5–5)
POTASSIUM SERPL-SCNC: 4.7 MMOL/L — SIGNIFICANT CHANGE UP (ref 3.5–5)
PROT SERPL-MCNC: 6.9 G/DL — SIGNIFICANT CHANGE UP (ref 6–8)
PROTHROM AB SERPL-ACNC: 11 SEC — SIGNIFICANT CHANGE UP (ref 9.95–12.87)
RBC # BLD: 3.39 M/UL — LOW (ref 4.2–5.4)
RBC # FLD: 14.9 % — HIGH (ref 11.5–14.5)
SODIUM SERPL-SCNC: 140 MMOL/L — SIGNIFICANT CHANGE UP (ref 135–146)
TROPONIN T, HIGH SENSITIVITY RESULT: 11 NG/L — SIGNIFICANT CHANGE UP (ref 6–13)
TROPONIN T, HIGH SENSITIVITY RESULT: <6 NG/L — SIGNIFICANT CHANGE UP (ref 6–13)
WBC # BLD: 2.71 K/UL — LOW (ref 4.8–10.8)
WBC # FLD AUTO: 2.71 K/UL — LOW (ref 4.8–10.8)

## 2024-11-22 PROCEDURE — 86900 BLOOD TYPING SEROLOGIC ABO: CPT

## 2024-11-22 PROCEDURE — 36415 COLL VENOUS BLD VENIPUNCTURE: CPT

## 2024-11-22 PROCEDURE — 93010 ELECTROCARDIOGRAM REPORT: CPT

## 2024-11-22 PROCEDURE — 85730 THROMBOPLASTIN TIME PARTIAL: CPT

## 2024-11-22 PROCEDURE — 80053 COMPREHEN METABOLIC PANEL: CPT

## 2024-11-22 PROCEDURE — 99285 EMERGENCY DEPT VISIT HI MDM: CPT

## 2024-11-22 PROCEDURE — 71046 X-RAY EXAM CHEST 2 VIEWS: CPT

## 2024-11-22 PROCEDURE — 99285 EMERGENCY DEPT VISIT HI MDM: CPT | Mod: 25

## 2024-11-22 PROCEDURE — 86901 BLOOD TYPING SEROLOGIC RH(D): CPT

## 2024-11-22 PROCEDURE — 73562 X-RAY EXAM OF KNEE 3: CPT | Mod: 26,RT

## 2024-11-22 PROCEDURE — 93005 ELECTROCARDIOGRAM TRACING: CPT

## 2024-11-22 PROCEDURE — 85025 COMPLETE CBC W/AUTO DIFF WBC: CPT

## 2024-11-22 PROCEDURE — 85610 PROTHROMBIN TIME: CPT

## 2024-11-22 PROCEDURE — 99283 EMERGENCY DEPT VISIT LOW MDM: CPT

## 2024-11-22 PROCEDURE — 73562 X-RAY EXAM OF KNEE 3: CPT | Mod: RT

## 2024-11-22 PROCEDURE — 84484 ASSAY OF TROPONIN QUANT: CPT

## 2024-11-22 PROCEDURE — 71046 X-RAY EXAM CHEST 2 VIEWS: CPT | Mod: 26

## 2024-11-22 PROCEDURE — 86850 RBC ANTIBODY SCREEN: CPT

## 2024-11-22 RX ORDER — LIDOCAINE HYDROCHLORIDE 40 MG/ML
1 SOLUTION TOPICAL ONCE
Refills: 0 | Status: DISCONTINUED | OUTPATIENT
Start: 2024-11-22 | End: 2024-11-22

## 2024-11-22 NOTE — ED ADULT TRIAGE NOTE - TEMPERATURE IN CELSIUS (DEGREES C)
Patient 58F with PMHx Sjogren's syndrome, Raynaud's, primary biliary sclerosis, anemia, neuropathy presented with confusion and fatigue. Patient reports symptoms started few days ago. She initially was send to [facility] ED by her Rheumatologist because she "doesn't look so good". Patient was evaluated and discharged from ED. Yesterday patient came to ED with similar complaints and found to have grossly normal labs, received IVF and discharged home. Patient was brought in by her friend today because she is more disoriented and worsening fatigue. Patient also endorsed right sided abd pain in ED today. She endorsed abdominal pain for the past few days. no dysuria, or hematuria. endorses chills and subjective fever at home.   In ED, vss, afebrile. labs w/o leukocytosis or left shift. UA+ wbc, LE. CT A/P w/o acute pathology. started on ceftriaxone. 36.7

## 2024-11-22 NOTE — ED PROVIDER NOTE - PHYSICAL EXAMINATION
Physical Exam    Vital Signs: I have reviewed the initial vital signs.  Constitutional: appears stated age, no acute distress  Eyes: Conjunctiva pink, Sclera clear   Cardiovascular: well-perfused extremities  Respiratory: unlabored respiratory effort  Musculoskeletal: Full active ROM of UE and LE. Visualized well healed surgical scar noted to the medial right knee. Tenderness to R lateral knee.   Skin: Warm, dry  Neurologic: awake, alert Physical Exam    Vital Signs: I have reviewed the initial vital signs.  Constitutional: appears stated age, no acute distress  Eyes: Conjunctiva pink, Sclera clear   Cardiovascular: well-perfused extremities  Respiratory: unlabored respiratory effort  Musculoskeletal: Full active ROM of UE and LE. Visualized well healed surgical scar noted to the medial right knee. Tenderness to R lateral knee. No evidence of effusion. No erythema, warmth, or swelling noted.   Skin: Warm, dry  Neurologic: awake, alert

## 2024-11-22 NOTE — ED PROVIDER NOTE - NSICDXPASTSURGICALHX_GEN_ALL_CORE_FT
PAST SURGICAL HISTORY:  Colon polyp removed 2019    H/O Achilles tendon repair     History of cholecystectomy 2009    History of dilatation and curettage April 2018    History of ovarian cystectomy Right side by vertical insion    History of surgery gastric bypass 2003    History of total knee replacement, left 3/31/21    S/P hip replacement, right     S/P left knee arthroscopy bilateral knee arthroscopy 2015     PAST SURGICAL HISTORY:  Colon polyp removed 2019    H/O Achilles tendon repair     History of cholecystectomy 2009    History of dilatation and curettage April 2018    History of ovarian cystectomy Right side by vertical insion    History of surgery gastric bypass 2003    History of total knee replacement, left 3/31/21    History of total left knee replacement R 4/29/24    S/P hip replacement, right     S/P left knee arthroscopy bilateral knee arthroscopy 2015

## 2024-11-22 NOTE — ED PROVIDER NOTE - CONSIDERATION OF ADMISSION OBSERVATION
Consideration of Admission/Observation Considered observation vs admission however pt is a good candidate for d/c home with outpatient f/u given that no life threatening pathology found in ED and pt has close outpatient f/u.

## 2024-11-22 NOTE — ED PROVIDER NOTE - DIFFERENTIAL DIAGNOSIS
Differential Diagnosis differential dx includes but is not limited to:  septic joint, anemia, arrhythmia, electrolyte derangement, ACS

## 2024-11-22 NOTE — ED PROVIDER NOTE - CARE PLAN
Principal Discharge DX:	Right knee pain  Secondary Diagnosis:	Chronic anemia  Secondary Diagnosis:	Chronic leukopenia   1

## 2024-11-22 NOTE — ED ADULT TRIAGE NOTE - CHIEF COMPLAINT QUOTE
As per patient "I had my (right) knee done April 29 2024  by Jamie Rolle, there was a manufacture recall.  Last week Jamie Rolle took fluid out of my knee. My knee has been swollen, warm, and I've been having fevers, ". Patient has rheumatologist Terry, history of RA, fibromyalgia, OA  on Xeljanz

## 2024-11-22 NOTE — ED ADULT NURSE NOTE - NSICDXPASTMEDICALHX_GEN_ALL_CORE_FT
PAST MEDICAL HISTORY:  Anemia     Anxiety     Depression no suicide ideation    Fibromyalgia     History of chemotherapy for RA [id number]    History of food poisoning deliberate    OA (osteoarthritis)     PNA (pneumonia)     PTSD (post-traumatic stress disorder)     Rheumatoid arthritis     Sciatica ?

## 2024-11-22 NOTE — ED PROVIDER NOTE - ATTENDING APP SHARED VISIT CONTRIBUTION OF CARE
63 yo F with hx of anemia, anxiety, depression, fibromyalgia, ovarian cancer s/p surgery, RA, OA, PTSD who presents with increased warmth to R knee x6 mo. Pt underwent R knee total arthroplasty on 4/29/24. Says since then pain has slightly improved compared to pre-op OA pain but has persistent warmth to R knee with mild swelling. She saw her rheumatologist in 9/2024 who did labs notable for ESR 30s. Says for past 3 mo has been more fatigued and temps . For past 4 wks has had runny nose and for past 3 wks has been more winded on exertion and more forgetful. She saw her orthopedist who did labs on 11/7 showing ESR 53 and CRP <0.3. Pt was concerned she had an infection in her R knee joint because there was a manufacture recall on a product used during her procedure. She saw ortho again on 11/17 who did synovasure which was negative. Her rheumatologist said she needed IV abx and actual synovial fluid culture so advised to go back to ED. This morning had ESR and CRP redrawn. Pt says pain has slightly worsened in R knee but able to ambulate. No cp, abd pain. Says she also missed her vit B12 shots.    PMD Dr. Fatima   Ortho Dr. Jamie Rolle  Rheum Dr. Stewart    CONSTITUTIONAL: well developed, nontoxic appearing, in no acute distress, speaking in full sentences  SKIN: warm, dry, no rash, cap refill < 2 seconds  HEENT: normocephalic, atraumatic, no conjunctival erythema, moist mucous membranes, patent airway, slightly pale conjunctiva  NECK: supple  CV:  regular rate, regular rhythm, 2+ radial pulses bilaterally  RESP: no wheezes, no rales, no rhonchi, normal work of breathing  ABD: soft, no tenderness, nondistended, no rebound, no guarding  MSK: normal ROM, no cyanosis, minimal edema to R knee, no erythema, no increased warmth to touch, no effusion, normal extension/flexion of knees, normal dorsiflexion/plantarflexion of ankle, 2+ DP pulses, sensation intact to touch, cap refill <2 seconds, full ROM in toes  NEURO: alert, oriented, grossly unremarkable  PSYCH: cooperative, anxious appearing    A&P:  Pt here with nonspecific malaise and low grade temp with concern about septic joint given recent manufacture recall and elevated ESR on outpatient labs. Nontoxic appearing. Vitals reassuring in ED. Low suspicion for septic joint, osteomyelitis, cellulitis, abscess, bursitis given full ROM, no overlying inflammatory skin changes. Suspect elevated ESR from underlying RA and furthermore CRP negative. Plan for labs, ekg, imaging r/o anemia, arrhythmia, electrolyte derangement, ACS. Will c/s ortho for possible joint aspiration given this is non native joint.

## 2024-11-22 NOTE — ED PROVIDER NOTE - OBJECTIVE STATEMENT
64 y.o. female PMH of RA, OA, Fibromyalgia, Anxiety, Depression, PTSD, R total knee replacement in April 2024, presenting to the ED for evaluation of R knee pain and swelling. Pt is unable to give timeframe of onset, stating her symptoms "come and go" spontaneously but are experienced daily. Pt follows with Dr. Jamie Rolle for ortho and Dr. Stewart for rheumatology. She endorses prior labs revealing elevated ESR since September which prompted patient for further workup. She states current warmth and swelling localized to the right knee. Pt also endorses intermittent low grade fever, max 100.1F at home. Reports ability to bear weight with pain, but she endorses most of her pain is tactile when she has tight clothes on. She denies any new trauma to the area. Denies calf or thigh pain, numbness or tingling. 64 y.o. female PMH of RA, OA, Fibromyalgia, Anemia, Anxiety, Depression, PTSD, R total knee replacement in April 2024, presenting to the ED for evaluation of R knee pain and swelling. Pt is unable to give timeframe of onset, stating her symptoms "come and go" spontaneously but are experienced daily. Pt follows with Dr. Jamie Rolle for ortho and Dr. Stewart for rheumatology. She endorses prior labs revealing elevated ESR since September which prompted patient for further workup. She states current warmth and swelling localized to the right knee. Reports ability to bear weight with pain, but she endorses most of her pain is tactile when she has tight clothes on. Pt also endorses intermittent low grade fever, max 100.1F at home. She denies any new trauma to the area. Denies calf or thigh pain, numbness or tingling.

## 2024-11-22 NOTE — CONSULT NOTE ADULT - SUBJECTIVE AND OBJECTIVE BOX
64F presents for evaluation of right knee  know to me from my practice, had a tka in April, has been progressing reasonably well  but notes more pain than she had in the past when we did her left knee.    We worked it up with ESR CRP, the ESR was elevated.  A synovasure test was performed and was negative    this was roughly 2 weeks ago  she still has concerns however, she discussed her blood work with dr luther her rheumatologist and he was   concerned that her ESR and bumped, also she had a subjective fever at home, so he sent her to the ER.  she has been afebrile in ER    on exam now there does not appear to be clinical signs of infection  no pain with prom  no significant swelling or effusion    x-rays show the right knee replacement in good position    impression:  right tka  at this point low clinical suspicion for infection given that we have already performed the aspiration and synavasure  which was negative, and there a no new findings to suggest infection    will continue to follow  will trend her inflammatory markers as outpatient and if abnormalities persist we can repeat aspiration in 4-6 weeks.    she can f/u with me in 2 weeks  should anything get worse she was instructed to call or come sooner.   64F presents for evaluation of right knee  known to me from my practice, had a r tka in April, has been progressing reasonably well.  prior to that had a left tka and a total hip replacement with me.  she notes more pain this time  than she had in the past when we did her left knee.    We worked it up with ESR CRP, the ESR was elevated.  A synovasure test was performed and was negative    this was roughly 2 weeks ago  she still has concerns however, she discussed her blood work with dr luther her rheumatologist and he was   concerned that her ESR had bumped, also she had a subjective fever at home, so he sent her to the ER.  she has been afebrile in ER    on exam now there does not appear to be clinical signs of infection  no pain with prom  no significant swelling or effusion    x-rays show the right knee replacement in good position    impression:  right tka  at this point low clinical suspicion for infection given that we have already performed the aspiration and synavasure  which was negative, and there a no new findings to suggest infection    will continue to follow  will trend her inflammatory markers as outpatient and if abnormalities persist we can repeat aspiration in 4-6 weeks.    she can f/u with me in 2 weeks  should anything get worse she was instructed to call or come sooner.

## 2024-11-22 NOTE — ED PROVIDER NOTE - CLINICAL SUMMARY MEDICAL DECISION MAKING FREE TEXT BOX
Pt here with nonspecific malaise and low grade temp with concern about septic joint given recent manufacture recall and elevated ESR on outpatient labs. Nontoxic appearing. Vitals reassuring in ED. Low suspicion for septic joint, osteomyelitis, cellulitis, abscess, bursitis given full ROM, no overlying inflammatory skin changes. Suspect elevated ESR from underlying RA and furthermore CRP negative. Plan for labs, ekg, imaging r/o anemia, arrhythmia, electrolyte derangement, ACS. Ortho consulted for possible joint aspiration given this is non native joint. Labs notable for chronic leukopenia 2 (baseline ~3) and chronic mild anemia with hgb 9 (previously 9.4 on 4/30/24), trop 11 with repeat <6. Cxr clear. Xray knee with no acute findings. Spoke with ortho who saw pt. Says low clinical suspicion for septic joint, can f/u outpatient in 2 wks, no joint aspiration indicated. Provided reassurance to pt. Considered observation vs admission however pt is a good candidate for d/c home with outpatient f/u given that no life threatening pathology found in ED and pt has close outpatient f/u. Strict ED return precautions given. Pt verbalized understanding and was agreeable with plan.

## 2024-11-22 NOTE — ED PROVIDER NOTE - PATIENT PORTAL LINK FT
You can access the FollowMyHealth Patient Portal offered by [facility] by registering at the following website: [email or web address]. By joining [employer]’s FollowMyHealth portal, you will also be able to view your health information using other applications (apps) compatible with our system.

## 2024-11-22 NOTE — ED PROVIDER NOTE - PROGRESS NOTE DETAILS
TC: Labs notable for chronic leukopenia 2 (baseline ~3) and chronic mild anemia with hgb 9 (previously 9.4 on 4/30/24), trop 11 with repeat pending. Cxr clear. Xray knee with no acute findings. Spoke with ortho who said Dr. Jamie Rolle is in the OR currently but will see pt once he is out. seen by ortho, will f/u with Dr. Jamie Rolle in 2 weeks

## 2024-11-22 NOTE — ED PROVIDER NOTE - NSICDXPASTMEDICALHX_GEN_ALL_CORE_FT
PAST MEDICAL HISTORY:  Anemia     Anxiety     Depression no suicide ideation    Fibromyalgia     History of chemotherapy for RA [id number]    History of food poisoning deliberate    OA (osteoarthritis)     PNA (pneumonia)     PTSD (post-traumatic stress disorder)     Rheumatoid arthritis     Sciatica ?     PAST MEDICAL HISTORY:  Anemia     Anxiety     Depression     Fibromyalgia     History of chemotherapy for RA [id number]    History of food poisoning deliberate    OA (osteoarthritis)     PNA (pneumonia)     PTSD (post-traumatic stress disorder)     Rheumatoid arthritis     Sciatica ?

## 2024-11-22 NOTE — ED PROVIDER NOTE - CARE PROVIDER_API CALL
Carlton Rick  Orthopaedic Surgery  [address] [facility] [place] [address]  Phone: [phone]  Fax: [phone]  Follow Up Time:

## 2024-12-03 ENCOUNTER — APPOINTMENT (OUTPATIENT)
Dept: ORTHOPEDIC SURGERY | Facility: CLINIC | Age: 64
End: 2024-12-03
Payer: MEDICARE

## 2024-12-03 DIAGNOSIS — Z96.651 PRESENCE OF RIGHT ARTIFICIAL KNEE JOINT: ICD-10-CM

## 2024-12-03 PROCEDURE — 99213 OFFICE O/P EST LOW 20 MIN: CPT

## 2024-12-09 ENCOUNTER — OUTPATIENT (OUTPATIENT)
Dept: OUTPATIENT SERVICES | Facility: HOSPITAL | Age: 64
LOS: 1 days | Discharge: HOME | End: 2024-12-09
Payer: MEDICARE

## 2024-12-09 ENCOUNTER — APPOINTMENT (OUTPATIENT)
Dept: ORTHOPEDIC SURGERY | Facility: HOSPITAL | Age: 64
End: 2024-12-09

## 2024-12-09 ENCOUNTER — TRANSCRIPTION ENCOUNTER (OUTPATIENT)
Age: 64
End: 2024-12-09

## 2024-12-09 VITALS
HEART RATE: 62 BPM | WEIGHT: 160.06 LBS | DIASTOLIC BLOOD PRESSURE: 84 MMHG | RESPIRATION RATE: 17 BRPM | SYSTOLIC BLOOD PRESSURE: 128 MMHG | TEMPERATURE: 98 F | OXYGEN SATURATION: 98 % | HEIGHT: 66 IN

## 2024-12-09 DIAGNOSIS — Z96.651 PRESENCE OF RIGHT ARTIFICIAL KNEE JOINT: Chronic | ICD-10-CM

## 2024-12-09 DIAGNOSIS — Z96.641 PRESENCE OF RIGHT ARTIFICIAL HIP JOINT: Chronic | ICD-10-CM

## 2024-12-09 DIAGNOSIS — K63.5 POLYP OF COLON: Chronic | ICD-10-CM

## 2024-12-09 DIAGNOSIS — Z96.652 PRESENCE OF LEFT ARTIFICIAL KNEE JOINT: Chronic | ICD-10-CM

## 2024-12-09 DIAGNOSIS — Z98.890 OTHER SPECIFIED POSTPROCEDURAL STATES: Chronic | ICD-10-CM

## 2024-12-09 DIAGNOSIS — Z96.651 PRESENCE OF RIGHT ARTIFICIAL KNEE JOINT: ICD-10-CM

## 2024-12-09 LAB
B PERT IGG+IGM PNL SER: ABNORMAL
COLOR FLD: SIGNIFICANT CHANGE UP
EOSINOPHIL # FLD: 1 % — SIGNIFICANT CHANGE UP
FLUID INTAKE SUBSTANCE CLASS: SIGNIFICANT CHANGE UP
FOLATE+VIT B12 SERBLD-IMP: 4 % — SIGNIFICANT CHANGE UP
GRAM STN FLD: SIGNIFICANT CHANGE UP
LYMPHOCYTES # FLD: 29 % — SIGNIFICANT CHANGE UP
MESOTHL CELL # FLD: 2 % — SIGNIFICANT CHANGE UP
MONOS+MACROS # FLD: 25 % — SIGNIFICANT CHANGE UP
NEUTROPHILS-BODY FLUID: 39 % — SIGNIFICANT CHANGE UP
PATHOLOGIST REVIEW: SIGNIFICANT CHANGE UP
RCV VOL RI: HIGH /UL (ref 0–0)
SPECIMEN SOURCE FLD: SIGNIFICANT CHANGE UP
TOTAL CELLS COUNTED, BODY FLUID: 100 CELLS — SIGNIFICANT CHANGE UP
TOTAL NUCLEATED CELL COUNT, BODY FLUID: 689 /UL — SIGNIFICANT CHANGE UP
TUBE TYPE: SIGNIFICANT CHANGE UP

## 2024-12-09 PROCEDURE — 87015 SPECIMEN INFECT AGNT CONCNTJ: CPT

## 2024-12-09 PROCEDURE — 87102 FUNGUS ISOLATION CULTURE: CPT

## 2024-12-09 PROCEDURE — 87075 CULTR BACTERIA EXCEPT BLOOD: CPT

## 2024-12-09 PROCEDURE — 87070 CULTURE OTHR SPECIMN AEROBIC: CPT

## 2024-12-09 PROCEDURE — 87206 SMEAR FLUORESCENT/ACID STAI: CPT

## 2024-12-09 PROCEDURE — 20610 DRAIN/INJ JOINT/BURSA W/O US: CPT | Mod: RT

## 2024-12-09 PROCEDURE — 89051 BODY FLUID CELL COUNT: CPT

## 2024-12-09 PROCEDURE — 87116 MYCOBACTERIA CULTURE: CPT

## 2024-12-09 PROCEDURE — 87205 SMEAR GRAM STAIN: CPT

## 2024-12-09 RX ORDER — CHOLECALCIFEROL (VITAMIN D3) 10MCG/0.25
1 DROPS ORAL
Refills: 0 | DISCHARGE

## 2024-12-09 RX ORDER — TOFACITINIB 5 MG/1
1 TABLET, FILM COATED ORAL
Refills: 0 | DISCHARGE

## 2024-12-09 NOTE — ASU PATIENT PROFILE, ADULT - NSICDXPASTSURGICALHX_GEN_ALL_CORE_FT
PAST SURGICAL HISTORY:  Colon polyp removed 2019    H/O Achilles tendon repair     History of cholecystectomy 2009    History of dilatation and curettage April 2018    History of ovarian cystectomy Right side by vertical insion    History of surgery gastric bypass 2003    History of total knee replacement, left 3/31/21    S/P hip replacement, right     S/P left knee arthroscopy bilateral knee arthroscopy 2015    Total knee replacement status, right 4/2024

## 2024-12-09 NOTE — ASU DISCHARGE PLAN (ADULT/PEDIATRIC) - CARE PROVIDER_API CALL
Carlton Rick  Orthopaedic Surgery  [address] [facility] [place] [address]  Phone: [phone]  Fax: [phone]  Follow Up Time:

## 2024-12-09 NOTE — ASU DISCHARGE PLAN (ADULT/PEDIATRIC) - FINANCIAL ASSISTANCE
[facility] provides services at a reduced cost to those who are determined to be eligible through [facility]’s financial assistance program. Information regarding [facility]’s financial assistance program can be found by going to [email or web address] or by calling [phone].

## 2024-12-09 NOTE — ASU PATIENT PROFILE, ADULT - NSICDXPASTMEDICALHX_GEN_ALL_CORE_FT
PAST MEDICAL HISTORY:  Anemia     Anxiety     Depression     Fibromyalgia     History of chemotherapy for RA [id number]    History of food poisoning deliberate    OA (osteoarthritis)     PNA (pneumonia)     PTSD (post-traumatic stress disorder)     Rheumatoid arthritis     Sciatica ?

## 2024-12-09 NOTE — H&P ADULT - HISTORY OF PRESENT ILLNESS
64F with concern for infection of right total knee replacement done in april  labs show elevated ESR  will do aspiration  prior aspiration from office was done with negative alpha defensin synovasure test  today will repeat synovasure and send cell count and cultures as well

## 2024-12-10 LAB
GRAM STN FLD: SIGNIFICANT CHANGE UP
NIGHT BLUE STAIN TISS: SIGNIFICANT CHANGE UP
SPECIMEN SOURCE: SIGNIFICANT CHANGE UP
SPECIMEN SOURCE: SIGNIFICANT CHANGE UP

## 2024-12-11 DIAGNOSIS — Z96.652 PRESENCE OF LEFT ARTIFICIAL KNEE JOINT: ICD-10-CM

## 2024-12-11 DIAGNOSIS — Z91.018 ALLERGY TO OTHER FOODS: ICD-10-CM

## 2024-12-11 DIAGNOSIS — D64.9 ANEMIA, UNSPECIFIED: ICD-10-CM

## 2024-12-11 DIAGNOSIS — F32.A DEPRESSION, UNSPECIFIED: ICD-10-CM

## 2024-12-11 DIAGNOSIS — M79.7 FIBROMYALGIA: ICD-10-CM
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
## 2024-12-11 DIAGNOSIS — Z96.641 PRESENCE OF RIGHT ARTIFICIAL HIP JOINT: ICD-10-CM

## 2024-12-11 DIAGNOSIS — Z79.82 LONG TERM (CURRENT) USE OF ASPIRIN: ICD-10-CM

## 2024-12-11 DIAGNOSIS — Z98.84 BARIATRIC SURGERY STATUS: ICD-10-CM

## 2024-12-11 DIAGNOSIS — Z96.651 PRESENCE OF RIGHT ARTIFICIAL KNEE JOINT: ICD-10-CM

## 2024-12-11 DIAGNOSIS — Z86.0100 PERSONAL HISTORY OF COLON POLYPS, UNSPECIFIED: ICD-10-CM

## 2024-12-17 ENCOUNTER — APPOINTMENT (OUTPATIENT)
Dept: ORTHOPEDIC SURGERY | Facility: CLINIC | Age: 64
End: 2024-12-17

## 2024-12-19 ENCOUNTER — APPOINTMENT (OUTPATIENT)
Facility: CLINIC | Age: 64
End: 2024-12-19

## 2024-12-19 DIAGNOSIS — Z96.651 PRESENCE OF RIGHT ARTIFICIAL KNEE JOINT: ICD-10-CM

## 2024-12-19 PROCEDURE — 99213 OFFICE O/P EST LOW 20 MIN: CPT

## 2024-12-23 LAB
CULTURE RESULTS: SIGNIFICANT CHANGE UP
SPECIMEN SOURCE: SIGNIFICANT CHANGE UP

## 2025-01-07 ENCOUNTER — APPOINTMENT (OUTPATIENT)
Dept: ORTHOPEDIC SURGERY | Facility: CLINIC | Age: 65
End: 2025-01-07

## 2025-02-04 ENCOUNTER — APPOINTMENT (OUTPATIENT)
Dept: ORTHOPEDIC SURGERY | Facility: CLINIC | Age: 65
End: 2025-02-04
Payer: MEDICARE

## 2025-02-04 DIAGNOSIS — Z96.651 PRESENCE OF RIGHT ARTIFICIAL KNEE JOINT: ICD-10-CM

## 2025-02-04 PROCEDURE — 73562 X-RAY EXAM OF KNEE 3: CPT | Mod: RT

## 2025-02-04 PROCEDURE — 99213 OFFICE O/P EST LOW 20 MIN: CPT

## 2025-02-15 RX ORDER — CEPHALEXIN 500 MG/1
500 TABLET ORAL
Qty: 14 | Refills: 0 | Status: ACTIVE | COMMUNITY
Start: 2025-02-15 | End: 1900-01-01

## 2025-02-18 ENCOUNTER — APPOINTMENT (OUTPATIENT)
Dept: ORTHOPEDIC SURGERY | Facility: CLINIC | Age: 65
End: 2025-02-18
Payer: MEDICARE

## 2025-02-18 DIAGNOSIS — Z96.651 PRESENCE OF RIGHT ARTIFICIAL KNEE JOINT: ICD-10-CM

## 2025-02-18 PROCEDURE — 99213 OFFICE O/P EST LOW 20 MIN: CPT

## 2025-03-24 NOTE — ASU PREOP CHECKLIST - AS TEMP SITE
Call received from patients wife. Patient has MRI scheduled tomorrow and got his BUN and creatinine drawn. Asking if his labs are okay for his scan tomorrow.   
oral

## 2025-03-25 ENCOUNTER — APPOINTMENT (OUTPATIENT)
Dept: ORTHOPEDIC SURGERY | Facility: CLINIC | Age: 65
End: 2025-03-25

## 2025-03-29 NOTE — OCCUPATIONAL THERAPY INITIAL EVALUATION ADULT - LEVEL OF INDEPENDENCE: EATING, OT EVAL
Patient alert and oriented x4. VSS and on 1L NC. Port to right chest. Dressing CDI. Pt ambulating to bathroom independent. Complaints of pain in right leg. PRNs given per MAR. Fall and safety precautions in place. Call bell within reach and no needs at this time.    Problem: Adult Inpatient Plan of Care  Goal: Plan of Care Review  Outcome: Progressing  Goal: Patient-Specific Goal (Individualized)  Outcome: Progressing  Goal: Absence of Hospital-Acquired Illness or Injury  Outcome: Progressing  Goal: Optimal Comfort and Wellbeing  Outcome: Progressing  Goal: Readiness for Transition of Care  Outcome: Progressing     Problem: Acute Kidney Injury/Impairment  Goal: Fluid and Electrolyte Balance  Outcome: Progressing  Goal: Improved Oral Intake  Outcome: Progressing  Goal: Effective Renal Function  Outcome: Progressing     Problem: Infection  Goal: Absence of Infection Signs and Symptoms  Outcome: Progressing     Problem: Sickle Cell Disease  Goal: Optimal Cerebral Tissue Perfusion  Outcome: Progressing  Goal: Optimal Coping with Sickle Cell Disease  Outcome: Progressing  Goal: Effective Tissue Perfusion  Outcome: Progressing  Goal: Absence of Infection Signs and Symptoms  Outcome: Progressing  Goal: Optimal Pain Control and Function  Outcome: Progressing  Goal: Optimal Oxygenation  Outcome: Progressing     Problem: Pain Acute  Goal: Optimal Pain Control and Function  Outcome: Progressing      independent

## 2025-05-03 ENCOUNTER — RX RENEWAL (OUTPATIENT)
Age: 65
End: 2025-05-03

## 2025-06-09 ENCOUNTER — NON-APPOINTMENT (OUTPATIENT)
Age: 65
End: 2025-06-09

## 2025-06-10 ENCOUNTER — APPOINTMENT (OUTPATIENT)
Dept: ORTHOPEDIC SURGERY | Facility: CLINIC | Age: 65
End: 2025-06-10
Payer: MEDICARE

## 2025-06-10 PROCEDURE — 99213 OFFICE O/P EST LOW 20 MIN: CPT

## 2025-06-10 PROCEDURE — 73560 X-RAY EXAM OF KNEE 1 OR 2: CPT | Mod: 50

## 2025-06-20 NOTE — CONSULT NOTE ADULT - CONSULT REASON
medical co-managment
GENERAL: Well-nourished, Well-developed. NAD.  HEAD: No visible or palpable bumps or hematomas. No ecchymosis behind ears B/L.  Eyes: PERRLA, EOMI.   ENMT: MMM.   Neck: Supple. FROM  CVS: Normal S1,S2. No murmurs appreciated on auscultation   RESP: No use of accessory muscles. Chest rise symmetrical with good expansion. Lungs clear to auscultation B/L. No wheezing, rales, or rhonchi auscultated.  Skin: Warm, Dry. No rashes or lesions. Good cap refill < 2 sec B/L.  Neuro: AA&O x 3. Sensation grossly intact. Strength 5/5 B/L. Gait within normal limits.

## 2025-08-04 ENCOUNTER — RX RENEWAL (OUTPATIENT)
Age: 65
End: 2025-08-04